# Patient Record
Sex: MALE | Race: WHITE | Employment: OTHER | URBAN - METROPOLITAN AREA
[De-identification: names, ages, dates, MRNs, and addresses within clinical notes are randomized per-mention and may not be internally consistent; named-entity substitution may affect disease eponyms.]

---

## 2017-12-27 PROBLEM — I48.91 A-FIB (HCC): Status: ACTIVE | Noted: 2017-12-27

## 2017-12-27 PROBLEM — I50.9 CHF (CONGESTIVE HEART FAILURE) (HCC): Status: ACTIVE | Noted: 2017-12-27

## 2018-10-04 ENCOUNTER — APPOINTMENT (OUTPATIENT)
Dept: GENERAL RADIOLOGY | Age: 73
DRG: 313 | End: 2018-10-04
Payer: MEDICARE

## 2018-10-04 ENCOUNTER — HOSPITAL ENCOUNTER (INPATIENT)
Age: 73
LOS: 1 days | Discharge: HOME | DRG: 313 | End: 2018-10-05
Attending: EMERGENCY MEDICINE | Admitting: INTERNAL MEDICINE
Payer: MEDICARE

## 2018-10-04 DIAGNOSIS — R07.9 CHEST PAIN, UNSPECIFIED TYPE: Primary | ICD-10-CM

## 2018-10-04 LAB
ANION GAP SERPL CALCULATED.3IONS-SCNC: 13 MMOL/L (ref 7–16)
BASOPHILS ABSOLUTE: 0 E9/L (ref 0–0.2)
BASOPHILS RELATIVE PERCENT: 0.5 % (ref 0–2)
BUN BLDV-MCNC: 18 MG/DL (ref 8–23)
CALCIUM SERPL-MCNC: 9.1 MG/DL (ref 8.6–10.2)
CHLORIDE BLD-SCNC: 99 MMOL/L (ref 98–107)
CO2: 25 MMOL/L (ref 22–29)
CREAT SERPL-MCNC: 1.5 MG/DL (ref 0.7–1.2)
EOSINOPHILS ABSOLUTE: 0.13 E9/L (ref 0.05–0.5)
EOSINOPHILS RELATIVE PERCENT: 1.7 % (ref 0–6)
GFR AFRICAN AMERICAN: 55
GFR NON-AFRICAN AMERICAN: 46 ML/MIN/1.73
GLUCOSE BLD-MCNC: 186 MG/DL (ref 74–109)
HCT VFR BLD CALC: 41.5 % (ref 37–54)
HEMOGLOBIN: 14.4 G/DL (ref 12.5–16.5)
LYMPHOCYTES ABSOLUTE: 2.69 E9/L (ref 1.5–4)
LYMPHOCYTES RELATIVE PERCENT: 33.9 % (ref 20–42)
MCH RBC QN AUTO: 32.8 PG (ref 26–35)
MCHC RBC AUTO-ENTMCNC: 34.7 % (ref 32–34.5)
MCV RBC AUTO: 94.5 FL (ref 80–99.9)
METAMYELOCYTES RELATIVE PERCENT: 1.7 % (ref 0–1)
MONOCYTES ABSOLUTE: 0.71 E9/L (ref 0.1–0.95)
MONOCYTES RELATIVE PERCENT: 8.7 % (ref 2–12)
NEUTROPHILS ABSOLUTE: 4.42 E9/L (ref 1.8–7.3)
NEUTROPHILS RELATIVE PERCENT: 53.9 % (ref 43–80)
OVALOCYTES: ABNORMAL
PDW BLD-RTO: 13.1 FL (ref 11.5–15)
PLATELET # BLD: 77 E9/L (ref 130–450)
PLATELET CONFIRMATION: NORMAL
PMV BLD AUTO: 9.6 FL (ref 7–12)
POIKILOCYTES: ABNORMAL
POLYCHROMASIA: ABNORMAL
POTASSIUM SERPL-SCNC: 4.5 MMOL/L (ref 3.5–5)
RBC # BLD: 4.39 E12/L (ref 3.8–5.8)
SODIUM BLD-SCNC: 137 MMOL/L (ref 132–146)
TEAR DROP CELLS: ABNORMAL
TROPONIN: <0.01 NG/ML (ref 0–0.03)
TROPONIN: <0.01 NG/ML (ref 0–0.03)
WBC # BLD: 7.9 E9/L (ref 4.5–11.5)

## 2018-10-04 PROCEDURE — 84484 ASSAY OF TROPONIN QUANT: CPT

## 2018-10-04 PROCEDURE — 1200000000 HC SEMI PRIVATE

## 2018-10-04 PROCEDURE — 80048 BASIC METABOLIC PNL TOTAL CA: CPT

## 2018-10-04 PROCEDURE — 71045 X-RAY EXAM CHEST 1 VIEW: CPT

## 2018-10-04 PROCEDURE — 85025 COMPLETE CBC W/AUTO DIFF WBC: CPT

## 2018-10-04 PROCEDURE — G0378 HOSPITAL OBSERVATION PER HR: HCPCS

## 2018-10-04 PROCEDURE — 99285 EMERGENCY DEPT VISIT HI MDM: CPT

## 2018-10-04 PROCEDURE — 6370000000 HC RX 637 (ALT 250 FOR IP): Performed by: EMERGENCY MEDICINE

## 2018-10-04 PROCEDURE — 36415 COLL VENOUS BLD VENIPUNCTURE: CPT

## 2018-10-04 RX ORDER — ASPIRIN 81 MG/1
324 TABLET, CHEWABLE ORAL ONCE
Status: COMPLETED | OUTPATIENT
Start: 2018-10-04 | End: 2018-10-04

## 2018-10-04 RX ORDER — ASPIRIN 81 MG/1
81 TABLET, CHEWABLE ORAL DAILY
Status: CANCELLED | OUTPATIENT
Start: 2018-10-05

## 2018-10-04 RX ADMIN — ASPIRIN 81 MG CHEWABLE TABLET 324 MG: 81 TABLET CHEWABLE at 17:28

## 2018-10-04 ASSESSMENT — ENCOUNTER SYMPTOMS
WHEEZING: 0
SINUS PRESSURE: 0
DIARRHEA: 0
SINUS PAIN: 0
NAUSEA: 0
COUGH: 0
SORE THROAT: 0
PHOTOPHOBIA: 0
ABDOMINAL PAIN: 0
VOMITING: 0
CONSTIPATION: 0
BACK PAIN: 0
SHORTNESS OF BREATH: 1
RHINORRHEA: 0

## 2018-10-04 ASSESSMENT — PAIN DESCRIPTION - LOCATION: LOCATION: CHEST

## 2018-10-04 ASSESSMENT — PAIN SCALES - GENERAL: PAINLEVEL_OUTOF10: 5

## 2018-10-04 ASSESSMENT — PAIN DESCRIPTION - PAIN TYPE: TYPE: ACUTE PAIN

## 2018-10-04 NOTE — ED NOTES
Ambulated pt around the nurses station. His pulse ox remained between 96-97%. His pulse was 97 while sitting, dropped to 54 while standing. Pt c/o of feeling dizzy and stood for a moment before walking. His pulse was 74 and he no longer felt dizzy and we walked around the nurses station. His pulse while ambulating was as low as 54 and as high as 116. Pulse ox remained the same. Pt did not complain of SOB or chest pain while ambulating.       Melissa Linton RN  10/04/18 [id number]

## 2018-10-05 ENCOUNTER — APPOINTMENT (OUTPATIENT)
Dept: NUCLEAR MEDICINE | Age: 73
DRG: 313 | End: 2018-10-05
Payer: MEDICARE

## 2018-10-05 VITALS
OXYGEN SATURATION: 99 % | SYSTOLIC BLOOD PRESSURE: 154 MMHG | HEART RATE: 75 BPM | BODY MASS INDEX: 35.35 KG/M2 | WEIGHT: 261 LBS | HEIGHT: 72 IN | TEMPERATURE: 98.3 F | RESPIRATION RATE: 18 BRPM | DIASTOLIC BLOOD PRESSURE: 91 MMHG

## 2018-10-05 LAB
ALBUMIN SERPL-MCNC: 3.9 G/DL (ref 3.5–5.2)
ALP BLD-CCNC: 41 U/L (ref 40–129)
ALT SERPL-CCNC: 24 U/L (ref 0–40)
ANION GAP SERPL CALCULATED.3IONS-SCNC: 13 MMOL/L (ref 7–16)
AST SERPL-CCNC: 35 U/L (ref 0–39)
BILIRUB SERPL-MCNC: 0.6 MG/DL (ref 0–1.2)
BUN BLDV-MCNC: 20 MG/DL (ref 8–23)
CALCIUM SERPL-MCNC: 8.8 MG/DL (ref 8.6–10.2)
CHLORIDE BLD-SCNC: 100 MMOL/L (ref 98–107)
CHOLESTEROL, TOTAL: 114 MG/DL (ref 0–199)
CO2: 25 MMOL/L (ref 22–29)
CREAT SERPL-MCNC: 1.4 MG/DL (ref 0.7–1.2)
GFR AFRICAN AMERICAN: 60
GFR NON-AFRICAN AMERICAN: 49 ML/MIN/1.73
GLUCOSE BLD-MCNC: 195 MG/DL (ref 74–109)
HBA1C MFR BLD: 7.1 % (ref 4–5.6)
HCT VFR BLD CALC: 40.2 % (ref 37–54)
HDLC SERPL-MCNC: 29 MG/DL
HEMOGLOBIN: 14.1 G/DL (ref 12.5–16.5)
LDL CHOLESTEROL CALCULATED: 51 MG/DL (ref 0–99)
LV EF: 58 %
LVEF MODALITY: NORMAL
MAGNESIUM: 1.7 MG/DL (ref 1.6–2.6)
MCH RBC QN AUTO: 33.2 PG (ref 26–35)
MCHC RBC AUTO-ENTMCNC: 35.1 % (ref 32–34.5)
MCV RBC AUTO: 94.6 FL (ref 80–99.9)
METER GLUCOSE: 178 MG/DL (ref 70–110)
METER GLUCOSE: 189 MG/DL (ref 70–110)
METER GLUCOSE: 266 MG/DL (ref 70–110)
PDW BLD-RTO: 13.2 FL (ref 11.5–15)
PHOSPHORUS: 3 MG/DL (ref 2.5–4.5)
PLATELET # BLD: 71 E9/L (ref 130–450)
PLATELET CONFIRMATION: NORMAL
PMV BLD AUTO: 9.9 FL (ref 7–12)
POTASSIUM SERPL-SCNC: 5.6 MMOL/L (ref 3.5–5)
RBC # BLD: 4.25 E12/L (ref 3.8–5.8)
SODIUM BLD-SCNC: 138 MMOL/L (ref 132–146)
TOTAL PROTEIN: 6.7 G/DL (ref 6.4–8.3)
TRIGL SERPL-MCNC: 169 MG/DL (ref 0–149)
TROPONIN: <0.01 NG/ML (ref 0–0.03)
TROPONIN: <0.01 NG/ML (ref 0–0.03)
TSH SERPL DL<=0.05 MIU/L-ACNC: 4.15 UIU/ML (ref 0.27–4.2)
VLDLC SERPL CALC-MCNC: 34 MG/DL
WBC # BLD: 7.2 E9/L (ref 4.5–11.5)

## 2018-10-05 PROCEDURE — 83735 ASSAY OF MAGNESIUM: CPT

## 2018-10-05 PROCEDURE — 3430000000 HC RX DIAGNOSTIC RADIOPHARMACEUTICAL: Performed by: RADIOLOGY

## 2018-10-05 PROCEDURE — 94660 CPAP INITIATION&MGMT: CPT

## 2018-10-05 PROCEDURE — 84443 ASSAY THYROID STIM HORMONE: CPT

## 2018-10-05 PROCEDURE — 90686 IIV4 VACC NO PRSV 0.5 ML IM: CPT | Performed by: INTERNAL MEDICINE

## 2018-10-05 PROCEDURE — 93017 CV STRESS TEST TRACING ONLY: CPT

## 2018-10-05 PROCEDURE — G0378 HOSPITAL OBSERVATION PER HR: HCPCS

## 2018-10-05 PROCEDURE — 84100 ASSAY OF PHOSPHORUS: CPT

## 2018-10-05 PROCEDURE — 36415 COLL VENOUS BLD VENIPUNCTURE: CPT

## 2018-10-05 PROCEDURE — 78452 HT MUSCLE IMAGE SPECT MULT: CPT

## 2018-10-05 PROCEDURE — 6370000000 HC RX 637 (ALT 250 FOR IP): Performed by: INTERNAL MEDICINE

## 2018-10-05 PROCEDURE — 6360000002 HC RX W HCPCS: Performed by: INTERNAL MEDICINE

## 2018-10-05 PROCEDURE — 83036 HEMOGLOBIN GLYCOSYLATED A1C: CPT

## 2018-10-05 PROCEDURE — 80053 COMPREHEN METABOLIC PANEL: CPT

## 2018-10-05 PROCEDURE — G0008 ADMIN INFLUENZA VIRUS VAC: HCPCS | Performed by: INTERNAL MEDICINE

## 2018-10-05 PROCEDURE — 84484 ASSAY OF TROPONIN QUANT: CPT

## 2018-10-05 PROCEDURE — 80061 LIPID PANEL: CPT

## 2018-10-05 PROCEDURE — 82962 GLUCOSE BLOOD TEST: CPT

## 2018-10-05 PROCEDURE — 85027 COMPLETE CBC AUTOMATED: CPT

## 2018-10-05 PROCEDURE — 1200000000 HC SEMI PRIVATE

## 2018-10-05 PROCEDURE — A9500 TC99M SESTAMIBI: HCPCS | Performed by: RADIOLOGY

## 2018-10-05 PROCEDURE — 93005 ELECTROCARDIOGRAM TRACING: CPT | Performed by: INTERNAL MEDICINE

## 2018-10-05 RX ORDER — NITROGLYCERIN 0.4 MG/1
0.4 TABLET SUBLINGUAL EVERY 5 MIN PRN
Status: DISCONTINUED | OUTPATIENT
Start: 2018-10-05 | End: 2018-10-05 | Stop reason: SDUPTHER

## 2018-10-05 RX ORDER — ACETAMINOPHEN 325 MG/1
650 TABLET ORAL EVERY 6 HOURS PRN
Status: DISCONTINUED | OUTPATIENT
Start: 2018-10-05 | End: 2018-10-05 | Stop reason: HOSPADM

## 2018-10-05 RX ORDER — PANTOPRAZOLE SODIUM 20 MG/1
20 TABLET, DELAYED RELEASE ORAL DAILY
COMMUNITY
End: 2018-10-05

## 2018-10-05 RX ORDER — DEXTROSE MONOHYDRATE 25 G/50ML
12.5 INJECTION, SOLUTION INTRAVENOUS PRN
Status: DISCONTINUED | OUTPATIENT
Start: 2018-10-05 | End: 2018-10-05 | Stop reason: HOSPADM

## 2018-10-05 RX ORDER — PRAVASTATIN SODIUM 20 MG
20 TABLET ORAL NIGHTLY
Status: DISCONTINUED | OUTPATIENT
Start: 2018-10-05 | End: 2018-10-05 | Stop reason: HOSPADM

## 2018-10-05 RX ORDER — LISINOPRIL 10 MG/1
10 TABLET ORAL DAILY
Status: DISCONTINUED | OUTPATIENT
Start: 2018-10-06 | End: 2018-10-05

## 2018-10-05 RX ORDER — POLYETHYLENE GLYCOL 3350 17 G/17G
17 POWDER, FOR SOLUTION ORAL DAILY
Status: DISCONTINUED | OUTPATIENT
Start: 2018-10-05 | End: 2018-10-05 | Stop reason: HOSPADM

## 2018-10-05 RX ORDER — LIDOCAINE 40 MG/G
1 CREAM TOPICAL 2 TIMES DAILY PRN
COMMUNITY
End: 2019-05-07 | Stop reason: ALTCHOICE

## 2018-10-05 RX ORDER — SODIUM CHLORIDE 0.9 % (FLUSH) 0.9 %
10 SYRINGE (ML) INJECTION PRN
Status: DISCONTINUED | OUTPATIENT
Start: 2018-10-05 | End: 2018-10-05 | Stop reason: HOSPADM

## 2018-10-05 RX ORDER — AMIODARONE HYDROCHLORIDE 200 MG/1
200 TABLET ORAL DAILY
COMMUNITY

## 2018-10-05 RX ORDER — ACETAMINOPHEN 325 MG/1
650 TABLET ORAL EVERY 6 HOURS PRN
COMMUNITY
End: 2018-11-30 | Stop reason: ALTCHOICE

## 2018-10-05 RX ORDER — INSULIN GLARGINE 100 [IU]/ML
36 INJECTION, SOLUTION SUBCUTANEOUS NIGHTLY
Status: DISCONTINUED | OUTPATIENT
Start: 2018-10-05 | End: 2018-10-05 | Stop reason: HOSPADM

## 2018-10-05 RX ORDER — PANTOPRAZOLE SODIUM 20 MG/1
40 TABLET, DELAYED RELEASE ORAL DAILY
Qty: 30 TABLET | Refills: 0 | Status: SHIPPED | OUTPATIENT
Start: 2018-10-05

## 2018-10-05 RX ORDER — MULTIVIT-MIN/IRON/FOLIC ACID/K 18-600-40
2000 CAPSULE ORAL DAILY
COMMUNITY

## 2018-10-05 RX ORDER — LISINOPRIL 20 MG/1
20 TABLET ORAL DAILY
COMMUNITY

## 2018-10-05 RX ORDER — PANTOPRAZOLE SODIUM 20 MG/1
20 TABLET, DELAYED RELEASE ORAL DAILY
Status: ON HOLD | COMMUNITY
End: 2018-10-05 | Stop reason: HOSPADM

## 2018-10-05 RX ORDER — DEXTROSE MONOHYDRATE 50 MG/ML
100 INJECTION, SOLUTION INTRAVENOUS PRN
Status: DISCONTINUED | OUTPATIENT
Start: 2018-10-05 | End: 2018-10-05 | Stop reason: HOSPADM

## 2018-10-05 RX ORDER — LIDOCAINE 40 MG/G
1 CREAM TOPICAL 2 TIMES DAILY PRN
Status: DISCONTINUED | OUTPATIENT
Start: 2018-10-05 | End: 2018-10-05 | Stop reason: HOSPADM

## 2018-10-05 RX ORDER — LISINOPRIL 20 MG/1
20 TABLET ORAL DAILY
Status: DISCONTINUED | OUTPATIENT
Start: 2018-10-05 | End: 2018-10-05 | Stop reason: HOSPADM

## 2018-10-05 RX ORDER — LISINOPRIL 10 MG/1
10 TABLET ORAL 2 TIMES DAILY
Status: DISCONTINUED | OUTPATIENT
Start: 2018-10-05 | End: 2018-10-05

## 2018-10-05 RX ORDER — PANTOPRAZOLE SODIUM 20 MG/1
20 TABLET, DELAYED RELEASE ORAL DAILY
Status: DISCONTINUED | OUTPATIENT
Start: 2018-10-05 | End: 2018-10-05 | Stop reason: HOSPADM

## 2018-10-05 RX ORDER — PANTOPRAZOLE SODIUM 40 MG/1
40 TABLET, DELAYED RELEASE ORAL
Status: DISCONTINUED | OUTPATIENT
Start: 2018-10-05 | End: 2018-10-05

## 2018-10-05 RX ORDER — METOPROLOL SUCCINATE 50 MG/1
25 TABLET, EXTENDED RELEASE ORAL NIGHTLY
COMMUNITY

## 2018-10-05 RX ORDER — M-VIT,TX,IRON,MINS/CALC/FOLIC 27MG-0.4MG
1 TABLET ORAL DAILY
Status: DISCONTINUED | OUTPATIENT
Start: 2018-10-05 | End: 2018-10-05 | Stop reason: HOSPADM

## 2018-10-05 RX ORDER — ASPIRIN 81 MG/1
81 TABLET ORAL DAILY
Status: DISCONTINUED | OUTPATIENT
Start: 2018-10-05 | End: 2018-10-05 | Stop reason: HOSPADM

## 2018-10-05 RX ORDER — FLUTICASONE PROPIONATE 50 MCG
2 SPRAY, SUSPENSION (ML) NASAL DAILY
Status: DISCONTINUED | OUTPATIENT
Start: 2018-10-05 | End: 2018-10-05 | Stop reason: HOSPADM

## 2018-10-05 RX ORDER — POLYETHYLENE GLYCOL 3350 17 G/17G
17 POWDER, FOR SOLUTION ORAL DAILY
COMMUNITY

## 2018-10-05 RX ORDER — CETIRIZINE HYDROCHLORIDE 10 MG/1
10 TABLET ORAL DAILY
COMMUNITY

## 2018-10-05 RX ORDER — AMIODARONE HYDROCHLORIDE 200 MG/1
200 TABLET ORAL DAILY
Status: DISCONTINUED | OUTPATIENT
Start: 2018-10-05 | End: 2018-10-05 | Stop reason: HOSPADM

## 2018-10-05 RX ORDER — GLIPIZIDE 5 MG/1
10 TABLET ORAL
Status: DISCONTINUED | OUTPATIENT
Start: 2018-10-05 | End: 2018-10-05 | Stop reason: HOSPADM

## 2018-10-05 RX ORDER — FUROSEMIDE 20 MG/1
20 TABLET ORAL DAILY
Status: DISCONTINUED | OUTPATIENT
Start: 2018-10-05 | End: 2018-10-05 | Stop reason: HOSPADM

## 2018-10-05 RX ORDER — NITROGLYCERIN 0.4 MG/1
0.4 TABLET SUBLINGUAL EVERY 5 MIN PRN
Status: DISCONTINUED | OUTPATIENT
Start: 2018-10-05 | End: 2018-10-05 | Stop reason: HOSPADM

## 2018-10-05 RX ORDER — ASPIRIN 81 MG/1
81 TABLET, CHEWABLE ORAL DAILY
Status: DISCONTINUED | OUTPATIENT
Start: 2018-10-05 | End: 2018-10-05

## 2018-10-05 RX ORDER — METOPROLOL SUCCINATE 25 MG/1
50 TABLET, EXTENDED RELEASE ORAL 2 TIMES DAILY
Status: DISCONTINUED | OUTPATIENT
Start: 2018-10-05 | End: 2018-10-05

## 2018-10-05 RX ORDER — METOPROLOL SUCCINATE 25 MG/1
25 TABLET, EXTENDED RELEASE ORAL NIGHTLY
Status: DISCONTINUED | OUTPATIENT
Start: 2018-10-05 | End: 2018-10-05 | Stop reason: HOSPADM

## 2018-10-05 RX ORDER — GLIPIZIDE 10 MG/1
10 TABLET ORAL
Status: ON HOLD | COMMUNITY
End: 2018-10-12

## 2018-10-05 RX ORDER — CETIRIZINE HYDROCHLORIDE 10 MG/1
10 TABLET ORAL DAILY
Status: DISCONTINUED | OUTPATIENT
Start: 2018-10-05 | End: 2018-10-05 | Stop reason: HOSPADM

## 2018-10-05 RX ORDER — METOPROLOL SUCCINATE 25 MG/1
50 TABLET, EXTENDED RELEASE ORAL DAILY
Status: DISCONTINUED | OUTPATIENT
Start: 2018-10-06 | End: 2018-10-05

## 2018-10-05 RX ORDER — ASPIRIN 81 MG/1
81 TABLET ORAL NIGHTLY
COMMUNITY

## 2018-10-05 RX ORDER — PRAVASTATIN SODIUM 20 MG
40 TABLET ORAL DAILY
Status: DISCONTINUED | OUTPATIENT
Start: 2018-10-05 | End: 2018-10-05

## 2018-10-05 RX ORDER — TAMSULOSIN HYDROCHLORIDE 0.4 MG/1
0.4 CAPSULE ORAL NIGHTLY
Status: DISCONTINUED | OUTPATIENT
Start: 2018-10-05 | End: 2018-10-05 | Stop reason: HOSPADM

## 2018-10-05 RX ORDER — INSULIN GLARGINE 100 [IU]/ML
30 INJECTION, SOLUTION SUBCUTANEOUS NIGHTLY
Status: DISCONTINUED | OUTPATIENT
Start: 2018-10-05 | End: 2018-10-05

## 2018-10-05 RX ORDER — NICOTINE POLACRILEX 4 MG
15 LOZENGE BUCCAL PRN
Status: DISCONTINUED | OUTPATIENT
Start: 2018-10-05 | End: 2018-10-05 | Stop reason: HOSPADM

## 2018-10-05 RX ORDER — FLUTICASONE PROPIONATE 50 MCG
1 SPRAY, SUSPENSION (ML) NASAL DAILY
Status: DISCONTINUED | OUTPATIENT
Start: 2018-10-05 | End: 2018-10-05

## 2018-10-05 RX ORDER — SODIUM CHLORIDE 0.9 % (FLUSH) 0.9 %
10 SYRINGE (ML) INJECTION EVERY 12 HOURS SCHEDULED
Status: DISCONTINUED | OUTPATIENT
Start: 2018-10-05 | End: 2018-10-05 | Stop reason: HOSPADM

## 2018-10-05 RX ORDER — TAMSULOSIN HYDROCHLORIDE 0.4 MG/1
0.4 CAPSULE ORAL NIGHTLY
COMMUNITY

## 2018-10-05 RX ORDER — BUDESONIDE AND FORMOTEROL FUMARATE DIHYDRATE 160; 4.5 UG/1; UG/1
2 AEROSOL RESPIRATORY (INHALATION) 2 TIMES DAILY
COMMUNITY

## 2018-10-05 RX ADMIN — AMIODARONE HYDROCHLORIDE 200 MG: 200 TABLET ORAL at 14:17

## 2018-10-05 RX ADMIN — Medication 10 MILLICURIE: at 08:40

## 2018-10-05 RX ADMIN — PANTOPRAZOLE SODIUM 20 MG: 20 TABLET, DELAYED RELEASE ORAL at 14:16

## 2018-10-05 RX ADMIN — Medication 30 MILLICURIE: at 11:31

## 2018-10-05 RX ADMIN — ASPIRIN 81 MG: 81 TABLET, COATED ORAL at 14:16

## 2018-10-05 RX ADMIN — LISINOPRIL 20 MG: 20 TABLET ORAL at 14:17

## 2018-10-05 RX ADMIN — INSULIN LISPRO 6 UNITS: 100 INJECTION, SOLUTION INTRAVENOUS; SUBCUTANEOUS at 14:21

## 2018-10-05 RX ADMIN — FUROSEMIDE 20 MG: 20 TABLET ORAL at 14:16

## 2018-10-05 RX ADMIN — CETIRIZINE HYDROCHLORIDE 10 MG: 10 TABLET, FILM COATED ORAL at 14:17

## 2018-10-05 RX ADMIN — REGADENOSON 0.4 MG: 0.08 INJECTION, SOLUTION INTRAVENOUS at 11:17

## 2018-10-05 RX ADMIN — INFLUENZA A VIRUS A/MICHIGAN/45/2015 X-275 (H1N1) ANTIGEN (FORMALDEHYDE INACTIVATED), INFLUENZA A VIRUS A/SINGAPORE/INFIMH-16-0019/2016 IVR-186 (H3N2) ANTIGEN (FORMALDEHYDE INACTIVATED), INFLUENZA B VIRUS B/PHUKET/3073/2013 ANTIGEN (FORMALDEHYDE INACTIVATED), AND INFLUENZA B VIRUS B/MARYLAND/15/2016 BX-69A ANTIGEN (FORMALDEHYDE INACTIVATED) 0.5 ML: 15; 15; 15; 15 INJECTION, SUSPENSION INTRAMUSCULAR at 14:56

## 2018-10-05 RX ADMIN — VITAMIN D, TAB 1000IU (100/BT) 2000 UNITS: 25 TAB at 14:16

## 2018-10-05 RX ADMIN — POLYETHYLENE GLYCOL (3350) 17 G: 17 POWDER, FOR SOLUTION ORAL at 14:18

## 2018-10-05 RX ADMIN — MULTIPLE VITAMINS W/ MINERALS TAB 1 TABLET: TAB at 14:17

## 2018-10-05 RX ADMIN — PANTOPRAZOLE SODIUM 40 MG: 40 TABLET, DELAYED RELEASE ORAL at 07:25

## 2018-10-05 ASSESSMENT — PAIN SCALES - GENERAL
PAINLEVEL_OUTOF10: 0
PAINLEVEL_OUTOF10: 5

## 2018-10-05 ASSESSMENT — PAIN DESCRIPTION - DESCRIPTORS: DESCRIPTORS: ACHING;CONSTANT;DISCOMFORT

## 2018-10-05 ASSESSMENT — PAIN DESCRIPTION - LOCATION: LOCATION: SHOULDER

## 2018-10-05 ASSESSMENT — PAIN DESCRIPTION - FREQUENCY: FREQUENCY: CONTINUOUS

## 2018-10-05 ASSESSMENT — PAIN DESCRIPTION - ORIENTATION: ORIENTATION: RIGHT;LEFT

## 2018-10-05 ASSESSMENT — PAIN DESCRIPTION - PAIN TYPE: TYPE: ACUTE PAIN

## 2018-10-05 NOTE — PROGRESS NOTES
Pt stated Pharmacist called the [facility] to go over medications. [phone] Asked Dr June Zavaleta to look over meds (over the phone) new medications found when went over with [facility] per technician.

## 2018-10-05 NOTE — ED NOTES
Pt.  To be ED hold. Remains in hospital bed. Resting comfortably.      Gerber Melendez RN  10/05/18 9738

## 2018-10-05 NOTE — ED NOTES
Pt. Aware he is an ED hold. CPAP on.  Pt. Tolerating well. Positioning self in bed. Blood drawn as ordered.      Angel Finley RN  10/05/18 0447

## 2018-10-05 NOTE — ED NOTES
Awake.  CPAP off per pt. Request.  oob and used BR. Tolerated well. Pt.  Aware he is NPO for a stress test this am.     Yolanda Mancia RN  10/05/18 9295

## 2018-10-05 NOTE — H&P
Name: Victoria Rosario  :   MRN: [id number]  Room: GILDARDO/GILDARDO  DOS: 10/5/2018  PCP: Joanna Bermudez MD  Admitting Physician: No admitting provider for patient encounter. Internal Medicine Resident History & Physical    Chief Complaint:  Chest pain    History of Present Illness:    Patient presented to the ED due to chest pain which is chronic and intermittent. Chest pain had increased in intensity/frequency and so he decided to come to ED for further evaluation. States pain occurs at rest or with exertion and last a few minutes each time. He has associated generalized weakness, wheezing, and shortness of breath. States he has history of atrial fibrillation and was cardioverted during this past summer. He is on xarelto for anti-coagulation. He follows with cardiologist at the [facility]. Last visit was about a month ago. 2017 echocardiogram    Left ventricle is normal in size .   Mild left ventricular concentric hypertrophy noted.   No regional wall motion abnormalities seen.   Normal left ventricular ejection fraction.   Physiologic and/or trace tricuspid regurgitation.   Ejection fraction is visually estimated at 55%. Labs:  Lab Results   Component Value Date    WBC 7.2 10/05/2018    HGB 14.1 10/05/2018    HCT 40.2 10/05/2018    PLT 71 (L) 10/05/2018     10/05/2018    K 5.6 (H) 10/05/2018     10/05/2018    CREATININE 1.4 (H) 10/05/2018    BUN 20 10/05/2018    CO2 25 10/05/2018    GLUCOSE 195 (H) 10/05/2018    ALT 24 10/05/2018    AST 35 10/05/2018    INR 1.7 2017     Lab Results   Component Value Date    CKTOTAL 33 12/10/2015    CKMB 1.2 12/10/2015    TROPONINI <0.01 10/05/2018     No results for input(s): BNP in the last 72 hours. Radiology:  Xr Chest 1 Vw    Result Date: 10/4/2018  Reading location: 100 Indication: Chest pain, shortness of breath. Comparison: Chest radiograph from 2017. Technique: Portable AP upright chest radiograph was obtained. Findings:  The fluticasone (FLONASE) 50 MCG/ACT nasal spray 2 sprays by Nasal route daily  12/30/17  Yes Romero Masters MD   furosemide (LASIX) 20 MG tablet Take 1 tablet by mouth daily 12/30/17  Yes RICK Ricardo CNP   insulin glargine (LANTUS) 100 UNIT/ML injection vial Inject 30 Units into the skin nightly  Patient taking differently: Inject 36 Units into the skin nightly  12/12/15  Yes Romero Masters MD   metformin (GLUCOPHAGE) 1000 MG tablet Take 1,000 mg by mouth 2 times daily (with meals). Yes Historical Provider, MD   nitroGLYCERIN (NITROSTAT) 0.4 MG SL tablet Place 0.4 mg under the tongue every 5 minutes as needed. Yes Historical Provider, MD   pravastatin (PRAVACHOL) 40 MG tablet Take 20 mg by mouth nightly    Yes Historical Provider, MD   therapeutic multivitamin-minerals (THERAGRAN-M) tablet Take 1 tablet by mouth daily Last dose 12/04/2015   Yes Historical Provider, MD       Allergies:  Dye [iodides]    Social Hx:  Social History     Social History    Marital status:      Spouse name: N/A    Number of children: N/A    Years of education: N/A     Occupational History    Not on file.      Social History Main Topics    Smoking status: Former Smoker     Packs/day: 0.50     Years: 25.00     Types: Cigarettes     Quit date: 1/1/1995    Smokeless tobacco: Never Used    Alcohol use No      Comment: heavy drinker in the past--none since 2000;  drinks 3-4 cups of coffee dialy    Drug use: No    Sexual activity: Not on file     Other Topics Concern    Not on file     Social History Narrative    No narrative on file       Vitals:  /89   Pulse 72   Temp 97.9 °F (36.6 °C) (Oral)   Resp 19   Ht 6' (1.829 m)   Wt 261 lb (118.4 kg)   SpO2 97%   BMI 35.40 kg/m²     Physical Exam:  General: Awake, alert, oriented to person, place, time, and purpose, appears stated age, cooperative, no acute distress   Head: Normocephalic, atraumatic  Eyes: Conjunctivae/corneas clear, Sclera non

## 2018-10-06 LAB
EKG ATRIAL RATE: 68 BPM
EKG P AXIS: 54 DEGREES
EKG P-R INTERVAL: 214 MS
EKG Q-T INTERVAL: 432 MS
EKG QRS DURATION: 88 MS
EKG QTC CALCULATION (BAZETT): 459 MS
EKG R AXIS: -18 DEGREES
EKG T AXIS: 51 DEGREES
EKG VENTRICULAR RATE: 68 BPM

## 2018-10-09 ENCOUNTER — HOSPITAL ENCOUNTER (INPATIENT)
Age: 73
LOS: 3 days | Discharge: OTHER INSTITUTION | DRG: 311 | End: 2018-10-12
Attending: EMERGENCY MEDICINE | Admitting: INTERNAL MEDICINE
Payer: MEDICARE

## 2018-10-09 ENCOUNTER — APPOINTMENT (OUTPATIENT)
Dept: GENERAL RADIOLOGY | Age: 73
DRG: 311 | End: 2018-10-09
Payer: MEDICARE

## 2018-10-09 DIAGNOSIS — I24.9 ACUTE CORONARY SYNDROME (HCC): Primary | ICD-10-CM

## 2018-10-09 LAB
ANION GAP SERPL CALCULATED.3IONS-SCNC: 16 MMOL/L (ref 7–16)
BASOPHILS ABSOLUTE: 0.09 E9/L (ref 0–0.2)
BASOPHILS RELATIVE PERCENT: 1 % (ref 0–2)
BUN BLDV-MCNC: 29 MG/DL (ref 8–23)
CALCIUM SERPL-MCNC: 9.1 MG/DL (ref 8.6–10.2)
CHLORIDE BLD-SCNC: 102 MMOL/L (ref 98–107)
CO2: 23 MMOL/L (ref 22–29)
CREAT SERPL-MCNC: 1.7 MG/DL (ref 0.7–1.2)
EKG ATRIAL RATE: 77 BPM
EKG P AXIS: 46 DEGREES
EKG P-R INTERVAL: 236 MS
EKG Q-T INTERVAL: 398 MS
EKG QRS DURATION: 90 MS
EKG QTC CALCULATION (BAZETT): 450 MS
EKG R AXIS: -22 DEGREES
EKG T AXIS: 38 DEGREES
EKG VENTRICULAR RATE: 77 BPM
EOSINOPHILS ABSOLUTE: 0 E9/L (ref 0.05–0.5)
EOSINOPHILS RELATIVE PERCENT: 0 % (ref 0–6)
GFR AFRICAN AMERICAN: 48
GFR NON-AFRICAN AMERICAN: 39 ML/MIN/1.73
GLUCOSE BLD-MCNC: 67 MG/DL (ref 74–109)
GLUCOSE BLD-MCNC: 75 MG/DL
HCT VFR BLD CALC: 42 % (ref 37–54)
HEMOGLOBIN: 14.7 G/DL (ref 12.5–16.5)
LYMPHOCYTES ABSOLUTE: 3.69 E9/L (ref 1.5–4)
LYMPHOCYTES RELATIVE PERCENT: 41 % (ref 20–42)
MCH RBC QN AUTO: 33.4 PG (ref 26–35)
MCHC RBC AUTO-ENTMCNC: 35 % (ref 32–34.5)
MCV RBC AUTO: 95.5 FL (ref 80–99.9)
METER GLUCOSE: 175 MG/DL (ref 70–110)
METER GLUCOSE: 248 MG/DL (ref 70–110)
METER GLUCOSE: 75 MG/DL (ref 70–110)
MONOCYTES ABSOLUTE: 0.9 E9/L (ref 0.1–0.95)
MONOCYTES RELATIVE PERCENT: 10 % (ref 2–12)
NEUTROPHILS ABSOLUTE: 4.32 E9/L (ref 1.8–7.3)
NEUTROPHILS RELATIVE PERCENT: 48 % (ref 43–80)
PDW BLD-RTO: 13.3 FL (ref 11.5–15)
PLATELET # BLD: 86 E9/L (ref 130–450)
PLATELET CONFIRMATION: NORMAL
PMV BLD AUTO: 9.9 FL (ref 7–12)
POTASSIUM SERPL-SCNC: 4.7 MMOL/L (ref 3.5–5)
PRO-BNP: 46 PG/ML (ref 0–450)
RBC # BLD: 4.4 E12/L (ref 3.8–5.8)
RBC # BLD: NORMAL 10*6/UL
SODIUM BLD-SCNC: 141 MMOL/L (ref 132–146)
TROPONIN: <0.01 NG/ML (ref 0–0.03)
TROPONIN: <0.01 NG/ML (ref 0–0.03)
WBC # BLD: 9 E9/L (ref 4.5–11.5)

## 2018-10-09 PROCEDURE — G0378 HOSPITAL OBSERVATION PER HR: HCPCS

## 2018-10-09 PROCEDURE — 36415 COLL VENOUS BLD VENIPUNCTURE: CPT

## 2018-10-09 PROCEDURE — 1200000000 HC SEMI PRIVATE

## 2018-10-09 PROCEDURE — 80048 BASIC METABOLIC PNL TOTAL CA: CPT

## 2018-10-09 PROCEDURE — 93005 ELECTROCARDIOGRAM TRACING: CPT | Performed by: EMERGENCY MEDICINE

## 2018-10-09 PROCEDURE — 96372 THER/PROPH/DIAG INJ SC/IM: CPT

## 2018-10-09 PROCEDURE — 2580000003 HC RX 258: Performed by: EMERGENCY MEDICINE

## 2018-10-09 PROCEDURE — 83036 HEMOGLOBIN GLYCOSYLATED A1C: CPT

## 2018-10-09 PROCEDURE — 6370000000 HC RX 637 (ALT 250 FOR IP): Performed by: INTERNAL MEDICINE

## 2018-10-09 PROCEDURE — 84484 ASSAY OF TROPONIN QUANT: CPT

## 2018-10-09 PROCEDURE — 71045 X-RAY EXAM CHEST 1 VIEW: CPT

## 2018-10-09 PROCEDURE — 6370000000 HC RX 637 (ALT 250 FOR IP): Performed by: EMERGENCY MEDICINE

## 2018-10-09 PROCEDURE — 99285 EMERGENCY DEPT VISIT HI MDM: CPT

## 2018-10-09 PROCEDURE — 6360000002 HC RX W HCPCS: Performed by: EMERGENCY MEDICINE

## 2018-10-09 PROCEDURE — 85025 COMPLETE CBC W/AUTO DIFF WBC: CPT

## 2018-10-09 PROCEDURE — 2580000003 HC RX 258: Performed by: INTERNAL MEDICINE

## 2018-10-09 PROCEDURE — 96374 THER/PROPH/DIAG INJ IV PUSH: CPT

## 2018-10-09 PROCEDURE — 82962 GLUCOSE BLOOD TEST: CPT

## 2018-10-09 PROCEDURE — 83880 ASSAY OF NATRIURETIC PEPTIDE: CPT

## 2018-10-09 RX ORDER — ONDANSETRON 2 MG/ML
4 INJECTION INTRAMUSCULAR; INTRAVENOUS EVERY 6 HOURS PRN
Status: DISCONTINUED | OUTPATIENT
Start: 2018-10-09 | End: 2018-10-12 | Stop reason: HOSPADM

## 2018-10-09 RX ORDER — DEXTROSE MONOHYDRATE 25 G/50ML
12.5 INJECTION, SOLUTION INTRAVENOUS PRN
Status: DISCONTINUED | OUTPATIENT
Start: 2018-10-09 | End: 2018-10-12 | Stop reason: HOSPADM

## 2018-10-09 RX ORDER — SODIUM CHLORIDE 0.9 % (FLUSH) 0.9 %
10 SYRINGE (ML) INJECTION PRN
Status: CANCELLED | OUTPATIENT
Start: 2018-10-09

## 2018-10-09 RX ORDER — SODIUM CHLORIDE 0.9 % (FLUSH) 0.9 %
10 SYRINGE (ML) INJECTION EVERY 12 HOURS SCHEDULED
Status: CANCELLED | OUTPATIENT
Start: 2018-10-10

## 2018-10-09 RX ORDER — DEXTROSE MONOHYDRATE 50 MG/ML
100 INJECTION, SOLUTION INTRAVENOUS PRN
Status: DISCONTINUED | OUTPATIENT
Start: 2018-10-09 | End: 2018-10-12 | Stop reason: HOSPADM

## 2018-10-09 RX ORDER — ASPIRIN 81 MG/1
324 TABLET, CHEWABLE ORAL ONCE
Status: COMPLETED | OUTPATIENT
Start: 2018-10-09 | End: 2018-10-09

## 2018-10-09 RX ORDER — M-VIT,TX,IRON,MINS/CALC/FOLIC 27MG-0.4MG
1 TABLET ORAL NIGHTLY
Status: DISCONTINUED | OUTPATIENT
Start: 2018-10-09 | End: 2018-10-12 | Stop reason: HOSPADM

## 2018-10-09 RX ORDER — ONDANSETRON 2 MG/ML
4 INJECTION INTRAMUSCULAR; INTRAVENOUS ONCE
Status: COMPLETED | OUTPATIENT
Start: 2018-10-09 | End: 2018-10-09

## 2018-10-09 RX ORDER — 0.9 % SODIUM CHLORIDE 0.9 %
500 INTRAVENOUS SOLUTION INTRAVENOUS ONCE
Status: COMPLETED | OUTPATIENT
Start: 2018-10-09 | End: 2018-10-09

## 2018-10-09 RX ORDER — CETIRIZINE HYDROCHLORIDE 10 MG/1
10 TABLET ORAL DAILY
Status: DISCONTINUED | OUTPATIENT
Start: 2018-10-10 | End: 2018-10-12 | Stop reason: HOSPADM

## 2018-10-09 RX ORDER — GLIPIZIDE 5 MG/1
10 TABLET ORAL
Status: DISCONTINUED | OUTPATIENT
Start: 2018-10-10 | End: 2018-10-12 | Stop reason: HOSPADM

## 2018-10-09 RX ORDER — LISINOPRIL 20 MG/1
20 TABLET ORAL DAILY
Status: DISCONTINUED | OUTPATIENT
Start: 2018-10-10 | End: 2018-10-12 | Stop reason: HOSPADM

## 2018-10-09 RX ORDER — DIPHENHYDRAMINE HCL 25 MG
50 TABLET ORAL ONCE
Status: DISCONTINUED | OUTPATIENT
Start: 2018-10-10 | End: 2018-10-10

## 2018-10-09 RX ORDER — INSULIN GLARGINE 100 [IU]/ML
36 INJECTION, SOLUTION SUBCUTANEOUS NIGHTLY
COMMUNITY

## 2018-10-09 RX ORDER — FLUTICASONE PROPIONATE 50 MCG
2 SPRAY, SUSPENSION (ML) NASAL DAILY
Status: DISCONTINUED | OUTPATIENT
Start: 2018-10-10 | End: 2018-10-12 | Stop reason: HOSPADM

## 2018-10-09 RX ORDER — PANTOPRAZOLE SODIUM 40 MG/1
40 TABLET, DELAYED RELEASE ORAL DAILY
Status: DISCONTINUED | OUTPATIENT
Start: 2018-10-09 | End: 2018-10-12 | Stop reason: HOSPADM

## 2018-10-09 RX ORDER — LIDOCAINE 40 MG/G
1 CREAM TOPICAL 2 TIMES DAILY PRN
Status: DISCONTINUED | OUTPATIENT
Start: 2018-10-09 | End: 2018-10-12 | Stop reason: HOSPADM

## 2018-10-09 RX ORDER — FUROSEMIDE 20 MG/1
20 TABLET ORAL DAILY
Status: DISCONTINUED | OUTPATIENT
Start: 2018-10-10 | End: 2018-10-12 | Stop reason: HOSPADM

## 2018-10-09 RX ORDER — SODIUM CHLORIDE 9 MG/ML
INJECTION, SOLUTION INTRAVENOUS CONTINUOUS
Status: DISCONTINUED | OUTPATIENT
Start: 2018-10-09 | End: 2018-10-12 | Stop reason: HOSPADM

## 2018-10-09 RX ORDER — POLYETHYLENE GLYCOL 3350 17 G/17G
17 POWDER, FOR SOLUTION ORAL DAILY
Status: DISCONTINUED | OUTPATIENT
Start: 2018-10-10 | End: 2018-10-12 | Stop reason: HOSPADM

## 2018-10-09 RX ORDER — NICOTINE POLACRILEX 4 MG
15 LOZENGE BUCCAL PRN
Status: DISCONTINUED | OUTPATIENT
Start: 2018-10-09 | End: 2018-10-12 | Stop reason: HOSPADM

## 2018-10-09 RX ORDER — PRAVASTATIN SODIUM 20 MG
20 TABLET ORAL NIGHTLY
Status: DISCONTINUED | OUTPATIENT
Start: 2018-10-09 | End: 2018-10-12 | Stop reason: HOSPADM

## 2018-10-09 RX ORDER — ASPIRIN 81 MG/1
81 TABLET ORAL DAILY
Status: DISCONTINUED | OUTPATIENT
Start: 2018-10-10 | End: 2018-10-12 | Stop reason: HOSPADM

## 2018-10-09 RX ORDER — ACETAMINOPHEN 325 MG/1
650 TABLET ORAL EVERY 6 HOURS PRN
Status: DISCONTINUED | OUTPATIENT
Start: 2018-10-09 | End: 2018-10-12 | Stop reason: HOSPADM

## 2018-10-09 RX ORDER — METOPROLOL SUCCINATE 25 MG/1
25 TABLET, EXTENDED RELEASE ORAL NIGHTLY
Status: DISCONTINUED | OUTPATIENT
Start: 2018-10-09 | End: 2018-10-12 | Stop reason: HOSPADM

## 2018-10-09 RX ORDER — POLYETHYLENE GLYCOL 3350 17 G/17G
17 POWDER, FOR SOLUTION ORAL DAILY
Status: DISCONTINUED | OUTPATIENT
Start: 2018-10-10 | End: 2018-10-09 | Stop reason: DRUGHIGH

## 2018-10-09 RX ORDER — INSULIN GLARGINE 100 [IU]/ML
36 INJECTION, SOLUTION SUBCUTANEOUS NIGHTLY
Status: DISCONTINUED | OUTPATIENT
Start: 2018-10-09 | End: 2018-10-12 | Stop reason: HOSPADM

## 2018-10-09 RX ORDER — AMIODARONE HYDROCHLORIDE 200 MG/1
200 TABLET ORAL DAILY
Status: DISCONTINUED | OUTPATIENT
Start: 2018-10-10 | End: 2018-10-12 | Stop reason: HOSPADM

## 2018-10-09 RX ORDER — NITROGLYCERIN 0.4 MG/1
0.4 TABLET SUBLINGUAL EVERY 5 MIN PRN
Status: DISCONTINUED | OUTPATIENT
Start: 2018-10-09 | End: 2018-10-12 | Stop reason: HOSPADM

## 2018-10-09 RX ORDER — TAMSULOSIN HYDROCHLORIDE 0.4 MG/1
0.4 CAPSULE ORAL NIGHTLY
Status: DISCONTINUED | OUTPATIENT
Start: 2018-10-09 | End: 2018-10-12 | Stop reason: HOSPADM

## 2018-10-09 RX ADMIN — METOPROLOL SUCCINATE 25 MG: 25 TABLET, EXTENDED RELEASE ORAL at 20:07

## 2018-10-09 RX ADMIN — SODIUM CHLORIDE 500 ML: 9 INJECTION, SOLUTION INTRAVENOUS at 14:30

## 2018-10-09 RX ADMIN — ONDANSETRON 4 MG: 2 INJECTION INTRAMUSCULAR; INTRAVENOUS at 12:14

## 2018-10-09 RX ADMIN — ACETAMINOPHEN 650 MG: 325 TABLET, FILM COATED ORAL at 23:31

## 2018-10-09 RX ADMIN — ASPIRIN 81 MG CHEWABLE TABLET 324 MG: 81 TABLET CHEWABLE at 12:14

## 2018-10-09 RX ADMIN — SODIUM CHLORIDE: 9 INJECTION, SOLUTION INTRAVENOUS at 18:01

## 2018-10-09 RX ADMIN — MULTIPLE VITAMINS W/ MINERALS TAB 1 TABLET: TAB at 20:07

## 2018-10-09 RX ADMIN — PANTOPRAZOLE SODIUM 40 MG: 40 TABLET, DELAYED RELEASE ORAL at 18:02

## 2018-10-09 RX ADMIN — PRAVASTATIN SODIUM 20 MG: 20 TABLET ORAL at 20:07

## 2018-10-09 RX ADMIN — NITROGLYCERIN 0.5 INCH: 20 OINTMENT TOPICAL at 18:03

## 2018-10-09 RX ADMIN — METFORMIN HYDROCHLORIDE 1000 MG: 1000 TABLET, FILM COATED ORAL at 18:03

## 2018-10-09 RX ADMIN — INSULIN GLARGINE 36 UNITS: 100 INJECTION, SOLUTION SUBCUTANEOUS at 20:07

## 2018-10-09 RX ADMIN — TAMSULOSIN HYDROCHLORIDE 0.4 MG: 0.4 CAPSULE ORAL at 20:07

## 2018-10-09 RX ADMIN — INSULIN LISPRO 2 UNITS: 100 INJECTION, SOLUTION INTRAVENOUS; SUBCUTANEOUS at 20:08

## 2018-10-09 RX ADMIN — ENOXAPARIN SODIUM 120 MG: 120 INJECTION SUBCUTANEOUS at 14:49

## 2018-10-09 ASSESSMENT — ENCOUNTER SYMPTOMS
DIARRHEA: 0
SORE THROAT: 0
WHEEZING: 0
NAUSEA: 0
BACK PAIN: 1
EYE PAIN: 0
VOMITING: 0
COUGH: 0
ABDOMINAL PAIN: 0
SHORTNESS OF BREATH: 0

## 2018-10-09 ASSESSMENT — PAIN SCALES - GENERAL
PAINLEVEL_OUTOF10: 0
PAINLEVEL_OUTOF10: 4
PAINLEVEL_OUTOF10: 0
PAINLEVEL_OUTOF10: 0

## 2018-10-09 NOTE — H&P
Technique: Portable AP upright chest radiograph was obtained. Findings: The cardiomediastinal silhouette is stable in size and contours. Bilateral lungs and costophrenic angles are clear. There is no evidence of pneumothorax. No acute cardiopulmonary disease. Review of Systems: All bolded are positive, all others are negative. General:  Fever, chills, diaphoresis, fatigue, malaise, night sweats, weight loss  Psychological:  Anxiety, disorientation, hallucinations. ENT:  Epistaxis, headaches, vertigo, visual changes. Cardiovascular:  Chest pain, irregular heartbeats, palpitations, paroxysmal nocturnal dyspnea. Respiratory:  Shortness of breath, coughing, sputum production, hemoptysis, wheezing, orthopnea.   Gastrointestinal:  Nausea, vomiting, diarrhea, heartburn, constipation, abdominal pain, hematemesis, hematochezia, melena, acholic stools  Genito-Urinary:  Dysuria, urgency, frequency, hematuria  Musculoskeletal:  Joint pain, joint stiffness, joint swelling, muscle pain  Neurology:  Headache, focal neurological deficits, weakness, numbness, paresthesia  Derm:  Rashes, ulcers, excoriations, bruising  Extremities:  Decreased ROM, peripheral edema, mottling    Past Medical Hx:  Past Medical History:   Diagnosis Date    Arthritis     [facility]     Diabetes mellitus type 2, noninsulin dependent ([facility]     Dyspnea     History of cardiovascular stress test 10/05/2018    Lexiscan stress test    History of echocardiogram 12/28/2017    EF  55%    Hyperlipidemia     Hypertension     Preoperative clearance     on chart for surgery with KRISHAN SAMSON Guille 12/2015       Past Surgical Hx:  Past Surgical History:   Procedure Laterality Date    CARDIAC CATHETERIZATION  04/26/2012    CARDIOVASCULAR STRESS TEST  08/11    CHOLECYSTECTOMY      DIAGNOSTIC CARDIAC CATH LAB PROCEDURE  11/8/2007    Dr. Alphonse Hernández No CAD EF 59%    DIAGNOSTIC CARDIAC CATH LAB PROCEDURE  4/26/2010    Dr. Roula Enriquez: EF 66% Moderate ectasia involving RCA and cx       Family Hx:  Family History   Problem Relation Age of Onset    Cancer Mother     Mental Illness Sister     Heart Disease Brother         Home Medications:  Prior to Admission medications    Medication Sig Start Date End Date Taking? Authorizing Provider   insulin glargine (LANTUS) 100 UNIT/ML injection vial Inject 36 Units into the skin nightly   Yes Historical Provider, MD   polyethylene glycol (GLYCOLAX) powder Take 17 g by mouth daily   Yes Historical Provider, MD   lisinopril (PRINIVIL;ZESTRIL) 20 MG tablet Take 20 mg by mouth daily   Yes Historical Provider, MD   tamsulosin (FLOMAX) 0.4 MG capsule Take 0.4 mg by mouth nightly   Yes Historical Provider, MD   acetaminophen (TYLENOL) 325 MG tablet Take 650 mg by mouth every 6 hours as needed for Pain   Yes Historical Provider, MD   amiodarone (CORDARONE) 200 MG tablet Take 200 mg by mouth daily   Yes Historical Provider, MD   aspirin 81 MG EC tablet Take 81 mg by mouth daily   Yes Historical Provider, MD   budesonide-formoterol (SYMBICORT) 160-4.5 MCG/ACT AERO Inhale 2 puffs into the lungs 2 times daily   Yes Historical Provider, MD   cetirizine (ZYRTEC) 10 MG tablet Take 10 mg by mouth daily   Yes Historical Provider, MD   Cholecalciferol (VITAMIN D) 2000 units CAPS capsule Take 2,000 Units by mouth daily   Yes Historical Provider, MD   glipiZIDE (GLUCOTROL) 10 MG tablet Take 10 mg by mouth 2 times daily (before meals)   Yes Historical Provider, MD   Dextrose, Diabetic Use, (GLUCOSE) 1 g CHEW Take 1 tablet by mouth as needed (Low Blood Sugar)    Yes Historical Provider, MD   insulin aspart (NOVOLOG FLEXPEN) 100 UNIT/ML injection pen Inject 6 Units into the skin 3 times daily (before meals)   Yes Historical Provider, MD   lidocaine (LMX) 4 % cream Apply 1 g topically 2 times daily as needed for Pain Apply topically as needed.    Yes Historical Provider, MD   metoprolol succinate (TOPROL XL) 50 MG extended release tablet

## 2018-10-09 NOTE — ED PROVIDER NOTES
The patient is a 75-year-old male with a past history of hypertension, hyperlipidemia, diabetes, CHF, history of atrial fibrillation, who presents to the ED for the chief complaint of chest pain. Patient reports he was at a follow-up appointment at the [facility] [place] today and was sent here for further evaluation for his chest pain. Patient reports his chest pain started last night. His chest pain is located in the left chest. He describes it as an intermittent, sharp pain. He reports it radiates to both shoulders and left arm. It is not associated with any activity. Nothing makes it better or worse. He reports he has had this pain off and on for a long time. The patient reports he sees a cardiologist in [facility] [employer]. He reports a history of atrial fibrillation and has had 3 cardioversions in the past. The patient reports he was recently admitted into the hospital for chest pain and underwent a stress test and thinks it was okay. Patient is on his Xarelto. Patient denies having any aspirin today. The history is provided by the patient and a relative. Review of Systems   Constitutional: Negative for chills and fever. HENT: Negative for ear pain and sore throat. Eyes: Negative for pain and visual disturbance. Respiratory: Negative for cough, shortness of breath and wheezing. Cardiovascular: Positive for chest pain. Negative for palpitations and leg swelling. Gastrointestinal: Negative for abdominal pain, diarrhea, nausea and vomiting. Genitourinary: Negative for dysuria and frequency. Musculoskeletal: Positive for arthralgias and back pain. Negative for neck pain. Skin: Negative for rash and wound. Neurological: Negative for dizziness, weakness, light-headedness, numbness and headaches. All other systems reviewed and are negative.       BP (!) 156/82   Pulse 79   Temp 97.6 °F (36.4 °C) (Oral)   Resp 20   Ht 6' (1.829 m)   Wt 260 lb 1 oz (118 kg)   SpO2 98%   BMI 35.27 kg/m² Basophils % 1.0 0.0 - 2.0 %    Neutrophils # 4.32 1.80 - 7.30 E9/L    Lymphocytes # 3.69 1.50 - 4.00 E9/L    Monocytes # 0.90 0.10 - 0.95 E9/L    Eosinophils # 0.00 (L) 0.05 - 0.50 E9/L    Basophils # 0.09 0.00 - 0.20 E9/L    RBC Morphology Normal    Basic Metabolic Panel   Result Value Ref Range    Sodium 141 132 - 146 mmol/L    Potassium 4.7 3.5 - 5.0 mmol/L    Chloride 102 98 - 107 mmol/L    CO2 23 22 - 29 mmol/L    Anion Gap 16 7 - 16 mmol/L    Glucose 67 (L) 74 - 109 mg/dL    BUN 29 (H) 8 - 23 mg/dL    CREATININE 1.7 (H) 0.7 - 1.2 mg/dL    GFR Non-African American 39 >=60 mL/min/1.73    GFR African American 48     Calcium 9.1 8.6 - 10.2 mg/dL   Troponin   Result Value Ref Range    Troponin <0.01 0.00 - 0.03 ng/mL   Brain Natriuretic Peptide   Result Value Ref Range    Pro-BNP 46 0 - 450 pg/mL   Platelet Confirmation   Result Value Ref Range    Platelet Confirmation CONFIRMED    POCT Glucose   Result Value Ref Range    Glucose 75 mg/dL   POCT Glucose   Result Value Ref Range    Meter Glucose 75 70 - 110 mg/dL   POCT Glucose   Result Value Ref Range    Meter Glucose 175 (H) 70 - 110 mg/dL   EKG 12 Lead   Result Value Ref Range    Ventricular Rate 77 BPM    Atrial Rate 77 BPM    P-R Interval 236 ms    QRS Duration 90 ms    Q-T Interval 398 ms    QTc Calculation (Bazett) 450 ms    P Axis 46 degrees    R Axis -22 degrees    T Axis 38 degrees       Radiology  Xr Chest Portable    Result Date: 10/9/2018  Reading location: [facility] DATE OF SERVICE: 10/9/2018 11:45 AM EXAM: Portable chest x-ray - 1 view CLINICAL HISTORY: Left arm pain radiating across the chest, diabetes, history of smoking. TECHNIQUE: Portable AP view of the chest dated 10/9/2018 at 12:03 PM. COMPARISON: AP view of the chest dated 10/4/2018. FINDINGS: Heart size is magnified by AP technique and is within normal limits to borderline enlarged, probably unchanged. Pulmonary vasculature is within normal limits.   The lungs are clear of well-defined infiltrate

## 2018-10-10 LAB
ALBUMIN SERPL-MCNC: 4.1 G/DL (ref 3.5–5.2)
ALP BLD-CCNC: 50 U/L (ref 40–129)
ALT SERPL-CCNC: 21 U/L (ref 0–40)
ANION GAP SERPL CALCULATED.3IONS-SCNC: 14 MMOL/L (ref 7–16)
AST SERPL-CCNC: 23 U/L (ref 0–39)
BILIRUB SERPL-MCNC: 0.7 MG/DL (ref 0–1.2)
BUN BLDV-MCNC: 23 MG/DL (ref 8–23)
CALCIUM SERPL-MCNC: 8.9 MG/DL (ref 8.6–10.2)
CHLORIDE BLD-SCNC: 100 MMOL/L (ref 98–107)
CO2: 22 MMOL/L (ref 22–29)
CREAT SERPL-MCNC: 1.5 MG/DL (ref 0.7–1.2)
GFR AFRICAN AMERICAN: 55
GFR NON-AFRICAN AMERICAN: 46 ML/MIN/1.73
GLUCOSE BLD-MCNC: 320 MG/DL (ref 74–109)
HBA1C MFR BLD: 7.1 % (ref 4–5.6)
MAGNESIUM: 1.6 MG/DL (ref 1.6–2.6)
METER GLUCOSE: 217 MG/DL (ref 70–110)
METER GLUCOSE: 272 MG/DL (ref 70–110)
METER GLUCOSE: 318 MG/DL (ref 70–110)
METER GLUCOSE: 348 MG/DL (ref 70–110)
PHOSPHORUS: 2.3 MG/DL (ref 2.5–4.5)
POTASSIUM SERPL-SCNC: 5.9 MMOL/L (ref 3.5–5)
SODIUM BLD-SCNC: 136 MMOL/L (ref 132–146)
TOTAL PROTEIN: 6.6 G/DL (ref 6.4–8.3)

## 2018-10-10 PROCEDURE — 80053 COMPREHEN METABOLIC PANEL: CPT

## 2018-10-10 PROCEDURE — G0378 HOSPITAL OBSERVATION PER HR: HCPCS

## 2018-10-10 PROCEDURE — 94640 AIRWAY INHALATION TREATMENT: CPT

## 2018-10-10 PROCEDURE — 82962 GLUCOSE BLOOD TEST: CPT

## 2018-10-10 PROCEDURE — 1200000000 HC SEMI PRIVATE

## 2018-10-10 PROCEDURE — 36415 COLL VENOUS BLD VENIPUNCTURE: CPT

## 2018-10-10 PROCEDURE — 6370000000 HC RX 637 (ALT 250 FOR IP): Performed by: INTERNAL MEDICINE

## 2018-10-10 PROCEDURE — 84100 ASSAY OF PHOSPHORUS: CPT

## 2018-10-10 PROCEDURE — 83735 ASSAY OF MAGNESIUM: CPT

## 2018-10-10 PROCEDURE — 2580000003 HC RX 258: Performed by: INTERNAL MEDICINE

## 2018-10-10 RX ADMIN — ASPIRIN 81 MG: 81 TABLET ORAL at 08:14

## 2018-10-10 RX ADMIN — LISINOPRIL 20 MG: 20 TABLET ORAL at 08:14

## 2018-10-10 RX ADMIN — INSULIN LISPRO 8 UNITS: 100 INJECTION, SOLUTION INTRAVENOUS; SUBCUTANEOUS at 13:00

## 2018-10-10 RX ADMIN — TAMSULOSIN HYDROCHLORIDE 0.4 MG: 0.4 CAPSULE ORAL at 21:15

## 2018-10-10 RX ADMIN — ACETAMINOPHEN 650 MG: 325 TABLET, FILM COATED ORAL at 08:39

## 2018-10-10 RX ADMIN — FLUTICASONE PROPIONATE 2 SPRAY: 50 SPRAY, METERED NASAL at 08:18

## 2018-10-10 RX ADMIN — NITROGLYCERIN 0.5 INCH: 20 OINTMENT TOPICAL at 23:51

## 2018-10-10 RX ADMIN — FUROSEMIDE 20 MG: 20 TABLET ORAL at 08:14

## 2018-10-10 RX ADMIN — INSULIN LISPRO 4 UNITS: 100 INJECTION, SOLUTION INTRAVENOUS; SUBCUTANEOUS at 08:18

## 2018-10-10 RX ADMIN — LIDOCAINE 1 G: 40 CREAM TOPICAL at 08:36

## 2018-10-10 RX ADMIN — AMIODARONE HYDROCHLORIDE 200 MG: 200 TABLET ORAL at 08:14

## 2018-10-10 RX ADMIN — NITROGLYCERIN 0.5 INCH: 20 OINTMENT TOPICAL at 17:20

## 2018-10-10 RX ADMIN — ACETAMINOPHEN 650 MG: 325 TABLET, FILM COATED ORAL at 23:51

## 2018-10-10 RX ADMIN — INSULIN GLARGINE 36 UNITS: 100 INJECTION, SOLUTION SUBCUTANEOUS at 21:18

## 2018-10-10 RX ADMIN — PREDNISONE 50 MG: 20 TABLET ORAL at 00:43

## 2018-10-10 RX ADMIN — GLIPIZIDE 10 MG: 5 TABLET ORAL at 06:29

## 2018-10-10 RX ADMIN — PRAVASTATIN SODIUM 20 MG: 20 TABLET ORAL at 21:15

## 2018-10-10 RX ADMIN — MOMETASONE FUROATE AND FORMOTEROL FUMARATE DIHYDRATE 2 PUFF: 100; 5 AEROSOL RESPIRATORY (INHALATION) at 19:20

## 2018-10-10 RX ADMIN — NITROGLYCERIN 0.5 INCH: 20 OINTMENT TOPICAL at 13:33

## 2018-10-10 RX ADMIN — CETIRIZINE HYDROCHLORIDE 10 MG: 10 TABLET, FILM COATED ORAL at 08:14

## 2018-10-10 RX ADMIN — SODIUM CHLORIDE: 9 INJECTION, SOLUTION INTRAVENOUS at 13:36

## 2018-10-10 RX ADMIN — INSULIN LISPRO 8 UNITS: 100 INJECTION, SOLUTION INTRAVENOUS; SUBCUTANEOUS at 17:20

## 2018-10-10 RX ADMIN — NITROGLYCERIN 0.5 INCH: 20 OINTMENT TOPICAL at 00:38

## 2018-10-10 RX ADMIN — PREDNISONE 50 MG: 20 TABLET ORAL at 05:11

## 2018-10-10 RX ADMIN — MOMETASONE FUROATE AND FORMOTEROL FUMARATE DIHYDRATE 2 PUFF: 100; 5 AEROSOL RESPIRATORY (INHALATION) at 06:12

## 2018-10-10 RX ADMIN — INSULIN LISPRO 3 UNITS: 100 INJECTION, SOLUTION INTRAVENOUS; SUBCUTANEOUS at 21:17

## 2018-10-10 RX ADMIN — NITROGLYCERIN 0.5 INCH: 20 OINTMENT TOPICAL at 06:29

## 2018-10-10 RX ADMIN — PANTOPRAZOLE SODIUM 40 MG: 40 TABLET, DELAYED RELEASE ORAL at 08:14

## 2018-10-10 RX ADMIN — VITAMIN D, TAB 1000IU (100/BT) 2000 UNITS: 25 TAB at 08:14

## 2018-10-10 RX ADMIN — MULTIPLE VITAMINS W/ MINERALS TAB 1 TABLET: TAB at 21:15

## 2018-10-10 RX ADMIN — METOPROLOL SUCCINATE 25 MG: 25 TABLET, EXTENDED RELEASE ORAL at 21:15

## 2018-10-10 RX ADMIN — LIDOCAINE 1 G: 40 CREAM TOPICAL at 00:47

## 2018-10-10 RX ADMIN — POLYETHYLENE GLYCOL (3350) 17 G: 17 POWDER, FOR SOLUTION ORAL at 08:15

## 2018-10-10 RX ADMIN — SODIUM CHLORIDE: 9 INJECTION, SOLUTION INTRAVENOUS at 03:49

## 2018-10-10 ASSESSMENT — PAIN SCALES - GENERAL
PAINLEVEL_OUTOF10: 5
PAINLEVEL_OUTOF10: 3
PAINLEVEL_OUTOF10: 0
PAINLEVEL_OUTOF10: 5

## 2018-10-10 ASSESSMENT — PAIN DESCRIPTION - DESCRIPTORS
DESCRIPTORS: ACHING;DISCOMFORT;DULL
DESCRIPTORS: ACHING;DISCOMFORT;NAGGING

## 2018-10-10 ASSESSMENT — PAIN DESCRIPTION - LOCATION
LOCATION: SHOULDER
LOCATION: SHOULDER

## 2018-10-10 ASSESSMENT — PAIN DESCRIPTION - ORIENTATION
ORIENTATION: LEFT;RIGHT
ORIENTATION: RIGHT;LEFT

## 2018-10-10 ASSESSMENT — PAIN DESCRIPTION - PAIN TYPE
TYPE: CHRONIC PAIN
TYPE: CHRONIC PAIN

## 2018-10-10 NOTE — PROGRESS NOTES
[facility] Quality Flow/Interdisciplinary Rounds Progress Note        Quality Flow Rounds held on October 10, 2018    Disciplines Attending:  Bedside Nurse, ,  and Nursing Unit Leadership    Julio Jade was admitted on 10/9/2018 11:17 AM    Anticipated Discharge Date:  Expected Discharge Date: 10/12/18    Disposition:    Tim Score:  Tim Scale Score: 21    Readmission Risk              Risk of Unplanned Readmission:        21             Discussed patient goal for the day, patient clinical progression, and barriers to discharge.   The following Goal(s) of the Day/Commitment(s) have been identified:  Heart Cath today      Sylvia Krueger  October 10, 2018

## 2018-10-10 NOTE — PROGRESS NOTES
Nightly    nitroglycerin  0.5 inch Topical 4 times per day    insulin lispro  0-12 Units Subcutaneous TID WC    insulin lispro  0-6 Units Subcutaneous Nightly    polyethylene glycol  17 g Oral Daily        Assessment; Active Problems:    Acute coronary syndrome (HCC)    Hypertension    Hyperlipidemia    Diabetes mellitus type 2, noninsulin dependent (HCC)    [facility]      Plan:   Transfer sales with hospital when okay with Dr. Enrrique Velasquez fluids  Labs in a.m. See orders.         John Baez DO  11:33 AM  10/10/2018

## 2018-10-11 LAB
ANION GAP SERPL CALCULATED.3IONS-SCNC: 12 MMOL/L (ref 7–16)
BUN BLDV-MCNC: 22 MG/DL (ref 8–23)
CALCIUM SERPL-MCNC: 8.8 MG/DL (ref 8.6–10.2)
CHLORIDE BLD-SCNC: 104 MMOL/L (ref 98–107)
CO2: 23 MMOL/L (ref 22–29)
CREAT SERPL-MCNC: 1.4 MG/DL (ref 0.7–1.2)
GFR AFRICAN AMERICAN: 60
GFR NON-AFRICAN AMERICAN: 49 ML/MIN/1.73
GLUCOSE BLD-MCNC: 225 MG/DL (ref 74–109)
LV EF: 55 %
LVEF MODALITY: NORMAL
METER GLUCOSE: 144 MG/DL (ref 70–110)
METER GLUCOSE: 210 MG/DL (ref 70–110)
METER GLUCOSE: 230 MG/DL (ref 70–110)
METER GLUCOSE: 231 MG/DL (ref 70–110)
POTASSIUM SERPL-SCNC: 4.7 MMOL/L (ref 3.5–5)
SODIUM BLD-SCNC: 139 MMOL/L (ref 132–146)

## 2018-10-11 PROCEDURE — G0378 HOSPITAL OBSERVATION PER HR: HCPCS

## 2018-10-11 PROCEDURE — 1200000000 HC SEMI PRIVATE

## 2018-10-11 PROCEDURE — 36415 COLL VENOUS BLD VENIPUNCTURE: CPT

## 2018-10-11 PROCEDURE — 6370000000 HC RX 637 (ALT 250 FOR IP): Performed by: INTERNAL MEDICINE

## 2018-10-11 PROCEDURE — 80048 BASIC METABOLIC PNL TOTAL CA: CPT

## 2018-10-11 PROCEDURE — 93306 TTE W/DOPPLER COMPLETE: CPT

## 2018-10-11 PROCEDURE — 82962 GLUCOSE BLOOD TEST: CPT

## 2018-10-11 PROCEDURE — 2580000003 HC RX 258: Performed by: INTERNAL MEDICINE

## 2018-10-11 PROCEDURE — 94640 AIRWAY INHALATION TREATMENT: CPT

## 2018-10-11 RX ADMIN — NITROGLYCERIN 0.5 INCH: 20 OINTMENT TOPICAL at 17:47

## 2018-10-11 RX ADMIN — METOPROLOL SUCCINATE 25 MG: 25 TABLET, EXTENDED RELEASE ORAL at 21:06

## 2018-10-11 RX ADMIN — VITAMIN D, TAB 1000IU (100/BT) 2000 UNITS: 25 TAB at 07:54

## 2018-10-11 RX ADMIN — TAMSULOSIN HYDROCHLORIDE 0.4 MG: 0.4 CAPSULE ORAL at 21:06

## 2018-10-11 RX ADMIN — MOMETASONE FUROATE AND FORMOTEROL FUMARATE DIHYDRATE 2 PUFF: 100; 5 AEROSOL RESPIRATORY (INHALATION) at 18:39

## 2018-10-11 RX ADMIN — LIDOCAINE 1 G: 40 CREAM TOPICAL at 00:24

## 2018-10-11 RX ADMIN — GLIPIZIDE 10 MG: 5 TABLET ORAL at 06:16

## 2018-10-11 RX ADMIN — LISINOPRIL 20 MG: 20 TABLET ORAL at 07:54

## 2018-10-11 RX ADMIN — PANTOPRAZOLE SODIUM 40 MG: 40 TABLET, DELAYED RELEASE ORAL at 07:54

## 2018-10-11 RX ADMIN — INSULIN LISPRO 4 UNITS: 100 INJECTION, SOLUTION INTRAVENOUS; SUBCUTANEOUS at 11:48

## 2018-10-11 RX ADMIN — AMIODARONE HYDROCHLORIDE 200 MG: 200 TABLET ORAL at 07:54

## 2018-10-11 RX ADMIN — MULTIPLE VITAMINS W/ MINERALS TAB 1 TABLET: TAB at 21:06

## 2018-10-11 RX ADMIN — INSULIN LISPRO 2 UNITS: 100 INJECTION, SOLUTION INTRAVENOUS; SUBCUTANEOUS at 21:13

## 2018-10-11 RX ADMIN — CETIRIZINE HYDROCHLORIDE 10 MG: 10 TABLET, FILM COATED ORAL at 07:54

## 2018-10-11 RX ADMIN — ASPIRIN 81 MG: 81 TABLET ORAL at 07:54

## 2018-10-11 RX ADMIN — MOMETASONE FUROATE AND FORMOTEROL FUMARATE DIHYDRATE 2 PUFF: 100; 5 AEROSOL RESPIRATORY (INHALATION) at 06:31

## 2018-10-11 RX ADMIN — PRAVASTATIN SODIUM 20 MG: 20 TABLET ORAL at 21:06

## 2018-10-11 RX ADMIN — SODIUM CHLORIDE: 9 INJECTION, SOLUTION INTRAVENOUS at 07:53

## 2018-10-11 RX ADMIN — FUROSEMIDE 20 MG: 20 TABLET ORAL at 07:54

## 2018-10-11 RX ADMIN — POLYETHYLENE GLYCOL (3350) 17 G: 17 POWDER, FOR SOLUTION ORAL at 07:54

## 2018-10-11 RX ADMIN — INSULIN LISPRO 2 UNITS: 100 INJECTION, SOLUTION INTRAVENOUS; SUBCUTANEOUS at 17:48

## 2018-10-11 RX ADMIN — FLUTICASONE PROPIONATE 2 SPRAY: 50 SPRAY, METERED NASAL at 07:53

## 2018-10-11 RX ADMIN — NITROGLYCERIN 0.5 INCH: 20 OINTMENT TOPICAL at 06:16

## 2018-10-11 RX ADMIN — NITROGLYCERIN 0.5 INCH: 20 OINTMENT TOPICAL at 11:47

## 2018-10-11 RX ADMIN — INSULIN LISPRO 4 UNITS: 100 INJECTION, SOLUTION INTRAVENOUS; SUBCUTANEOUS at 07:56

## 2018-10-11 RX ADMIN — INSULIN GLARGINE 36 UNITS: 100 INJECTION, SOLUTION SUBCUTANEOUS at 21:13

## 2018-10-11 RX ADMIN — ACETAMINOPHEN 650 MG: 325 TABLET, FILM COATED ORAL at 21:11

## 2018-10-11 RX ADMIN — INSULIN LISPRO 6 UNITS: 100 INJECTION, SOLUTION INTRAVENOUS; SUBCUTANEOUS at 17:49

## 2018-10-11 ASSESSMENT — PAIN SCALES - GENERAL
PAINLEVEL_OUTOF10: 0
PAINLEVEL_OUTOF10: 8
PAINLEVEL_OUTOF10: 0
PAINLEVEL_OUTOF10: 0

## 2018-10-11 NOTE — PROGRESS NOTES
10/10/2018     PT/INR:    Lab Results   Component Value Date    PROTIME 19.6 12/28/2017    PROTIME 13.3 04/23/2012    INR 1.7 12/28/2017     Troponin:    Lab Results   Component Value Date    TROPONINI <0.01 10/09/2018     U/A:    Lab Results   Component Value Date    COLORU Yellow 03/02/2017    PROTEINU Negative 03/02/2017    PHUR 6.0 03/02/2017    WBCUA 5-10 03/02/2017    WBCUA NONE 04/23/2012    RBCUA NONE 03/02/2017    RBCUA 0-1 10/01/2013    BACTERIA NONE 03/02/2017    CLARITYU Clear 03/02/2017    SPECGRAV 1.010 03/02/2017    LEUKOCYTESUR MODERATE 03/02/2017    UROBILINOGEN 1.0 03/02/2017    BILIRUBINUR Negative 03/02/2017    BILIRUBINUR NEGATIVE 04/23/2012    BLOODU TRACE 03/02/2017    GLUCOSEU 500 03/02/2017    GLUCOSEU >=1000 04/23/2012     HgBA1c:    Lab Results   Component Value Date    LABA1C 7.1 10/09/2018     FLP:    Lab Results   Component Value Date    TRIG 169 10/05/2018    HDL 29 10/05/2018    LDLCALC 51 10/05/2018    LABVLDL 34 10/05/2018     TSH:    Lab Results   Component Value Date    TSH 4.150 10/05/2018     LIPASE:    Lab Results   Component Value Date    LIPASE 24 03/22/2015       Recent Labs      10/09/18   1607  10/09/18   2006  10/10/18   0628  10/10/18   1108  10/10/18   1532  10/10/18   1950  10/11/18   0615  10/11/18   1102   GLUMET  175*  248*  217*  348*  318*  272*  231*  230*       XR CHEST PORTABLE   Final Result      No radiographic evidence for active cardiopulmonary disease.            insulin lispro  6 Units Subcutaneous TID WC    pravastatin  20 mg Oral Nightly    therapeutic multivitamin-minerals  1 tablet Oral Nightly    fluticasone  2 spray Nasal Daily    furosemide  20 mg Oral Daily    lisinopril  20 mg Oral Daily    tamsulosin  0.4 mg Oral Nightly    amiodarone  200 mg Oral Daily    aspirin  81 mg Oral Daily    mometasone-formoterol  2 puff Inhalation BID    cetirizine  10 mg Oral Daily    vitamin D  2,000 Units Oral Daily    glipiZIDE  10 mg Oral QAM AC   

## 2018-10-11 NOTE — PROGRESS NOTES
Patient is seen for chest pain     Subjective:     Mr. Derik Adrian  Denies any chest pain or shortness of breath   Sitting in chair, in no acute distress     ROS:  CONSTITUTIONAL:  negative for  fevers, chills  HEENT:  negative for earaches, nasal congestion and epistaxis  RESPIRATORY:  negative for  dry cough, cough with sputum,wheezing and hemoptysis  GASTROINTESTINAL:  negative for nausea, vomiting  MUSCULOSKELETAL:  negative for  myalgias, arthralgias  NEUROLOGICAL:  negative for visual disturbance, dysphagia    Medication side effects: none    Scheduled Meds:   insulin lispro  6 Units Subcutaneous TID WC    pravastatin  20 mg Oral Nightly    therapeutic multivitamin-minerals  1 tablet Oral Nightly    fluticasone  2 spray Nasal Daily    furosemide  20 mg Oral Daily    lisinopril  20 mg Oral Daily    tamsulosin  0.4 mg Oral Nightly    amiodarone  200 mg Oral Daily    aspirin  81 mg Oral Daily    mometasone-formoterol  2 puff Inhalation BID    cetirizine  10 mg Oral Daily    vitamin D  2,000 Units Oral Daily    glipiZIDE  10 mg Oral QAM AC    metoprolol succinate  25 mg Oral Nightly    pantoprazole  40 mg Oral Daily    insulin glargine  36 Units Subcutaneous Nightly    nitroglycerin  0.5 inch Topical 4 times per day    insulin lispro  0-12 Units Subcutaneous TID WC    insulin lispro  0-6 Units Subcutaneous Nightly    polyethylene glycol  17 g Oral Daily     Continuous Infusions:   sodium chloride 50 mL/hr at 10/11/18 0753    dextrose       PRN Meds:perflutren lipid microspheres, nitroGLYCERIN, acetaminophen, lidocaine, ondansetron, magnesium hydroxide, glucose, dextrose, glucagon (rDNA), dextrose      Objective:      Physical Exam:   /77   Pulse 68   Temp 97.7 °F (36.5 °C) (Oral)   Resp 18   Ht 6' (1.829 m)   Wt 260 lb 1 oz (118 kg)   SpO2 98%   BMI 35.27 kg/m²   CONSTITUTIONAL:  awake, alert, cooperative, no apparent distress, and appears stated age  LUNGS:  No increased work of reviewed the laboratory, cardiac diagnostic and radiographic testing as outlined above:    Assessment:        IMPRESSION:  1. Chest Pain: Atypical. Troponin negative x3. No significant EKG changes. Recent lexiscan stress test read as negative for ischemia. Currently asymptomatic. However, 4 days after patients stress test his chest pain reoccurred, therefor patient will benefit from further evaluation with cardiac catheterization. Will plan for catheterization on 10/12. 2. Hypertension: Essential. Controled. 3. Atrial Fibrillation: Is currently in Sinus Rhythm. Chronically anticoagulated with  Xarelto, which is being held due to upcoming cardiac cath  4. Cardiac murmur: will obtain echocardiogram   5. Hyperlipidemia   6. Type II Diabetes       Recommendations:   1. Will continue to hold metformin and Xarelto  2. Will continue current treatment   3. CBC and BMP in AM   4. Further cardiac recommendations forthcoming pending patients clinical progression and diagnostic test findings   5. Cardiac Catheterization tomorrow 10/12/2018: Indications, procedures, risks and benefits of cardiac catheterization were discussed with the patient with risks including, but not limited to, infection, vessel damage, bleeding, dye allergy, nephrotoxicity, dysrhythmia, MI, CVA and death as well as the potential need for emergent cardiac surgery. Patient verbalized understanding and is agreeable to proceed. Discussed with Patient   Discussed with Dr. Bernie Chin     Electronically signed by RICK Mendoza CNP on 10/11/2018 at 3:56 PM  I personally reviewed the history,and reviewed labs, radiology and monitor strips. I have reviewed the key elements of all parts of the encounter with  I agree with the assessment, plan and orders as documented by Bi Ramirez CNP. NOTE: This report was transcribed using voice recognition software.  Every effort was made to ensure accuracy; however, inadvertent computerized

## 2018-10-12 ENCOUNTER — HOSPITAL ENCOUNTER (OUTPATIENT)
Age: 73
Discharge: HOME | End: 2018-10-12
Payer: MEDICARE

## 2018-10-12 VITALS
TEMPERATURE: 97.8 F | HEART RATE: 78 BPM | OXYGEN SATURATION: 97 % | RESPIRATION RATE: 18 BRPM | WEIGHT: 261 LBS | SYSTOLIC BLOOD PRESSURE: 193 MMHG | DIASTOLIC BLOOD PRESSURE: 105 MMHG | BODY MASS INDEX: 35.35 KG/M2 | HEIGHT: 72 IN

## 2018-10-12 VITALS
HEART RATE: 72 BPM | WEIGHT: 260.06 LBS | DIASTOLIC BLOOD PRESSURE: 75 MMHG | OXYGEN SATURATION: 97 % | TEMPERATURE: 97.9 F | SYSTOLIC BLOOD PRESSURE: 134 MMHG | HEIGHT: 72 IN | RESPIRATION RATE: 17 BRPM | BODY MASS INDEX: 35.22 KG/M2

## 2018-10-12 DIAGNOSIS — I25.10 CAD IN NATIVE ARTERY: ICD-10-CM

## 2018-10-12 DIAGNOSIS — R07.89 OTHER CHEST PAIN: ICD-10-CM

## 2018-10-12 LAB
ABO/RH: NORMAL
ANTIBODY SCREEN: NORMAL
EKG ATRIAL RATE: 82 BPM
EKG P AXIS: 65 DEGREES
EKG P-R INTERVAL: 218 MS
EKG Q-T INTERVAL: 410 MS
EKG QRS DURATION: 100 MS
EKG QTC CALCULATION (BAZETT): 479 MS
EKG R AXIS: -16 DEGREES
EKG T AXIS: 60 DEGREES
EKG VENTRICULAR RATE: 82 BPM
METER GLUCOSE: 140 MG/DL (ref 70–110)

## 2018-10-12 PROCEDURE — 86850 RBC ANTIBODY SCREEN: CPT

## 2018-10-12 PROCEDURE — 2580000003 HC RX 258: Performed by: INTERNAL MEDICINE

## 2018-10-12 PROCEDURE — 6360000002 HC RX W HCPCS: Performed by: INTERNAL MEDICINE

## 2018-10-12 PROCEDURE — 93454 CORONARY ARTERY ANGIO S&I: CPT | Performed by: INTERNAL MEDICINE

## 2018-10-12 PROCEDURE — A0426 ALS 1: HCPCS

## 2018-10-12 PROCEDURE — 6360000002 HC RX W HCPCS

## 2018-10-12 PROCEDURE — 36415 COLL VENOUS BLD VENIPUNCTURE: CPT

## 2018-10-12 PROCEDURE — G0378 HOSPITAL OBSERVATION PER HR: HCPCS

## 2018-10-12 PROCEDURE — 2709999900 HC NON-CHARGEABLE SUPPLY

## 2018-10-12 PROCEDURE — C1887 CATHETER, GUIDING: HCPCS

## 2018-10-12 PROCEDURE — C1725 CATH, TRANSLUMIN NON-LASER: HCPCS

## 2018-10-12 PROCEDURE — 82962 GLUCOSE BLOOD TEST: CPT

## 2018-10-12 PROCEDURE — 2500000003 HC RX 250 WO HCPCS

## 2018-10-12 PROCEDURE — C1769 GUIDE WIRE: HCPCS

## 2018-10-12 PROCEDURE — 6370000000 HC RX 637 (ALT 250 FOR IP): Performed by: INTERNAL MEDICINE

## 2018-10-12 PROCEDURE — 86900 BLOOD TYPING SEROLOGIC ABO: CPT

## 2018-10-12 PROCEDURE — 94640 AIRWAY INHALATION TREATMENT: CPT

## 2018-10-12 PROCEDURE — 86901 BLOOD TYPING SEROLOGIC RH(D): CPT

## 2018-10-12 PROCEDURE — C1894 INTRO/SHEATH, NON-LASER: HCPCS

## 2018-10-12 PROCEDURE — A0425 GROUND MILEAGE: HCPCS

## 2018-10-12 RX ORDER — DIPHENHYDRAMINE HYDROCHLORIDE 50 MG/ML
50 INJECTION INTRAMUSCULAR; INTRAVENOUS ONCE
Status: COMPLETED | OUTPATIENT
Start: 2018-10-12 | End: 2018-10-12

## 2018-10-12 RX ORDER — GLIPIZIDE 10 MG/1
10 TABLET ORAL DAILY
Qty: 60 TABLET | Refills: 3
Start: 2018-10-12

## 2018-10-12 RX ORDER — SODIUM CHLORIDE 0.9 % (FLUSH) 0.9 %
10 SYRINGE (ML) INJECTION EVERY 12 HOURS SCHEDULED
Status: DISCONTINUED | OUTPATIENT
Start: 2018-10-12 | End: 2018-10-13 | Stop reason: HOSPADM

## 2018-10-12 RX ORDER — SODIUM CHLORIDE 0.9 % (FLUSH) 0.9 %
10 SYRINGE (ML) INJECTION PRN
Status: DISCONTINUED | OUTPATIENT
Start: 2018-10-12 | End: 2018-10-13 | Stop reason: HOSPADM

## 2018-10-12 RX ORDER — SODIUM CHLORIDE 0.9 % (FLUSH) 0.9 %
10 SYRINGE (ML) INJECTION EVERY 12 HOURS SCHEDULED
Status: CANCELLED | OUTPATIENT
Start: 2018-10-12

## 2018-10-12 RX ORDER — METHYLPREDNISOLONE SODIUM SUCCINATE 125 MG/2ML
125 INJECTION, POWDER, LYOPHILIZED, FOR SOLUTION INTRAMUSCULAR; INTRAVENOUS DAILY
Status: DISCONTINUED | OUTPATIENT
Start: 2018-10-12 | End: 2018-10-13 | Stop reason: HOSPADM

## 2018-10-12 RX ORDER — SODIUM CHLORIDE 0.9 % (FLUSH) 0.9 %
10 SYRINGE (ML) INJECTION PRN
Status: CANCELLED | OUTPATIENT
Start: 2018-10-12

## 2018-10-12 RX ORDER — ACETAMINOPHEN 325 MG/1
650 TABLET ORAL EVERY 4 HOURS PRN
Status: CANCELLED | OUTPATIENT
Start: 2018-10-12

## 2018-10-12 RX ORDER — DIPHENHYDRAMINE HYDROCHLORIDE 50 MG/ML
25 INJECTION INTRAMUSCULAR; INTRAVENOUS ONCE
Status: DISCONTINUED | OUTPATIENT
Start: 2018-10-12 | End: 2018-10-12

## 2018-10-12 RX ORDER — ONDANSETRON 2 MG/ML
4 INJECTION INTRAMUSCULAR; INTRAVENOUS EVERY 6 HOURS PRN
Status: CANCELLED | OUTPATIENT
Start: 2018-10-12

## 2018-10-12 RX ORDER — SODIUM CHLORIDE 9 MG/ML
INJECTION, SOLUTION INTRAVENOUS CONTINUOUS
Status: DISCONTINUED | OUTPATIENT
Start: 2018-10-12 | End: 2018-10-13 | Stop reason: HOSPADM

## 2018-10-12 RX ADMIN — NITROGLYCERIN 0.5 INCH: 20 OINTMENT TOPICAL at 06:07

## 2018-10-12 RX ADMIN — ASPIRIN 81 MG: 81 TABLET ORAL at 09:18

## 2018-10-12 RX ADMIN — GLIPIZIDE 10 MG: 5 TABLET ORAL at 06:08

## 2018-10-12 RX ADMIN — DIPHENHYDRAMINE HYDROCHLORIDE 50 MG: 50 INJECTION, SOLUTION INTRAMUSCULAR; INTRAVENOUS at 14:18

## 2018-10-12 RX ADMIN — NITROGLYCERIN 0.5 INCH: 20 OINTMENT TOPICAL at 00:01

## 2018-10-12 RX ADMIN — MOMETASONE FUROATE AND FORMOTEROL FUMARATE DIHYDRATE 2 PUFF: 100; 5 AEROSOL RESPIRATORY (INHALATION) at 06:30

## 2018-10-12 RX ADMIN — AMIODARONE HYDROCHLORIDE 200 MG: 200 TABLET ORAL at 09:18

## 2018-10-12 RX ADMIN — POLYETHYLENE GLYCOL (3350) 17 G: 17 POWDER, FOR SOLUTION ORAL at 09:17

## 2018-10-12 RX ADMIN — METHYLPREDNISOLONE SODIUM SUCCINATE 125 MG: 125 INJECTION, POWDER, LYOPHILIZED, FOR SOLUTION INTRAMUSCULAR; INTRAVENOUS at 14:18

## 2018-10-12 RX ADMIN — SODIUM CHLORIDE: 9 INJECTION, SOLUTION INTRAVENOUS at 04:30

## 2018-10-12 RX ADMIN — PANTOPRAZOLE SODIUM 40 MG: 40 TABLET, DELAYED RELEASE ORAL at 09:18

## 2018-10-12 RX ADMIN — SODIUM CHLORIDE: 9 INJECTION, SOLUTION INTRAVENOUS at 14:18

## 2018-10-12 RX ADMIN — FLUTICASONE PROPIONATE 2 SPRAY: 50 SPRAY, METERED NASAL at 09:17

## 2018-10-12 RX ADMIN — CETIRIZINE HYDROCHLORIDE 10 MG: 10 TABLET, FILM COATED ORAL at 09:18

## 2018-10-12 RX ADMIN — FUROSEMIDE 20 MG: 20 TABLET ORAL at 09:18

## 2018-10-12 RX ADMIN — LISINOPRIL 20 MG: 20 TABLET ORAL at 09:18

## 2018-10-12 RX ADMIN — VITAMIN D, TAB 1000IU (100/BT) 2000 UNITS: 25 TAB at 09:18

## 2018-10-12 ASSESSMENT — PAIN SCALES - GENERAL: PAINLEVEL_OUTOF10: 0

## 2018-10-12 NOTE — DISCHARGE SUMMARY
tablet  Commonly known as:  TYLENOL     amiodarone 200 MG tablet  Commonly known as:  CORDARONE     aspirin 81 MG EC tablet     cetirizine 10 MG tablet  Commonly known as:  ZYRTEC     fluticasone 50 MCG/ACT nasal spray  Commonly known as:  FLONASE     furosemide 20 MG tablet  Commonly known as:  LASIX  Take 1 tablet by mouth daily     Glucose 1 g Chew     lidocaine 4 % cream  Commonly known as:  LMX     lisinopril 20 MG tablet  Commonly known as:  PRINIVIL;ZESTRIL     metoprolol succinate 50 MG extended release tablet  Commonly known as:  TOPROL XL     nitroGLYCERIN 0.4 MG SL tablet  Commonly known as:  NITROSTAT     NOVOLOG FLEXPEN 100 UNIT/ML injection pen  Generic drug:  insulin aspart     pantoprazole 20 MG tablet  Commonly known as:  PROTONIX  Take 2 tablets by mouth daily     polyethylene glycol powder  Commonly known as:  GLYCOLAX     pravastatin 40 MG tablet  Commonly known as:  PRAVACHOL     SYMBICORT 160-4.5 MCG/ACT Aero  Generic drug:  budesonide-formoterol     tamsulosin 0.4 MG capsule  Commonly known as:  FLOMAX     therapeutic multivitamin-minerals tablet     vitamin D 2000 units Caps capsule        STOP taking these medications    rivaroxaban 20 MG Tabs tablet  Commonly known as:  Itzel Wilfred           Where to Get Your Medications      These medications were [facility] to [facility] [address] [facility] HARVEY [address] [place] [address] [facility] [phone] - F [phone]  [address] [place] [address]    Phone:  [phone]   · insulin lispro 100 UNIT/ML injection vial     Information about where to get these medications is not yet available    Ask your nurse or doctor about these medications  · glipiZIDE 10 MG tablet  · metFORMIN 1000 MG tablet         Follow-up / Instructions  · This patient is instructed to follow-up with his primary care physician within 7 days after discharged from [facility]. · Patient is instructed to follow-up with the consults listed above as directed by them.   · he is instructed to

## 2018-11-30 ENCOUNTER — APPOINTMENT (OUTPATIENT)
Dept: GENERAL RADIOLOGY | Age: 73
End: 2018-11-30
Payer: MEDICARE

## 2018-11-30 ENCOUNTER — HOSPITAL ENCOUNTER (EMERGENCY)
Age: 73
Discharge: HOME | End: 2018-11-30
Attending: EMERGENCY MEDICINE
Payer: MEDICARE

## 2018-11-30 VITALS
OXYGEN SATURATION: 97 % | BODY MASS INDEX: 35.76 KG/M2 | HEIGHT: 72 IN | SYSTOLIC BLOOD PRESSURE: 123 MMHG | HEART RATE: 66 BPM | TEMPERATURE: 98.4 F | WEIGHT: 264 LBS | RESPIRATION RATE: 17 BRPM | DIASTOLIC BLOOD PRESSURE: 82 MMHG

## 2018-11-30 DIAGNOSIS — R07.89 CHEST WALL PAIN: Primary | ICD-10-CM

## 2018-11-30 LAB
ANION GAP SERPL CALCULATED.3IONS-SCNC: 13 MMOL/L (ref 7–16)
BASOPHILS ABSOLUTE: 0.07 E9/L (ref 0–0.2)
BASOPHILS RELATIVE PERCENT: 0.9 % (ref 0–2)
BUN BLDV-MCNC: 18 MG/DL (ref 8–23)
CALCIUM SERPL-MCNC: 9.2 MG/DL (ref 8.6–10.2)
CHLORIDE BLD-SCNC: 98 MMOL/L (ref 98–107)
CO2: 27 MMOL/L (ref 22–29)
CREAT SERPL-MCNC: 1.3 MG/DL (ref 0.7–1.2)
EKG ATRIAL RATE: 73 BPM
EKG P AXIS: 54 DEGREES
EKG P-R INTERVAL: 238 MS
EKG Q-T INTERVAL: 428 MS
EKG QRS DURATION: 90 MS
EKG QTC CALCULATION (BAZETT): 471 MS
EKG R AXIS: -7 DEGREES
EKG T AXIS: 61 DEGREES
EKG VENTRICULAR RATE: 73 BPM
EOSINOPHILS ABSOLUTE: 0 E9/L (ref 0.05–0.5)
EOSINOPHILS RELATIVE PERCENT: 1.7 % (ref 0–6)
GFR AFRICAN AMERICAN: >60
GFR NON-AFRICAN AMERICAN: 54 ML/MIN/1.73
GLUCOSE BLD-MCNC: 190 MG/DL (ref 74–99)
HCT VFR BLD CALC: 44.2 % (ref 37–54)
HEMOGLOBIN: 15.1 G/DL (ref 12.5–16.5)
LYMPHOCYTES ABSOLUTE: 2.95 E9/L (ref 1.5–4)
LYMPHOCYTES RELATIVE PERCENT: 35.7 % (ref 20–42)
MCH RBC QN AUTO: 33.4 PG (ref 26–35)
MCHC RBC AUTO-ENTMCNC: 34.2 % (ref 32–34.5)
MCV RBC AUTO: 97.8 FL (ref 80–99.9)
MONOCYTES ABSOLUTE: 0.66 E9/L (ref 0.1–0.95)
MONOCYTES RELATIVE PERCENT: 7.8 % (ref 2–12)
MYELOCYTE PERCENT: 1.7 % (ref 0–0)
NEUTROPHILS ABSOLUTE: 4.59 E9/L (ref 1.8–7.3)
NEUTROPHILS RELATIVE PERCENT: 53.9 % (ref 43–80)
PDW BLD-RTO: 12.7 FL (ref 11.5–15)
PLATELET # BLD: 88 E9/L (ref 130–450)
PLATELET CONFIRMATION: NORMAL
PMV BLD AUTO: 9.8 FL (ref 7–12)
POLYCHROMASIA: ABNORMAL
POTASSIUM SERPL-SCNC: 4.3 MMOL/L (ref 3.5–5)
RBC # BLD: 4.52 E12/L (ref 3.8–5.8)
SODIUM BLD-SCNC: 138 MMOL/L (ref 132–146)
TROPONIN: <0.01 NG/ML (ref 0–0.03)
TROPONIN: <0.01 NG/ML (ref 0–0.03)
WBC # BLD: 8.2 E9/L (ref 4.5–11.5)

## 2018-11-30 PROCEDURE — 36415 COLL VENOUS BLD VENIPUNCTURE: CPT

## 2018-11-30 PROCEDURE — 84484 ASSAY OF TROPONIN QUANT: CPT

## 2018-11-30 PROCEDURE — 80048 BASIC METABOLIC PNL TOTAL CA: CPT

## 2018-11-30 PROCEDURE — 6360000002 HC RX W HCPCS: Performed by: STUDENT IN AN ORGANIZED HEALTH CARE EDUCATION/TRAINING PROGRAM

## 2018-11-30 PROCEDURE — 99285 EMERGENCY DEPT VISIT HI MDM: CPT

## 2018-11-30 PROCEDURE — 96374 THER/PROPH/DIAG INJ IV PUSH: CPT

## 2018-11-30 PROCEDURE — 85025 COMPLETE CBC W/AUTO DIFF WBC: CPT

## 2018-11-30 PROCEDURE — 71045 X-RAY EXAM CHEST 1 VIEW: CPT

## 2018-11-30 PROCEDURE — 6370000000 HC RX 637 (ALT 250 FOR IP): Performed by: STUDENT IN AN ORGANIZED HEALTH CARE EDUCATION/TRAINING PROGRAM

## 2018-11-30 RX ORDER — KETOROLAC TROMETHAMINE 15 MG/ML
15 INJECTION, SOLUTION INTRAMUSCULAR; INTRAVENOUS ONCE
Status: COMPLETED | OUTPATIENT
Start: 2018-11-30 | End: 2018-11-30

## 2018-11-30 RX ORDER — ASPIRIN 81 MG/1
243 TABLET, CHEWABLE ORAL ONCE
Status: COMPLETED | OUTPATIENT
Start: 2018-11-30 | End: 2018-11-30

## 2018-11-30 RX ORDER — NITROGLYCERIN 0.4 MG/1
0.4 TABLET SUBLINGUAL EVERY 5 MIN PRN
Status: DISCONTINUED | OUTPATIENT
Start: 2018-11-30 | End: 2018-11-30 | Stop reason: HOSPADM

## 2018-11-30 RX ADMIN — KETOROLAC TROMETHAMINE 15 MG: 15 INJECTION, SOLUTION INTRAMUSCULAR; INTRAVENOUS at 13:14

## 2018-11-30 RX ADMIN — ASPIRIN 81 MG CHEWABLE TABLET 243 MG: 81 TABLET CHEWABLE at 12:08

## 2018-11-30 RX ADMIN — NITROGLYCERIN 0.4 MG: 0.4 TABLET SUBLINGUAL at 13:01

## 2018-11-30 ASSESSMENT — ENCOUNTER SYMPTOMS
BLOOD IN STOOL: 0
RHINORRHEA: 0
VOMITING: 0
CONSTIPATION: 0
NAUSEA: 0
COUGH: 0
SHORTNESS OF BREATH: 0
ABDOMINAL PAIN: 0
BACK PAIN: 0
SORE THROAT: 0
CHEST TIGHTNESS: 1
WHEEZING: 0
DIARRHEA: 0

## 2018-11-30 ASSESSMENT — PAIN DESCRIPTION - PAIN TYPE: TYPE: ACUTE PAIN

## 2018-11-30 ASSESSMENT — PAIN DESCRIPTION - DESCRIPTORS: DESCRIPTORS: PRESSURE

## 2018-11-30 ASSESSMENT — PAIN DESCRIPTION - ORIENTATION: ORIENTATION: LEFT

## 2018-11-30 ASSESSMENT — PAIN SCALES - GENERAL
PAINLEVEL_OUTOF10: 3
PAINLEVEL_OUTOF10: 5

## 2018-11-30 ASSESSMENT — PAIN DESCRIPTION - LOCATION: LOCATION: CHEST

## 2018-11-30 ASSESSMENT — PAIN DESCRIPTION - FREQUENCY: FREQUENCY: INTERMITTENT

## 2018-11-30 NOTE — ED PROVIDER NOTES
No scleral icterus. Neck: Normal range of motion. Neck supple. No JVD present. No thyromegaly present. Cardiovascular: Normal rate, regular rhythm and intact distal pulses. Exam reveals no gallop. No murmur heard. Pulmonary/Chest: Effort normal. No stridor. No respiratory distress. He has no wheezes. He has no rales. He exhibits no tenderness. Abdominal: Soft. He exhibits no distension. There is no tenderness. There is no rebound and no guarding. Musculoskeletal: Normal range of motion. He exhibits no edema, tenderness or deformity. Lymphadenopathy:     He has no cervical adenopathy. Neurological: He is alert and oriented to person, place, and time. No cranial nerve deficit. Skin: Skin is warm and dry. Capillary refill takes less than 2 seconds. No rash noted. He is not diaphoretic. No erythema. Psychiatric: He has a normal mood and affect. His behavior is normal.   Nursing note and vitals reviewed. Procedures    MDM       EKG: This EKG is signed and interpreted by me. Rate: 73  Rhythm: Sinus  Interpretation: Sinus rhythm with first-degree AV block. No ST elevations or depressions. Left axis deviation. Comparison: stable as compared to patient's most recent EKG         --------------------------------------------- PAST HISTORY ---------------------------------------------  Past Medical History:  has a past medical history of Arthritis; Atrial fibrillation ([facility] [address] Diabetes mellitus type 2, noninsulin dependent ([facility] [address] Dyspnea; History of cardiovascular stress test; History of echocardiogram; Hyperlipidemia; Hypertension; and Preoperative clearance. Past Surgical History:  has a past surgical history that includes Cholecystectomy; cardiovascular stress test (08/11); Diagnostic Cardiac Cath Lab Procedure (11/8/2007); Diagnostic Cardiac Cath Lab Procedure (4/26/2010); Cardiac catheterization (04/26/2012); and Cardiac catheterization (10/12/2018).     Social History:  reports that he quit

## 2018-11-30 NOTE — CONSULTS
portable chest.              I have personally reviewed the laboratory, cardiac diagnostic and radiographic testing as outlined above:    IMPRESSION:  1. Chest Pain: Atypical. Troponin negative x1, will repeat. No significant EKG changes, Recent negative cardiac catheterization. 2. Hypertension: Essential. Controled. 3. Atrial Fibrillation: Is currently in Sinus Rhythm. Chronically anticoagulated with Xarelto, patient reports complaince. 4. Hyperlipidemia   5. Type II Diabetes   6. Medical Noncompliance: patient did not follow up in office since cardiac catheterization 10/12        RECOMMENDATIONS:   1. Will repeat troponin  2. If troponin repeat is negative patient may be discharged from cardiology standpoint     Please call with any questions, concerns, or change is patient status. I have reviewed my findings and recommendations with patient  Discussed with Dr. Elisa Clark     Thank you for the consult! Electronically signed by RICK Pantoja CNP on 11/30/2018 at 1:59 PM  I have discussed the care of patient including pertinent history and exam and reviewed chart, vitals, labs and radiologic studies. I also participated in medical decision making with Lisa Tovar CNP on the date of service and I agree with all of the pertinent clinical information, assessment and treatment plan and status of the problem list as documented and have edited it. NOTE: This report was transcribed using voice recognition software.  Every effort was made to ensure accuracy; however, inadvertent computerized transcription errors may be present

## 2019-01-19 ENCOUNTER — HOSPITAL ENCOUNTER (EMERGENCY)
Age: 74
Discharge: HOME | End: 2019-01-19
Attending: EMERGENCY MEDICINE
Payer: MEDICARE

## 2019-01-19 ENCOUNTER — APPOINTMENT (OUTPATIENT)
Dept: GENERAL RADIOLOGY | Age: 74
End: 2019-01-19
Payer: MEDICARE

## 2019-01-19 VITALS
OXYGEN SATURATION: 95 % | DIASTOLIC BLOOD PRESSURE: 77 MMHG | WEIGHT: 260 LBS | HEART RATE: 86 BPM | TEMPERATURE: 98.4 F | HEIGHT: 72 IN | BODY MASS INDEX: 35.21 KG/M2 | SYSTOLIC BLOOD PRESSURE: 142 MMHG | RESPIRATION RATE: 16 BRPM

## 2019-01-19 DIAGNOSIS — J40 BRONCHITIS: Primary | ICD-10-CM

## 2019-01-19 LAB
ALBUMIN SERPL-MCNC: 4.1 G/DL (ref 3.5–5.2)
ALP BLD-CCNC: 56 U/L (ref 40–129)
ALT SERPL-CCNC: 28 U/L (ref 0–40)
ANION GAP SERPL CALCULATED.3IONS-SCNC: 13 MMOL/L (ref 7–16)
AST SERPL-CCNC: 20 U/L (ref 0–39)
BILIRUB SERPL-MCNC: 0.5 MG/DL (ref 0–1.2)
BUN BLDV-MCNC: 18 MG/DL (ref 8–23)
CALCIUM SERPL-MCNC: 9.6 MG/DL (ref 8.6–10.2)
CHLORIDE BLD-SCNC: 100 MMOL/L (ref 98–107)
CO2: 26 MMOL/L (ref 22–29)
CREAT SERPL-MCNC: 1.5 MG/DL (ref 0.7–1.2)
GFR AFRICAN AMERICAN: 55
GFR NON-AFRICAN AMERICAN: 46 ML/MIN/1.73
GLUCOSE BLD-MCNC: 193 MG/DL (ref 74–99)
INFLUENZA A BY PCR: NOT DETECTED
INFLUENZA B BY PCR: NOT DETECTED
LACTIC ACID: 2.5 MMOL/L (ref 0.5–2.2)
POTASSIUM SERPL-SCNC: 4.6 MMOL/L (ref 3.5–5)
SODIUM BLD-SCNC: 139 MMOL/L (ref 132–146)
TOTAL PROTEIN: 6.8 G/DL (ref 6.4–8.3)

## 2019-01-19 PROCEDURE — 36415 COLL VENOUS BLD VENIPUNCTURE: CPT

## 2019-01-19 PROCEDURE — 86140 C-REACTIVE PROTEIN: CPT

## 2019-01-19 PROCEDURE — 6370000000 HC RX 637 (ALT 250 FOR IP)

## 2019-01-19 PROCEDURE — 2580000003 HC RX 258: Performed by: EMERGENCY MEDICINE

## 2019-01-19 PROCEDURE — 80053 COMPREHEN METABOLIC PANEL: CPT

## 2019-01-19 PROCEDURE — 6360000002 HC RX W HCPCS: Performed by: EMERGENCY MEDICINE

## 2019-01-19 PROCEDURE — 83605 ASSAY OF LACTIC ACID: CPT

## 2019-01-19 PROCEDURE — 87502 INFLUENZA DNA AMP PROBE: CPT

## 2019-01-19 PROCEDURE — 87040 BLOOD CULTURE FOR BACTERIA: CPT

## 2019-01-19 PROCEDURE — 6370000000 HC RX 637 (ALT 250 FOR IP): Performed by: EMERGENCY MEDICINE

## 2019-01-19 PROCEDURE — 85025 COMPLETE CBC W/AUTO DIFF WBC: CPT

## 2019-01-19 PROCEDURE — 71046 X-RAY EXAM CHEST 2 VIEWS: CPT

## 2019-01-19 PROCEDURE — 99283 EMERGENCY DEPT VISIT LOW MDM: CPT

## 2019-01-19 PROCEDURE — 96374 THER/PROPH/DIAG INJ IV PUSH: CPT

## 2019-01-19 RX ORDER — METHYLPREDNISOLONE SODIUM SUCCINATE 125 MG/2ML
125 INJECTION, POWDER, LYOPHILIZED, FOR SOLUTION INTRAMUSCULAR; INTRAVENOUS ONCE
Status: COMPLETED | OUTPATIENT
Start: 2019-01-19 | End: 2019-01-19

## 2019-01-19 RX ORDER — 0.9 % SODIUM CHLORIDE 0.9 %
1000 INTRAVENOUS SOLUTION INTRAVENOUS ONCE
Status: COMPLETED | OUTPATIENT
Start: 2019-01-19 | End: 2019-01-19

## 2019-01-19 RX ORDER — AZITHROMYCIN 250 MG/1
250 TABLET, FILM COATED ORAL DAILY
Qty: 1 PACKET | Refills: 0 | Status: SHIPPED | OUTPATIENT
Start: 2019-01-19 | End: 2019-01-29

## 2019-01-19 RX ORDER — IPRATROPIUM BROMIDE AND ALBUTEROL SULFATE 2.5; .5 MG/3ML; MG/3ML
1 SOLUTION RESPIRATORY (INHALATION)
Status: DISCONTINUED | OUTPATIENT
Start: 2019-01-19 | End: 2019-01-20 | Stop reason: HOSPADM

## 2019-01-19 RX ORDER — AZITHROMYCIN 250 MG/1
TABLET, FILM COATED ORAL
Status: COMPLETED
Start: 2019-01-19 | End: 2019-01-19

## 2019-01-19 RX ORDER — METHYLPREDNISOLONE 4 MG/1
TABLET ORAL
Qty: 1 KIT | Refills: 0 | Status: SHIPPED | OUTPATIENT
Start: 2019-01-19 | End: 2019-01-25

## 2019-01-19 RX ADMIN — IPRATROPIUM BROMIDE AND ALBUTEROL SULFATE 1 AMPULE: .5; 3 SOLUTION RESPIRATORY (INHALATION) at 21:57

## 2019-01-19 RX ADMIN — SODIUM CHLORIDE 1000 ML: 9 INJECTION, SOLUTION INTRAVENOUS at 20:48

## 2019-01-19 RX ADMIN — AZITHROMYCIN 500 MG: 250 TABLET, FILM COATED ORAL at 22:37

## 2019-01-19 RX ADMIN — IPRATROPIUM BROMIDE AND ALBUTEROL SULFATE 1 AMPULE: .5; 3 SOLUTION RESPIRATORY (INHALATION) at 20:43

## 2019-01-19 RX ADMIN — METHYLPREDNISOLONE SODIUM SUCCINATE 125 MG: 125 INJECTION, POWDER, FOR SOLUTION INTRAMUSCULAR; INTRAVENOUS at 22:11

## 2019-01-19 ASSESSMENT — PAIN DESCRIPTION - LOCATION: LOCATION: CHEST

## 2019-01-19 ASSESSMENT — PAIN DESCRIPTION - FREQUENCY: FREQUENCY: CONTINUOUS

## 2019-01-19 ASSESSMENT — PAIN DESCRIPTION - PAIN TYPE: TYPE: ACUTE PAIN

## 2019-01-19 ASSESSMENT — PAIN DESCRIPTION - DESCRIPTORS: DESCRIPTORS: PRESSURE

## 2019-01-19 ASSESSMENT — PAIN SCALES - GENERAL: PAINLEVEL_OUTOF10: 8

## 2019-01-20 LAB
BASOPHILS ABSOLUTE: 0.02 E9/L (ref 0–0.2)
BASOPHILS RELATIVE PERCENT: 0.3 % (ref 0–2)
C-REACTIVE PROTEIN: 0.4 MG/DL (ref 0–0.4)
EOSINOPHILS ABSOLUTE: 0.14 E9/L (ref 0.05–0.5)
EOSINOPHILS RELATIVE PERCENT: 2.1 % (ref 0–6)
HCT VFR BLD CALC: 42.9 % (ref 37–54)
HEMOGLOBIN: 14.3 G/DL (ref 12.5–16.5)
IMMATURE GRANULOCYTES #: 0.03 E9/L
IMMATURE GRANULOCYTES %: 0.4 % (ref 0–5)
LYMPHOCYTES ABSOLUTE: 2.73 E9/L (ref 1.5–4)
LYMPHOCYTES RELATIVE PERCENT: 40.2 % (ref 20–42)
MCH RBC QN AUTO: 32.9 PG (ref 26–35)
MCHC RBC AUTO-ENTMCNC: 33.3 % (ref 32–34.5)
MCV RBC AUTO: 98.6 FL (ref 80–99.9)
MONOCYTES ABSOLUTE: 0.6 E9/L (ref 0.1–0.95)
MONOCYTES RELATIVE PERCENT: 8.8 % (ref 2–12)
NEUTROPHILS ABSOLUTE: 3.27 E9/L (ref 1.8–7.3)
NEUTROPHILS RELATIVE PERCENT: 48.2 % (ref 43–80)
PDW BLD-RTO: 12.5 FL (ref 11.5–15)
PLATELET # BLD: 72 E9/L (ref 130–450)
PLATELET CONFIRMATION: NORMAL
PMV BLD AUTO: 9.7 FL (ref 7–12)
RBC # BLD: 4.35 E12/L (ref 3.8–5.8)
RBC # BLD: NORMAL 10*6/UL
WBC # BLD: 6.8 E9/L (ref 4.5–11.5)

## 2019-01-25 LAB
BLOOD CULTURE, ROUTINE: NORMAL
CULTURE, BLOOD 2: NORMAL

## 2019-04-22 ENCOUNTER — HOSPITAL ENCOUNTER (EMERGENCY)
Age: 74
Discharge: HOME | End: 2019-04-22
Payer: MEDICARE

## 2019-04-22 ENCOUNTER — APPOINTMENT (OUTPATIENT)
Dept: GENERAL RADIOLOGY | Age: 74
End: 2019-04-22
Payer: MEDICARE

## 2019-04-22 VITALS
RESPIRATION RATE: 16 BRPM | TEMPERATURE: 98.7 F | DIASTOLIC BLOOD PRESSURE: 80 MMHG | SYSTOLIC BLOOD PRESSURE: 160 MMHG | WEIGHT: 267 LBS | OXYGEN SATURATION: 96 % | HEIGHT: 72 IN | BODY MASS INDEX: 36.16 KG/M2 | HEART RATE: 82 BPM

## 2019-04-22 DIAGNOSIS — J02.9 ACUTE PHARYNGITIS, UNSPECIFIED ETIOLOGY: Primary | ICD-10-CM

## 2019-04-22 DIAGNOSIS — J06.9 URI WITH COUGH AND CONGESTION: ICD-10-CM

## 2019-04-22 DIAGNOSIS — S46.911A STRAIN OF RIGHT SHOULDER, INITIAL ENCOUNTER: ICD-10-CM

## 2019-04-22 LAB — STREP GRP A PCR: NEGATIVE

## 2019-04-22 PROCEDURE — 87880 STREP A ASSAY W/OPTIC: CPT

## 2019-04-22 PROCEDURE — 73030 X-RAY EXAM OF SHOULDER: CPT

## 2019-04-22 PROCEDURE — 99283 EMERGENCY DEPT VISIT LOW MDM: CPT

## 2019-04-22 PROCEDURE — 6370000000 HC RX 637 (ALT 250 FOR IP)

## 2019-04-22 RX ORDER — ALBUTEROL SULFATE 90 UG/1
2 AEROSOL, METERED RESPIRATORY (INHALATION) EVERY 6 HOURS PRN
Qty: 1 INHALER | Refills: 0 | Status: SHIPPED | OUTPATIENT
Start: 2019-04-22 | End: 2019-05-07 | Stop reason: ALTCHOICE

## 2019-04-22 RX ORDER — IBUPROFEN 800 MG/1
TABLET ORAL
Status: COMPLETED
Start: 2019-04-22 | End: 2019-04-22

## 2019-04-22 RX ORDER — IBUPROFEN 800 MG/1
800 TABLET ORAL ONCE
Status: DISCONTINUED | OUTPATIENT
Start: 2019-04-22 | End: 2019-04-22 | Stop reason: HOSPADM

## 2019-04-22 RX ORDER — MELOXICAM 15 MG/1
15 TABLET ORAL DAILY
Qty: 30 TABLET | Refills: 0 | Status: SHIPPED | OUTPATIENT
Start: 2019-04-22

## 2019-04-22 RX ORDER — DEXTROMETHORPHAN HYDROBROMIDE AND PROMETHAZINE HYDROCHLORIDE 15; 6.25 MG/5ML; MG/5ML
5 SYRUP ORAL 4 TIMES DAILY PRN
Qty: 240 ML | Refills: 1 | Status: SHIPPED | OUTPATIENT
Start: 2019-04-22 | End: 2019-05-07 | Stop reason: ALTCHOICE

## 2019-04-22 RX ORDER — DOXYCYCLINE HYCLATE 100 MG
100 TABLET ORAL 2 TIMES DAILY
Qty: 20 TABLET | Refills: 0 | Status: SHIPPED | OUTPATIENT
Start: 2019-04-22 | End: 2019-05-02

## 2019-04-22 RX ADMIN — IBUPROFEN: 800 TABLET, FILM COATED ORAL at 20:09

## 2019-04-22 ASSESSMENT — PAIN DESCRIPTION - ORIENTATION: ORIENTATION: RIGHT

## 2019-04-22 ASSESSMENT — PAIN DESCRIPTION - PROGRESSION: CLINICAL_PROGRESSION: NOT CHANGED

## 2019-04-22 ASSESSMENT — PAIN DESCRIPTION - PAIN TYPE: TYPE: ACUTE PAIN

## 2019-04-22 ASSESSMENT — PAIN SCALES - GENERAL
PAINLEVEL_OUTOF10: 8
PAINLEVEL_OUTOF10: 5

## 2019-04-22 ASSESSMENT — PAIN DESCRIPTION - LOCATION: LOCATION: SHOULDER

## 2019-04-22 ASSESSMENT — PAIN DESCRIPTION - ONSET: ONSET: ON-GOING

## 2019-04-22 ASSESSMENT — PAIN DESCRIPTION - FREQUENCY: FREQUENCY: CONTINUOUS

## 2019-04-22 ASSESSMENT — PAIN DESCRIPTION - DESCRIPTORS: DESCRIPTORS: ACHING

## 2019-04-22 NOTE — ED PROVIDER NOTES
[facility]     Department of Emergency Medicine   ED  Provider Note  Admit Date/RoomTime: 4/22/2019  6:49 PM  ED Room: GILDARDO/GILDARDO    Chief Complaint:   Pharyngitis and Shoulder Pain (right shoulder pain no known injury)    History of Present Illness      Narciso Felix is a 74 y.o. old male who presents to the ED for sore throat and right shoulder pain. Patient states his throat began to hurt this afternoon. He denies difficulty with breathing or swallowing but does have pain. Patient has no chest pain or SOB. He denies abdominal pain, nausea, vomiting, diarrhea, limb swelling, neck pain, numbness/tingling, fever/chills, dizziness, headache, or recent trauma/injury/fall. Patient does have mild cough an congestion as well. He states his right shoulder began to hurt over the past day. He states the pain is worse with movement of his shoulder. Patient is alert and oriented x3 and in no apparent distress at this exam. Patient is nontoxic appearing. ROS   Pertinent positives and negatives are stated within HPI, all other systems reviewed and are negative. Past Medical History:  has a past medical history of Arthritis, Atrial fibrillation ([facility] [address] Diabetes mellitus type 2, noninsulin dependent ([facility] [address] Dyspnea, History of cardiovascular stress test, History of echocardiogram, Hyperlipidemia, Hypertension, and Preoperative clearance. Past Surgical History:  has a past surgical history that includes Cholecystectomy; cardiovascular stress test (08/11); Diagnostic Cardiac Cath Lab Procedure (11/8/2007); Diagnostic Cardiac Cath Lab Procedure (4/26/2010); Cardiac catheterization (04/26/2012); and Cardiac catheterization (10/12/2018). Social History:  reports that he quit smoking about 24 years ago. His smoking use included cigarettes. He has a 12.50 pack-year smoking history. He has never used smokeless tobacco. He reports that he does not drink alcohol or use drugs.     Family History: family history includes Cancer in his mother; Heart Disease in his brother; Mental Illness in his sister. The patients home medications have been reviewed. Allergies: Dye [iodides]  Allergies have been reviewed with patient. Physical Exam   VS:  BP (!) 160/80   Pulse 82   Temp 98.7 °F (37.1 °C) (Oral)   Resp 16   Ht 6' (1.829 m)   Wt 267 lb (121.1 kg)   SpO2 96%   BMI 36.21 kg/m²      Oxygen Saturation Interpretation: Normal.    Constitutional:  Alert, development consistent with age. NAD  Eyes: EOMI, non-injected conjunctiva   Ears:  External Ears: Bilateral normal.              TM's & External Canals: mild redness bilaterally, right TM slightly red with mild effusion  Throat: moderate erythema, uvula midline, Airway patent     Neck/Lymphatic: Supple. There is no cervical node tenderness. No meningeal signs   Respiratory:  Clear to auscultation and breath sounds equal, good air flow. No respiratory distress  CV: Regular rate and rhythm  Extremities: Pain with palpation to right proximal humerus, pain with movement of shoulder, intact sensations distally, full ROM of elbow and wrist, no bruising or deformity   Integument:  No rashes or erythema present. Neurological:  Motor functions intact. Lab / Imaging Results   (All laboratory and radiology results have been personally reviewed by myself)  Labs:  Results for orders placed or performed during the hospital encounter of 04/22/19   Strep Screen Group A Throat   Result Value Ref Range    Strep Grp A PCR Negative Negative     Imaging: All Radiology results interpreted by Radiologist unless otherwise noted. XR SHOULDER RIGHT (MIN 2 VIEWS)   Final Result      No evidence of fracture or dislocation of the shoulder.                                 ED Course / Medical Decision Making   ED Medications:   Medications   ibuprofen (ADVIL;MOTRIN) 800 MG tablet (  Given 4/22/19 2009)     Consults:  None    Procedures:  none    Medical Decision Making:   Patient is well appearing, non was transcribed using voice recognition software. Every effort was made to ensure accuracy; however, inadvertent computerized transcription errors may be present.     END OF ED PROVIDER NOTE        Fanny Peguero PA-C  04/25/19 1990

## 2019-05-07 ENCOUNTER — APPOINTMENT (OUTPATIENT)
Dept: CT IMAGING | Age: 74
End: 2019-05-07
Payer: MEDICARE

## 2019-05-07 ENCOUNTER — APPOINTMENT (OUTPATIENT)
Dept: GENERAL RADIOLOGY | Age: 74
End: 2019-05-07
Payer: MEDICARE

## 2019-05-07 ENCOUNTER — HOSPITAL ENCOUNTER (OUTPATIENT)
Age: 74
Setting detail: OBSERVATION
Discharge: HOME | End: 2019-05-09
Attending: EMERGENCY MEDICINE | Admitting: INTERNAL MEDICINE
Payer: MEDICARE

## 2019-05-07 DIAGNOSIS — Z79.4 TYPE 2 DIABETES MELLITUS WITH HYPERGLYCEMIA, WITH LONG-TERM CURRENT USE OF INSULIN (HCC): ICD-10-CM

## 2019-05-07 DIAGNOSIS — E11.65 TYPE 2 DIABETES MELLITUS WITH HYPERGLYCEMIA, WITH LONG-TERM CURRENT USE OF INSULIN (HCC): ICD-10-CM

## 2019-05-07 DIAGNOSIS — R06.09 DYSPNEA ON EXERTION: ICD-10-CM

## 2019-05-07 DIAGNOSIS — R07.9 EXERTIONAL CHEST PAIN: Primary | ICD-10-CM

## 2019-05-07 DIAGNOSIS — N18.9 CHRONIC RENAL INSUFFICIENCY, UNSPECIFIED STAGE: ICD-10-CM

## 2019-05-07 PROBLEM — R06.00 DYSPNEA: Status: ACTIVE | Noted: 2019-05-07

## 2019-05-07 LAB
ALBUMIN SERPL-MCNC: 4.1 G/DL (ref 3.5–5.2)
ALP BLD-CCNC: 49 U/L (ref 40–129)
ALT SERPL-CCNC: 30 U/L (ref 0–40)
ANION GAP SERPL CALCULATED.3IONS-SCNC: 12 MMOL/L (ref 7–16)
AST SERPL-CCNC: 26 U/L (ref 0–39)
BASOPHILS ABSOLUTE: 0.02 E9/L (ref 0–0.2)
BASOPHILS RELATIVE PERCENT: 0.3 % (ref 0–2)
BILIRUB SERPL-MCNC: 0.4 MG/DL (ref 0–1.2)
BUN BLDV-MCNC: 19 MG/DL (ref 8–23)
CALCIUM SERPL-MCNC: 9.3 MG/DL (ref 8.6–10.2)
CHLORIDE BLD-SCNC: 99 MMOL/L (ref 98–107)
CO2: 27 MMOL/L (ref 22–29)
CREAT SERPL-MCNC: 1.3 MG/DL (ref 0.7–1.2)
D DIMER: <200 NG/ML DDU
EKG ATRIAL RATE: 67 BPM
EKG ATRIAL RATE: 79 BPM
EKG P AXIS: 41 DEGREES
EKG P AXIS: 52 DEGREES
EKG P-R INTERVAL: 224 MS
EKG P-R INTERVAL: 236 MS
EKG Q-T INTERVAL: 406 MS
EKG Q-T INTERVAL: 438 MS
EKG QRS DURATION: 82 MS
EKG QRS DURATION: 86 MS
EKG QTC CALCULATION (BAZETT): 462 MS
EKG QTC CALCULATION (BAZETT): 465 MS
EKG R AXIS: -16 DEGREES
EKG R AXIS: -17 DEGREES
EKG T AXIS: 74 DEGREES
EKG T AXIS: 85 DEGREES
EKG VENTRICULAR RATE: 67 BPM
EKG VENTRICULAR RATE: 79 BPM
EOSINOPHILS ABSOLUTE: 0.14 E9/L (ref 0.05–0.5)
EOSINOPHILS RELATIVE PERCENT: 1.9 % (ref 0–6)
GFR AFRICAN AMERICAN: >60
GFR NON-AFRICAN AMERICAN: 54 ML/MIN/1.73
GLUCOSE BLD-MCNC: 235 MG/DL (ref 74–99)
HCT VFR BLD CALC: 41.2 % (ref 37–54)
HEMOGLOBIN: 14.1 G/DL (ref 12.5–16.5)
IMMATURE GRANULOCYTES #: 0.04 E9/L
IMMATURE GRANULOCYTES %: 0.6 % (ref 0–5)
LYMPHOCYTES ABSOLUTE: 2.87 E9/L (ref 1.5–4)
LYMPHOCYTES RELATIVE PERCENT: 39.5 % (ref 20–42)
MCH RBC QN AUTO: 32.9 PG (ref 26–35)
MCHC RBC AUTO-ENTMCNC: 34.2 % (ref 32–34.5)
MCV RBC AUTO: 96.3 FL (ref 80–99.9)
METER GLUCOSE: 240 MG/DL (ref 74–99)
METER GLUCOSE: 66 MG/DL (ref 74–99)
MONOCYTES ABSOLUTE: 0.4 E9/L (ref 0.1–0.95)
MONOCYTES RELATIVE PERCENT: 5.5 % (ref 2–12)
NEUTROPHILS ABSOLUTE: 3.8 E9/L (ref 1.8–7.3)
NEUTROPHILS RELATIVE PERCENT: 52.2 % (ref 43–80)
PDW BLD-RTO: 12.8 FL (ref 11.5–15)
PLATELET # BLD: 80 E9/L (ref 130–450)
PLATELET CONFIRMATION: NORMAL
PMV BLD AUTO: 9.6 FL (ref 7–12)
POTASSIUM REFLEX MAGNESIUM: 4.5 MMOL/L (ref 3.5–5)
PRO-BNP: 103 PG/ML (ref 0–450)
RBC # BLD: 4.28 E12/L (ref 3.8–5.8)
SODIUM BLD-SCNC: 138 MMOL/L (ref 132–146)
TOTAL PROTEIN: 6.5 G/DL (ref 6.4–8.3)
TROPONIN: <0.01 NG/ML (ref 0–0.03)
WBC # BLD: 7.3 E9/L (ref 4.5–11.5)

## 2019-05-07 PROCEDURE — 36415 COLL VENOUS BLD VENIPUNCTURE: CPT

## 2019-05-07 PROCEDURE — 6370000000 HC RX 637 (ALT 250 FOR IP): Performed by: NURSE PRACTITIONER

## 2019-05-07 PROCEDURE — 96375 TX/PRO/DX INJ NEW DRUG ADDON: CPT

## 2019-05-07 PROCEDURE — 82962 GLUCOSE BLOOD TEST: CPT

## 2019-05-07 PROCEDURE — 84484 ASSAY OF TROPONIN QUANT: CPT

## 2019-05-07 PROCEDURE — 93005 ELECTROCARDIOGRAM TRACING: CPT | Performed by: NURSE PRACTITIONER

## 2019-05-07 PROCEDURE — 70450 CT HEAD/BRAIN W/O DYE: CPT

## 2019-05-07 PROCEDURE — G0378 HOSPITAL OBSERVATION PER HR: HCPCS

## 2019-05-07 PROCEDURE — 70490 CT SOFT TISSUE NECK W/O DYE: CPT

## 2019-05-07 PROCEDURE — 71046 X-RAY EXAM CHEST 2 VIEWS: CPT

## 2019-05-07 PROCEDURE — 6360000002 HC RX W HCPCS: Performed by: STUDENT IN AN ORGANIZED HEALTH CARE EDUCATION/TRAINING PROGRAM

## 2019-05-07 PROCEDURE — 96365 THER/PROPH/DIAG IV INF INIT: CPT

## 2019-05-07 PROCEDURE — 99285 EMERGENCY DEPT VISIT HI MDM: CPT

## 2019-05-07 PROCEDURE — 85025 COMPLETE CBC W/AUTO DIFF WBC: CPT

## 2019-05-07 PROCEDURE — 93010 ELECTROCARDIOGRAM REPORT: CPT | Performed by: INTERNAL MEDICINE

## 2019-05-07 PROCEDURE — 2500000003 HC RX 250 WO HCPCS: Performed by: STUDENT IN AN ORGANIZED HEALTH CARE EDUCATION/TRAINING PROGRAM

## 2019-05-07 PROCEDURE — 80053 COMPREHEN METABOLIC PANEL: CPT

## 2019-05-07 PROCEDURE — 83880 ASSAY OF NATRIURETIC PEPTIDE: CPT

## 2019-05-07 PROCEDURE — 6370000000 HC RX 637 (ALT 250 FOR IP): Performed by: INTERNAL MEDICINE

## 2019-05-07 PROCEDURE — 85378 FIBRIN DEGRADE SEMIQUANT: CPT

## 2019-05-07 PROCEDURE — 2580000003 HC RX 258: Performed by: INTERNAL MEDICINE

## 2019-05-07 RX ORDER — ALBUTEROL SULFATE 90 UG/1
2 AEROSOL, METERED RESPIRATORY (INHALATION) EVERY 6 HOURS PRN
Status: DISCONTINUED | OUTPATIENT
Start: 2019-05-07 | End: 2019-05-09 | Stop reason: HOSPADM

## 2019-05-07 RX ORDER — DEXAMETHASONE SODIUM PHOSPHATE 4 MG/ML
4 INJECTION, SOLUTION INTRA-ARTICULAR; INTRALESIONAL; INTRAMUSCULAR; INTRAVENOUS; SOFT TISSUE EVERY 8 HOURS
Status: DISCONTINUED | OUTPATIENT
Start: 2019-05-07 | End: 2019-05-07

## 2019-05-07 RX ORDER — PANTOPRAZOLE SODIUM 40 MG/1
40 TABLET, DELAYED RELEASE ORAL DAILY
Status: DISCONTINUED | OUTPATIENT
Start: 2019-05-08 | End: 2019-05-09 | Stop reason: HOSPADM

## 2019-05-07 RX ORDER — INSULIN GLARGINE 100 [IU]/ML
36 INJECTION, SOLUTION SUBCUTANEOUS NIGHTLY
Status: DISCONTINUED | OUTPATIENT
Start: 2019-05-07 | End: 2019-05-09 | Stop reason: HOSPADM

## 2019-05-07 RX ORDER — DEXTROSE MONOHYDRATE 50 MG/ML
100 INJECTION, SOLUTION INTRAVENOUS PRN
Status: DISCONTINUED | OUTPATIENT
Start: 2019-05-07 | End: 2019-05-09 | Stop reason: HOSPADM

## 2019-05-07 RX ORDER — ACETAMINOPHEN 500 MG
500 TABLET ORAL EVERY 6 HOURS PRN
Status: DISCONTINUED | OUTPATIENT
Start: 2019-05-07 | End: 2019-05-07 | Stop reason: SDUPTHER

## 2019-05-07 RX ORDER — TAMSULOSIN HYDROCHLORIDE 0.4 MG/1
0.4 CAPSULE ORAL NIGHTLY
Status: DISCONTINUED | OUTPATIENT
Start: 2019-05-07 | End: 2019-05-09 | Stop reason: HOSPADM

## 2019-05-07 RX ORDER — CLINDAMYCIN PHOSPHATE 600 MG/50ML
600 INJECTION INTRAVENOUS EVERY 8 HOURS
Status: COMPLETED | OUTPATIENT
Start: 2019-05-07 | End: 2019-05-08

## 2019-05-07 RX ORDER — ACETAMINOPHEN 500 MG
1000 TABLET ORAL ONCE
Status: COMPLETED | OUTPATIENT
Start: 2019-05-07 | End: 2019-05-07

## 2019-05-07 RX ORDER — DEXTROSE MONOHYDRATE 25 G/50ML
12.5 INJECTION, SOLUTION INTRAVENOUS PRN
Status: DISCONTINUED | OUTPATIENT
Start: 2019-05-07 | End: 2019-05-09 | Stop reason: HOSPADM

## 2019-05-07 RX ORDER — SODIUM CHLORIDE 0.9 % (FLUSH) 0.9 %
10 SYRINGE (ML) INJECTION EVERY 12 HOURS SCHEDULED
Status: DISCONTINUED | OUTPATIENT
Start: 2019-05-07 | End: 2019-05-09 | Stop reason: HOSPADM

## 2019-05-07 RX ORDER — FLUTICASONE PROPIONATE 50 MCG
1 SPRAY, SUSPENSION (ML) NASAL 2 TIMES DAILY
Status: DISCONTINUED | OUTPATIENT
Start: 2019-05-07 | End: 2019-05-09 | Stop reason: HOSPADM

## 2019-05-07 RX ORDER — FUROSEMIDE 20 MG/1
20 TABLET ORAL DAILY
Status: DISCONTINUED | OUTPATIENT
Start: 2019-05-08 | End: 2019-05-09 | Stop reason: HOSPADM

## 2019-05-07 RX ORDER — METOPROLOL SUCCINATE 25 MG/1
25 TABLET, EXTENDED RELEASE ORAL NIGHTLY
Status: DISCONTINUED | OUTPATIENT
Start: 2019-05-07 | End: 2019-05-08

## 2019-05-07 RX ORDER — POLYETHYLENE GLYCOL 3350 17 G/17G
17 POWDER, FOR SOLUTION ORAL DAILY
Status: DISCONTINUED | OUTPATIENT
Start: 2019-05-08 | End: 2019-05-09 | Stop reason: HOSPADM

## 2019-05-07 RX ORDER — M-VIT,TX,IRON,MINS/CALC/FOLIC 27MG-0.4MG
1 TABLET ORAL NIGHTLY
Status: DISCONTINUED | OUTPATIENT
Start: 2019-05-07 | End: 2019-05-09 | Stop reason: HOSPADM

## 2019-05-07 RX ORDER — ALBUTEROL SULFATE 2.5 MG/3ML
2.5 SOLUTION RESPIRATORY (INHALATION) EVERY 6 HOURS PRN
Status: DISCONTINUED | OUTPATIENT
Start: 2019-05-07 | End: 2019-05-09 | Stop reason: HOSPADM

## 2019-05-07 RX ORDER — ASPIRIN 81 MG/1
243 TABLET, CHEWABLE ORAL ONCE
Status: COMPLETED | OUTPATIENT
Start: 2019-05-07 | End: 2019-05-07

## 2019-05-07 RX ORDER — LISINOPRIL 20 MG/1
20 TABLET ORAL DAILY
Status: DISCONTINUED | OUTPATIENT
Start: 2019-05-07 | End: 2019-05-09 | Stop reason: HOSPADM

## 2019-05-07 RX ORDER — ALBUTEROL SULFATE 90 UG/1
2 AEROSOL, METERED RESPIRATORY (INHALATION) EVERY 6 HOURS PRN
COMMUNITY

## 2019-05-07 RX ORDER — PRAVASTATIN SODIUM 20 MG
20 TABLET ORAL NIGHTLY
Status: DISCONTINUED | OUTPATIENT
Start: 2019-05-07 | End: 2019-05-09 | Stop reason: HOSPADM

## 2019-05-07 RX ORDER — ASPIRIN 81 MG/1
81 TABLET ORAL NIGHTLY
Status: DISCONTINUED | OUTPATIENT
Start: 2019-05-07 | End: 2019-05-09 | Stop reason: HOSPADM

## 2019-05-07 RX ORDER — ACETAMINOPHEN 325 MG/1
650 TABLET ORAL EVERY 4 HOURS PRN
Status: DISCONTINUED | OUTPATIENT
Start: 2019-05-07 | End: 2019-05-09 | Stop reason: HOSPADM

## 2019-05-07 RX ORDER — NICOTINE POLACRILEX 4 MG
15 LOZENGE BUCCAL PRN
Status: DISCONTINUED | OUTPATIENT
Start: 2019-05-07 | End: 2019-05-09 | Stop reason: HOSPADM

## 2019-05-07 RX ORDER — ALBUTEROL SULFATE 2.5 MG/3ML
2.5 SOLUTION RESPIRATORY (INHALATION) EVERY 6 HOURS PRN
COMMUNITY

## 2019-05-07 RX ORDER — AMIODARONE HYDROCHLORIDE 200 MG/1
200 TABLET ORAL DAILY
Status: DISCONTINUED | OUTPATIENT
Start: 2019-05-08 | End: 2019-05-09 | Stop reason: HOSPADM

## 2019-05-07 RX ORDER — CETIRIZINE HYDROCHLORIDE 10 MG/1
10 TABLET ORAL DAILY
Status: DISCONTINUED | OUTPATIENT
Start: 2019-05-08 | End: 2019-05-09 | Stop reason: HOSPADM

## 2019-05-07 RX ORDER — MELOXICAM 7.5 MG/1
15 TABLET ORAL DAILY
Status: DISCONTINUED | OUTPATIENT
Start: 2019-05-08 | End: 2019-05-09 | Stop reason: HOSPADM

## 2019-05-07 RX ORDER — DEXAMETHASONE SODIUM PHOSPHATE 10 MG/ML
6 INJECTION INTRAMUSCULAR; INTRAVENOUS EVERY 8 HOURS
Status: COMPLETED | OUTPATIENT
Start: 2019-05-07 | End: 2019-05-08

## 2019-05-07 RX ORDER — SODIUM CHLORIDE 9 MG/ML
INJECTION, SOLUTION INTRAVENOUS ONCE
Status: COMPLETED | OUTPATIENT
Start: 2019-05-07 | End: 2019-05-07

## 2019-05-07 RX ORDER — GLIPIZIDE 5 MG/1
10 TABLET ORAL DAILY
Status: DISCONTINUED | OUTPATIENT
Start: 2019-05-08 | End: 2019-05-09 | Stop reason: HOSPADM

## 2019-05-07 RX ORDER — SODIUM CHLORIDE 0.9 % (FLUSH) 0.9 %
10 SYRINGE (ML) INJECTION PRN
Status: DISCONTINUED | OUTPATIENT
Start: 2019-05-07 | End: 2019-05-09 | Stop reason: HOSPADM

## 2019-05-07 RX ADMIN — LISINOPRIL 20 MG: 20 TABLET ORAL at 17:24

## 2019-05-07 RX ADMIN — METOPROLOL SUCCINATE 25 MG: 25 TABLET, EXTENDED RELEASE ORAL at 20:51

## 2019-05-07 RX ADMIN — SODIUM CHLORIDE: 9 INJECTION, SOLUTION INTRAVENOUS at 19:00

## 2019-05-07 RX ADMIN — TAMSULOSIN HYDROCHLORIDE 0.4 MG: 0.4 CAPSULE ORAL at 20:52

## 2019-05-07 RX ADMIN — FLUTICASONE PROPIONATE 1 SPRAY: 50 SPRAY, METERED NASAL at 20:51

## 2019-05-07 RX ADMIN — ASPIRIN 81 MG 243 MG: 81 TABLET ORAL at 14:12

## 2019-05-07 RX ADMIN — METFORMIN HYDROCHLORIDE 1000 MG: 1000 TABLET ORAL at 17:24

## 2019-05-07 RX ADMIN — INSULIN LISPRO 2 UNITS: 100 INJECTION, SOLUTION INTRAVENOUS; SUBCUTANEOUS at 20:52

## 2019-05-07 RX ADMIN — INSULIN GLARGINE 36 UNITS: 100 INJECTION, SOLUTION SUBCUTANEOUS at 20:52

## 2019-05-07 RX ADMIN — ASPIRIN 81 MG: 81 TABLET, COATED ORAL at 20:52

## 2019-05-07 RX ADMIN — ACETAMINOPHEN 1000 MG: 500 TABLET, FILM COATED ORAL at 14:11

## 2019-05-07 RX ADMIN — CLINDAMYCIN PHOSPHATE 600 MG: 600 INJECTION, SOLUTION INTRAVENOUS at 22:22

## 2019-05-07 RX ADMIN — DEXAMETHASONE SODIUM PHOSPHATE 6 MG: 10 INJECTION INTRAMUSCULAR; INTRAVENOUS at 22:22

## 2019-05-07 RX ADMIN — MULTIPLE VITAMINS W/ MINERALS TAB 1 TABLET: TAB at 20:51

## 2019-05-07 RX ADMIN — PRAVASTATIN SODIUM 20 MG: 20 TABLET ORAL at 20:51

## 2019-05-07 RX ADMIN — RIVAROXABAN 20 MG: 20 TABLET, FILM COATED ORAL at 17:24

## 2019-05-07 ASSESSMENT — ENCOUNTER SYMPTOMS
SHORTNESS OF BREATH: 1
STRIDOR: 0
NAUSEA: 0
EYE REDNESS: 0
SORE THROAT: 1
TROUBLE SWALLOWING: 1
COUGH: 1
ALLERGIC/IMMUNOLOGIC NEGATIVE: 1
EYE DISCHARGE: 0
COLOR CHANGE: 0
VOICE CHANGE: 1
VOMITING: 0

## 2019-05-07 ASSESSMENT — PAIN DESCRIPTION - LOCATION: LOCATION: THROAT;CHEST

## 2019-05-07 ASSESSMENT — PAIN SCALES - GENERAL
PAINLEVEL_OUTOF10: 4
PAINLEVEL_OUTOF10: 0

## 2019-05-07 NOTE — ED NOTES
Patient ambulated down hallway with nurse at side; SpO2 97-98% with heart rate 82; patient initially experiencing dizziness upon standing from bed; became short of breath upon returning to bed and states that his left arm is \"starting to hurt\"; patient placed on cardiac monitor and pulse oximetry; ED provider notified; daughter at patient bedside.           Wero Josue RN  05/07/19 [id number]

## 2019-05-07 NOTE — CONSULTS
OTOLARYNGOLOGY  CONSULT NOTE  5/7/2019    Physician Consulted: Dr. Ericka Joyner  Reason for Consult: sore throat/dysphagia  Referring Physician: Dr. Ludwig Cruz is a 74 y.o. male who ENT was consulted for evaluation of his throat. Pt and his daughter state that for the past 3-4 weeks he has had a gradually increasing sore throat accompanied by dysphagia/odnyophagia to solids and liquids in addition to dysphonia. He states his voice is more hoarse than it has ever been slowly getting worse. He denies weight loss, fevers, night sweats, or recent infection. He is a diabetic, has GERD (takes PPI daily), heart issues (a fib) and has an anaphylactic response to contrast per daughter. He originally presented to [facility] ED 4/22 with sore throat for which he was given 10 days of doxycycline, a cough suppressant, and viscous lidocaine for his sore throat. Strep test there was negative. He states those did not improve his symptoms. He [facility] today [facility] our ED [facility] and was [facility] of breath and was sent here for evaluation. Family history of cancer in his mother. No ENT surgeries in the past. No personal history of cancer. Former smoker (10+ years ago) at about 1/3 pack a day for 30 years. Review of Systems   Constitutional: Negative for chills, fatigue, fever and unexpected weight change. HENT: Positive for hearing loss, sore throat, tinnitus, trouble swallowing and voice change. Eyes: Negative for discharge and redness. Respiratory: Positive for cough and shortness of breath. Negative for stridor. Gastrointestinal: Negative for nausea and vomiting. Endocrine: Negative. Genitourinary: Negative. Musculoskeletal: Negative. Skin: Negative for color change and rash. Allergic/Immunologic: Negative. Neurological: Negative for dizziness, speech difficulty and headaches. Hematological: Negative.     Psychiatric/Behavioral: Negative for agitation and mouth daily   Yes Historical Provider, MD   lisinopril (PRINIVIL;ZESTRIL) 20 MG tablet Take 20 mg by mouth daily   Yes Historical Provider, MD   tamsulosin (FLOMAX) 0.4 MG capsule Take 0.4 mg by mouth nightly   Yes Historical Provider, MD   amiodarone (CORDARONE) 200 MG tablet Take 200 mg by mouth daily   Yes Historical Provider, MD   aspirin 81 MG EC tablet Take 81 mg by mouth nightly    Yes Historical Provider, MD   budesonide-formoterol (SYMBICORT) 160-4.5 MCG/ACT AERO Inhale 2 puffs into the lungs 2 times daily   Yes Historical Provider, MD   cetirizine (ZYRTEC) 10 MG tablet Take 10 mg by mouth daily   Yes Historical Provider, MD   Cholecalciferol (VITAMIN D) 2000 units CAPS capsule Take 2,000 Units by mouth daily   Yes Historical Provider, MD   Dextrose, Diabetic Use, (GLUCOSE) 1 g CHEW Take 1 tablet by mouth as needed (Low Blood Sugar)    Yes Historical Provider, MD   insulin aspart (NOVOLOG FLEXPEN) 100 UNIT/ML injection pen Inject 6 Units into the skin 3 times daily (before meals)   Yes Historical Provider, MD   metoprolol succinate (TOPROL XL) 50 MG extended release tablet Take 25 mg by mouth nightly   Yes Historical Provider, MD   pantoprazole (PROTONIX) 20 MG tablet Take 2 tablets by mouth daily  Patient taking differently: Take 20 mg by mouth 2 times daily  10/5/18  Yes Jonathan Carr DO   fluticasone (FLONASE) 50 MCG/ACT nasal spray 1 spray by Nasal route 2 times daily  12/30/17  Yes Nicky Holt MD   furosemide (LASIX) 20 MG tablet Take 1 tablet by mouth daily 12/30/17  Yes RICK Alcantara - CNP   pravastatin (PRAVACHOL) 40 MG tablet Take 20 mg by mouth nightly    Yes Historical Provider, MD   therapeutic multivitamin-minerals (THERAGRAN-M) tablet Take 1 tablet by mouth nightly Last dose 12/04/2015   Yes Historical Provider, MD       Allergies   Allergen Reactions    Dye [Iodides] Other (See Comments)     PATIENT STATES HE BLACKED OUT WHEN GIVEN IVP DYE       Family History   Problem Relation Age of Onset    Cancer Mother     Mental Illness Sister     Heart Disease Brother        Social History     Tobacco Use    Smoking status: Former Smoker     Packs/day: 0.50     Years: 25.00     Pack years: 12.50     Types: Cigarettes     Last attempt to quit: 1995     Years since quittin.3    Smokeless tobacco: Never Used   Substance Use Topics    Alcohol use: No     Comment: heavy drinker in the past--none since ;  drinks 3-4 cups of coffee dialy    Drug use: No           PHYSICAL EXAM:    Vitals:    19 1618   BP: (!) 159/91   Pulse: 70   Resp: 20   Temp: 98.1 °F (36.7 °C)   SpO2: 97%       General Appearance:  Laying in bed, awake, alert, no apparent distress  Head/face:  NC/AT  Eyes: PERRL, EOMI  ENT: Bilateral external ears WNL, Nares patent, Septum midline, R peritonsillar erythema and mild edema with small area of ulceration, no uvula deviation, Hoarse, dimished hearing bilaterally, ears clear without evidence of infection, cerumen impaction, or effusion. Neck:Supple, no adenopathy, R cervical tenderness to palpation   Lungs:  Non-labored, good respiratory effort, no stridor  Heart:  RR  Neuro: Facial nerve symmetric and intact. House Brackmann 1/6, bilaterally. LABS:  CBC  Recent Labs     19  1105   WBC 7.3   HGB 14.1   HCT 41.2   PLT 80*       RADIOLOGY  Xr Chest Standard (2 Vw)    Result Date: 2019  Location:200 Exam: XR CHEST (2 VW) Comparison: 2018 History: Dyspnea, chest pain Findings: AP and lateral views were obtained. Heart size is normal. Pulmonary vessels are nondistended. No acute pulmonary parenchymal consolidation. No acute cardiopulmonary disease. Ct Head Wo Contrast    Result Date: 2019  Location:200 Exam: CT HEAD WO CONTRAST Comparison: None. History: Vertigo Findings: Noncontrast images were obtained through the brain parenchyma. Automated dose control. The ventricles are midline and nondilated.   No acute abnormality of the brain parenchyma, intracranial hemorrhage, mass effect or large extra-axial fluid collection. No CT evidence of acute intracranial abnormality. Endoscopy Procedure Note    Pre-operative Diagnosis: Hoarseness, dysphagia, odynophagia   Post-operative Diagnosis: same  Indications: Hoarseness, dysphagia or aspiration - not able to be clearly evaluated by indirect laryngoscopy  Anesthesia: Lidocaine 4% and Dejan-Synephrine 1/2%  Endoscopy Type:  Flexible nasopharyngolaryngoscopy  Procedure Details   The flexible nasopharyngolaryngoscope was passed through the right side(s) of the nose, and the nose, nasopharynx, oropharynx, hypopharynx and larynx were examined. Examination was performed during quiet respiration and with phonation. The following findings were noted. Findings:  Normal nasopharynx, normal epiglottis, normal tongue base, normal pyriform, normal TVC mucosa, no subglottic masses or lesions.  2 mm glottic gap upon adduction, presbylarynx   Condition:  Stable  Complications:  None        ASSESSMENT:  74 y.o. male with pharyngitis, dysphagia/odynophagia, dysphonia- glottic gap and presbylarynx     PLAN:  · CT neck without contrast (anaphylactic reaction to contrast per daughter) as symptoms have been present 3-4 weeks without improvement with doxycycline, former smoker  · Will follow CT results- CT results WNL  · Recommend ABX/steroids- Clinda and decadron (pt diabetic, will give lower dose)  · Possible outpatient speech therapy if vocal symptoms do not improve  · Medical management per primary team   · Follow up with ENT as outpatient in 2 weeks after discharge   · Discussed with Dr. Anatoly Knapp    Electronically signed by Enrrique Betts DO on 5/7/19 at 7:54 PM

## 2019-05-07 NOTE — ED PROVIDER NOTES
ED Attending  CC: [place]       Department of Emergency Medicine   ED  Provider Note  Admit Date/RoomTime: 5/7/2019 10:10 AM  ED Room: 2784/6884-91  MRN: [id number]  Chief Complaint:   Pharyngitis; Tinnitus (both ears); and Chest Pain (right side )       History of Present Illness   Source of history provided by:  patient and relative(s). History/Exam Limitations: none. Amelia Ha is a 74 y.o. male who has a past medical history of:   Past Medical History:   Diagnosis Date    Arthritis     At[facility] fibrillation ([facility]     Diabetes mellitus type 2, noninsulin dependent ([facility] [address]     Dyspnea     History of cardiovascular stress test 10/05/2018    Lexiscan stress test    History of echocardiogram 12/28/2017    EF  55%    Hyperlipidemia     Hypertension     Preoperative clearance     on chart for surgery with KRISHAN SAMSON Guille 12/2015    presents to the ED for evaluation of multiple complaints. Patient went to his primary doctor's office at the [place] for his monthly B12 injection. During casual conversation he mentioned that he had some symptoms of concern, the provider was notified and, upon reviewing his story, they sent him to the emergency department for evaluation. He is been having his issues off and on for a few months. He has been having frontal headaches which are mild-to-moderate in severity. He has no associated visual disturbance, hearing changes or loss, any issues with speech, swallowing, balance or ambulation associated. No trauma or injury. Nothing has been tried for relief. Has been having bilateral tinnitus which are not associated associated with the headaches. No trauma to the ears be a penetrating injury, blunt injury to the ear structures or with loud noises or explosion. No prior ear procedures required rupture of the tympanic membrane. No excessive use of aspirin or nonsteroidal anti-inflammatory drugs. No recent or prolonged antibiotic therapy.  No neck pain or stiffness, no throbbing or pulsating, or drugs. Family History: family history includes Cancer in his mother; Heart Disease in his brother; Mental Illness in his sister. Allergies: Dye [iodides]    Physical Exam Section     ED Triage Vitals [05/07/19 1014]   BP Temp Temp Source Pulse Resp SpO2 Height Weight   (!) 206/100 97.6 °F (36.4 °C) Oral 77 18 94 % 6' (1.829 m) 265 lb (120.2 kg)       Physical Exam  Oxygen Saturation Interpretation: Normal    Constitutional:  Alert, Well hydrated and well nourish, mildly ill-appearing, non-toxic and without distress. Eyes:  PERRL, EOMI, no discharge or conjunctival injection. Ears:  TMs without perforation, injection, or bulging. External canals with increased cerumen without impaction and without exudate. Nose:  There is mild mucosal erythema and clear nasal discharge, and no swelling or lesions present. Mouth:  Mucous membranes moist without lesions, tongue and gums normal.  Throat:  Pharynx without injection, exudate, or tonsillar hypertrophy. Uvula midline without edema or erythema. Airway patient. . Vocal quality is hoarse without muffling. Neck:  Supple. No lymphadenopathy. No meningismus. No JVD is appreciated. Respiratory:  Dry hacking cough is appreciated on examination. Air entry throughout is diminished. CV:  Regular rate and rhythm, S1 and S2. Hypertensive. GI:  Abdomen Soft,round and obese, nontender, +BS. No rebound, guarding or rigidity, Suprapubic and CVA regions are nontender. Integument:  Normal turgor. Warm, dry, without visible rash, unless noted elsewhere. Lymphatic: no lymphadenopathy noted  Neurological:  Orientation age-appropriate unless noted elseware. Motor functions intact.         Lab / Imaging Results   (All laboratory and radiology results have been personally reviewed by myself)  Labs:  Results for orders placed or performed during the hospital encounter of 05/07/19   CBC auto differential   Result Value Ref Range    WBC 7.3 4.5 - 11.5 E9/L    RBC 4.28 3.80 - 5.80 E12/L    Hemoglobin 14.1 12.5 - 16.5 g/dL    Hematocrit 41.2 37.0 - 54.0 %    MCV 96.3 80.0 - 99.9 fL    MCH 32.9 26.0 - 35.0 pg    MCHC 34.2 32.0 - 34.5 %    RDW 12.8 11.5 - 15.0 fL    Platelets 80 (L) 094 - 450 E9/L    MPV 9.6 7.0 - 12.0 fL    Neutrophils % 52.2 43.0 - 80.0 %    Immature Granulocytes % 0.6 0.0 - 5.0 %    Lymphocytes % 39.5 20.0 - 42.0 %    Monocytes % 5.5 2.0 - 12.0 %    Eosinophils % 1.9 0.0 - 6.0 %    Basophils % 0.3 0.0 - 2.0 %    Neutrophils # 3.80 1.80 - 7.30 E9/L    Immature Granulocytes # 0.04 E9/L    Lymphocytes # 2.87 1.50 - 4.00 E9/L    Monocytes # 0.40 0.10 - 0.95 E9/L    Eosinophils # 0.14 0.05 - 0.50 E9/L    Basophils # 0.02 0.00 - 0.20 E9/L   Comprehensive Metabolic Panel w/ Reflex to MG   Result Value Ref Range    Sodium 138 132 - 146 mmol/L    Potassium reflex Magnesium 4.5 3.5 - 5.0 mmol/L    Chloride 99 98 - 107 mmol/L    CO2 27 22 - 29 mmol/L    Anion Gap 12 7 - 16 mmol/L    Glucose 235 (H) 74 - 99 mg/dL    BUN 19 8 - 23 mg/dL    CREATININE 1.3 (H) 0.7 - 1.2 mg/dL    GFR Non-African American 54 >=60 mL/min/1.73    GFR African American >60     Calcium 9.3 8.6 - 10.2 mg/dL    Total Protein 6.5 6.4 - 8.3 g/dL    Alb 4.1 3.5 - 5.2 g/dL    Total Bilirubin 0.4 0.0 - 1.2 mg/dL    Alkaline Phosphatase 49 40 - 129 U/L    ALT 30 0 - 40 U/L    AST 26 0 - 39 U/L   Troponin   Result Value Ref Range    Troponin <0.01 0.00 - 0.03 ng/mL   Brain Natriuretic Peptide   Result Value Ref Range    Pro- 0 - 450 pg/mL   Platelet Confirmation   Result Value Ref Range    Platelet Confirmation CONFIRMED    D-Dimer, Quantitative   Result Value Ref Range    D-Dimer, Quant <200 ng/mL DDU   EKG 12 Lead   Result Value Ref Range    Ventricular Rate 67 BPM    Atrial Rate 67 BPM    P-R Interval 236 ms    QRS Duration 86 ms    Q-T Interval 438 ms    QTc Calculation (Bazett) 462 ms    P Axis 52 degrees    R Axis -16 degrees    T Axis 74 degrees   EKG 12 Lead   Result Value Ref Range    Ventricular Rate 79 BPM    Atrial Rate 79 BPM    P-R Interval 224 ms    QRS Duration 82 ms    Q-T Interval 406 ms    QTc Calculation (Bazett) 465 ms    P Axis 41 degrees    R Axis -17 degrees    T Axis 85 degrees     Imaging: All Radiology results interpreted by Radiologist unless otherwise noted. XR CHEST STANDARD (2 VW)   Final Result   No acute cardiopulmonary disease. CT Head WO Contrast   Final Result   No CT evidence of acute intracranial abnormality. EKG #1:  Interpreted by emergency department physician unless otherwise noted. Time:  10:18:14    Rate: 79  Rhythm: Sinus. Interpretation: Sinus rhythm with first-degree AV block, rate 79, CHARIS and QTC are prolonged, QRS duration is normal, left axis deviation is noted. Generally low amplitude complexes with flattened T waves throughout without any acute findings to suggest infarction or ischemia, abnormal R wave progression. EKG #2:  Interpreted by emergency department physician unless otherwise noted. Time:  12:56:58    Rate: 67  Rhythm: Sinus. Interpretation: No significant changes in comparison to prior EKG. Sinus rhythm with first-degree AV block, rate 67, CHARIS and QTC are prolonged, QRS duration is normal, left axis deviation is noted. Generally low amplitude complexes with flattened T waves throughout without any acute findings to suggest infarction or ischemia, abnormal R wave progression.   .    ED Course / Medical Decision Making     Medications   sodium chloride flush 0.9 % injection 10 mL (has no administration in time range)   sodium chloride flush 0.9 % injection 10 mL (has no administration in time range)   acetaminophen (TYLENOL) tablet 650 mg (has no administration in time range)   pravastatin (PRAVACHOL) tablet 20 mg (has no administration in time range)   therapeutic multivitamin-minerals 1 tablet (has no administration in time range)   fluticasone (FLONASE) 50 MCG/ACT nasal spray 1 spray (has no administration in time range)   furosemide to the degree that would cause symptoms is relatively unlikely however symptoms are concerning. He recommends further evaluation to exclude pulmonary embolism or additional acute pulmonary cause. He agrees that the patient should be admitted for further evaluation. [ZD]   1318 Dimer ordered due to contrast allergy. [ZD]   7248 Case discussed with Dr. Leanne Webb, detailed overview given, he will admit the patient. [NC]      ED Course User Index  [NC] Delilah Wisdom DO  [ZD] Cami Ribera, APRN - CNP     Re-Evaluations:  5/7/19      Time: 14:56 pm    Patients symptoms show no change. The patient was ambulated and did not experience hypoxia or tachycardia, but did become visibly dyspneic and tachypneic. He was expressing some left arm and shoulder pain as a result of the minimal exertional activity as well. EKG was repeated and showed no ischemic changes. Dr. Keturah Olivares was consulted and recommended further evaluation for pulmonary embolism. D-dimer was ordered and was below the threshold for detection. Dr. Leanne Webb was consult and who will admit. Consultations:             IP CONSULT TO CARDIOLOGY  IP CONSULT TO HOSPITALIST  IP CONSULT TO RESPIRATORY CARE  IP CONSULT TO CARDIOLOGY    Procedures:   none    MDM:  74-year-old male presenting to the ER for evaluation of multiple issues. He had some headache, tinnitus and sore throat with a nonfocal neurological examination for any motor or sensory deficits. Given his age and comorbid conditions with prior history of cigarette smoking patient was recommended to have further evaluation to exclude throat malignancy related to the vocal changes and sore throat as well as further neurology evaluation of the tinnitus and headaches. CT scan was performed that showed no acute abnormalities. Is also complaining of some chest discomfort and shortness of breath, both which worsens on exertion and are associated with some exertional lightheadedness.  EKG and initial evaluation in the emergency department showed no significant abnormalities. The patient was ambulated and did not experience hypoxia or tachycardia, but did become visibly dyspneic and tachypneic. He was expressing some left arm and shoulder pain as a result of the minimal exertional activity as well. EKG was repeated and showed no ischemic changes. Dr. Marjan Ayala was consulted and recommended further evaluation for pulmonary embolism. D-dimer was ordered and was below the threshold for detection. Dr. Rosana Sierra was consult and who will admit. Counseling:   I have spoken with the patient and discussed todays results, in addition to providing specific details for the plan of care and counseling regarding the diagnosis and prognosis and are agreeable with the plan. Assessment      1. Exertional chest pain    2. Dyspnea on exertion    3. Type 2 diabetes mellitus with hyperglycemia, with long-term current use of insulin ([facility] [address]    4. Chronic renal insufficiency, unspecified stage      This patient's ED course included: a personal history and physicial examination, re-evaluation prior to disposition, multiple bedside re-evaluations, cardiac monitoring, continuous pulse oximetry and complex medical decision making and emergency management  This patient has remained hemodynamically stable and been closely monitored during their ED course. Plan   Admit to telemetry. Patient condition is stable. New Medications     Current Discharge Medication List        Electronically signed by RICK Castelan CNP   DD: 5/7/19  **This report was transcribed using voice recognition software. Every effort was made to ensure accuracy; however, inadvertent computerized transcription errors may be present.   END OF PROVIDER NOTE        RICK Castelan CNP  05/07/19 5411

## 2019-05-07 NOTE — H&P
Department of Internal Medicine        CHIEF COMPLAINT:  Dyspnea, chest pain    Reason for Admission:  New onset dyspnea with activity    HISTORY OF PRESENT ILLNESS:      The patient is a 74 y.o. male who presents with multiple complaints but mostly new onset of dyspnea with activity lungs and some vague chest pain. The patient's dyspnea started about 3 weeks ago when he developed a sore throat and some mild congestion. Family states the patient's voice is beginning more hoarse over the last 2 weeks. He went to the emergency room and was given a week of antibiotic and some decongestants and something for cough. The patient's cough is always nonproductive. Patient's also had complaints of frontal headaches-frontal, bilateral tinnitus. Chest discomfort is without radiation. The chest discomfort seems to worsen with exertion. Patient also has been getting lightheaded off and on. There is no syncope. He complains of a dry cough off and on. Patient also complains of some urinary hesitancy and dribbling at times. The patient went to the [facility] doctor with these complaints and he was then sent to our emergency room to be evaluated and possibly admitted. The doctor called his cardiologist who recommended admission for further evaluation.     Past Medical History:    Past Medical History:   Diagnosis Date    Arthritis     Atrial fibrillation ([address]     Diabetes mellitus type 2, noninsulin dependent ([address]     Dyspnea     History of cardiovascular stress test 10/05/2018    Lexiscan stress test    History of echocardiogram 12/28/2017    EF  55%    Hyperlipidemia     Hypertension     Preoperative clearance     on chart for surgery with KRISHAN SAMSON Memo 12/2015     Past Surgical History:    Past Surgical History:   Procedure Laterality Date    CARDIAC CATHETERIZATION  04/26/2012    CARDIAC CATHETERIZATION  10/12/2018    Dr. Elif Craig TEST  08/11    [facility] CATH LAB PROCEDURE 11/8/2007    Dr. Dubois Points No CAD EF 59%    DIAGNOSTIC CARDIAC CATH LAB PROCEDURE  4/26/2010    Dr. Cordell Larson: EF 66% Moderate ectasia involving RCA and cx       Medications Prior to Admission:    @  Prior to Admission medications    Medication Sig Start Date End Date Taking?  Authorizing Provider   albuterol sulfate  (90 Base) MCG/ACT inhaler Inhale 2 puffs into the lungs every 6 hours as needed for Wheezing   Yes Historical Provider, MD   albuterol (PROVENTIL) (2.5 MG/3ML) 0.083% nebulizer solution Take 2.5 mg by nebulization every 6 hours as needed for Wheezing or Shortness of Breath   Yes Historical Provider, MD   metFORMIN (GLUCOPHAGE) 1000 MG tablet Take 1,000 mg by mouth 2 times daily (with meals)   Yes Historical Provider, MD   meloxicam (MOBIC) 15 MG tablet Take 1 tablet by mouth daily 4/22/19  Yes Marciano Parks PA-C   glipiZIDE (GLUCOTROL) 10 MG tablet Take 1 tablet by mouth daily 10/12/18  Yes Karolyn Yen DO   rivaroxaban (XARELTO) 20 MG TABS tablet Take 1 tablet by mouth Daily with supper 10/14/18  Yes Chasity Weiss MD   insulin glargine (LANTUS) 100 UNIT/ML injection vial Inject 36 Units into the skin nightly   Yes Historical Provider, MD   polyethylene glycol (GLYCOLAX) powder Take 17 g by mouth daily   Yes Historical Provider, MD   lisinopril (PRINIVIL;ZESTRIL) 20 MG tablet Take 20 mg by mouth daily   Yes Historical Provider, MD   tamsulosin (FLOMAX) 0.4 MG capsule Take 0.4 mg by mouth nightly   Yes Historical Provider, MD   amiodarone (CORDARONE) 200 MG tablet Take 200 mg by mouth daily   Yes Historical Provider, MD   aspirin 81 MG EC tablet Take 81 mg by mouth nightly    Yes Historical Provider, MD   budesonide-formoterol (SYMBICORT) 160-4.5 MCG/ACT AERO Inhale 2 puffs into the lungs 2 times daily   Yes Historical Provider, MD   cetirizine (ZYRTEC) 10 MG tablet Take 10 mg by mouth daily   Yes Historical Provider, MD   Cholecalciferol (VITAMIN D) 2000 units CAPS capsule Take 2,000 Units by mouth daily   Yes Historical Provider, MD   Dextrose, Diabetic Use, (GLUCOSE) 1 g CHEW Take 1 tablet by mouth as needed (Low Blood Sugar)    Yes Historical Provider, MD   insulin aspart (NOVOLOG FLEXPEN) 100 UNIT/ML injection pen Inject 6 Units into the skin 3 times daily (before meals)   Yes Historical Provider, MD   metoprolol succinate (TOPROL XL) 50 MG extended release tablet Take 25 mg by mouth nightly   Yes Historical Provider, MD   pantoprazole (PROTONIX) 20 MG tablet Take 2 tablets by mouth daily  Patient taking differently: Take 20 mg by mouth 2 times daily  10/5/18  Yes Naz Crenshaw DO   fluticasone (FLONASE) 50 MCG/ACT nasal spray 1 spray by Nasal route 2 times daily  17  Yes Randy Riggs MD   furosemide (LASIX) 20 MG tablet Take 1 tablet by mouth daily 17  Yes RICK Gregory CNP   pravastatin (PRAVACHOL) 40 MG tablet Take 20 mg by mouth nightly    Yes Historical Provider, MD   therapeutic multivitamin-minerals (THERAGRAN-M) tablet Take 1 tablet by mouth nightly Last dose 2015   Yes Historical Provider, MD       Allergies:  Dye [iodides]    Social History:   Social History     Socioeconomic History    Marital status:       Spouse name: Not on file    Number of children: Not on file    Years of education: Not on file    Highest education level: Not on file   Occupational History    Not on file   Social Needs    Financial resource strain: Not on file    Food insecurity:     Worry: Not on file     Inability: Not on file    Transportation needs:     Medical: Not on file     Non-medical: Not on file   Tobacco Use    Smoking status: Former Smoker     Packs/day: 0.50     Years: 25.00     Pack years: 12.50     Types: Cigarettes     Last attempt to quit: 1995     Years since quittin.3    Smokeless tobacco: Never Used   Substance and Sexual Activity    Alcohol use: No     Comment: heavy drinker in the past--none since ;  drinks 3-4 cups of coffee decreased breath sounds with mild scattered crackle to auscultation bilaterally with no wheezes or rhonchi  Abdomen:  Soft, nontender, + obese, nondistended, bowel sounds present  Extremities:  No edema, peripheral pulses intact bilaterally  Neuro:  Cranial nerves II-XII grossly intact; motor and sensory function intact with no focal deficits  Skin:  No rashes, lesions or wounds    DATA:  CBC with Differential:    Lab Results   Component Value Date    WBC 7.3 05/07/2019    RBC 4.28 05/07/2019    HGB 14.1 05/07/2019    HCT 41.2 05/07/2019    PLT 80 05/07/2019    MCV 96.3 05/07/2019    MCH 32.9 05/07/2019    MCHC 34.2 05/07/2019    RDW 12.8 05/07/2019    SEGSPCT 45 03/18/2014    METASPCT 1.7 10/04/2018    LYMPHOPCT 39.5 05/07/2019    MONOPCT 5.5 05/07/2019    MYELOPCT 1.7 11/30/2018    BASOPCT 0.3 05/07/2019    MONOSABS 0.40 05/07/2019    LYMPHSABS 2.87 05/07/2019    EOSABS 0.14 05/07/2019    BASOSABS 0.02 05/07/2019     CMP:    Lab Results   Component Value Date     05/07/2019    K 4.5 05/07/2019    CL 99 05/07/2019    CO2 27 05/07/2019    BUN 19 05/07/2019    CREATININE 1.3 05/07/2019    GFRAA >60 05/07/2019    LABGLOM 54 05/07/2019    GLUCOSE 235 05/07/2019    GLUCOSE 278 04/23/2012    PROT 6.5 05/07/2019    LABALBU 4.1 05/07/2019    LABALBU 5.0 04/23/2012    CALCIUM 9.3 05/07/2019    BILITOT 0.4 05/07/2019    ALKPHOS 49 05/07/2019    AST 26 05/07/2019    ALT 30 05/07/2019     Magnesium:    Lab Results   Component Value Date    MG 1.6 10/10/2018     Phosphorus:    Lab Results   Component Value Date    PHOS 2.3 10/10/2018     PT/INR:    Lab Results   Component Value Date    PROTIME 19.6 12/28/2017    PROTIME 13.3 04/23/2012    INR 1.7 12/28/2017     Troponin:    Lab Results   Component Value Date    TROPONINI <0.01 05/07/2019     U/A:    Lab Results   Component Value Date    COLORU Yellow 03/02/2017    PROTEINU Negative 03/02/2017    PHUR 6.0 03/02/2017    WBCUA 5-10 03/02/2017    45 [place] Self NONE 04/23/2012 RBCUA NONE 03/02/2017    RBCUA 0-1 10/01/2013    BACTERIA NONE 03/02/2017    CLARITYU Clear 03/02/2017    SPECGRAV 1.010 03/02/2017    LEUKOCYTESUR MODERATE 03/02/2017    UROBILINOGEN 1.0 03/02/2017    BILIRUBINUR Negative 03/02/2017    BILIRUBINUR NEGATIVE 04/23/2012    BLOODU TRACE 03/02/2017    GLUCOSEU 500 03/02/2017    GLUCOSEU >=1000 04/23/2012     ABG:  No results found for: PH, PCO2, PO2, HCO3, BE, THGB, TCO2, O2SAT  HgBA1c:    Lab Results   Component Value Date    LABA1C 7.1 10/09/2018     FLP:    Lab Results   Component Value Date    TRIG 169 10/05/2018    HDL 29 10/05/2018    LDLCALC 51 10/05/2018    LABVLDL 34 10/05/2018     TSH:    Lab Results   Component Value Date    TSH 4.150 10/05/2018     IRON:  No results found for: IRON  LIPASE:    Lab Results   Component Value Date    LIPASE 24 03/22/2015       ASSESSMENT AND PLAN:    1. New-onset dyspnea with activity  2. Chest pain  3. Hypertension  4. Non-insulin-dependent diabetes mellitus type II  5.  Atrial fibrillation    Patient Active Problem List    Diagnosis Date Noted    S/P TURP (status post transurethral resection of prostate) 12/09/2015     Priority: High    Hyperglycemia 12/09/2015     Priority: Medium    Diabetes mellitus type 2, noninsulin dependent ([address]      Priority: Medium    Hyperlipidemia      Priority: Medium    HTN (hypertension) 03/27/2013     Priority: Medium    GERD (gastroesophageal reflux disease) 03/27/2013     Priority: Medium    Dyspnea 05/07/2019    CAD in native artery 10/12/2018    Hypertension     Hyperlipidemia     Diabetes mellitus type 2, noninsulin dependent ([address]     Atrial fibrillation ([address]     Acute coronary syndrome ([address] 10/09/2018    ACS (acute coronary syndrome) ([address] 10/09/2018    Chest pain 10/04/2018    A-fib ([address] 12/27/2017    CHF (congestive heart failure) ([address] 12/27/2017     -Increased activity in the hallway monitor heart rhythm and also O2 saturation with activity  -Home meds reviewed and continued  -Consult cardiology  -Glucoscans 4 times a day with sliding scale insulin with Humalog  -PSA  -Consult ENT with 3 weeks of sore throat and increased hoarseness  -CT the chest with contrast    Raghav Carmichael D.O.  5/7/2019  5:25 PM

## 2019-05-08 LAB
ALBUMIN SERPL-MCNC: 4 G/DL (ref 3.5–5.2)
ALP BLD-CCNC: 47 U/L (ref 40–129)
ALT SERPL-CCNC: 30 U/L (ref 0–40)
ANION GAP SERPL CALCULATED.3IONS-SCNC: 11 MMOL/L (ref 7–16)
AST SERPL-CCNC: 25 U/L (ref 0–39)
BASOPHILS ABSOLUTE: 0.02 E9/L (ref 0–0.2)
BASOPHILS RELATIVE PERCENT: 0.3 % (ref 0–2)
BILIRUB SERPL-MCNC: 0.5 MG/DL (ref 0–1.2)
BUN BLDV-MCNC: 20 MG/DL (ref 8–23)
CALCIUM SERPL-MCNC: 9.3 MG/DL (ref 8.6–10.2)
CHLORIDE BLD-SCNC: 101 MMOL/L (ref 98–107)
CHOLESTEROL, TOTAL: 109 MG/DL (ref 0–199)
CO2: 24 MMOL/L (ref 22–29)
CREAT SERPL-MCNC: 1.3 MG/DL (ref 0.7–1.2)
EOSINOPHILS ABSOLUTE: 0.02 E9/L (ref 0.05–0.5)
EOSINOPHILS RELATIVE PERCENT: 0.3 % (ref 0–6)
GFR AFRICAN AMERICAN: >60
GFR NON-AFRICAN AMERICAN: 54 ML/MIN/1.73
GLUCOSE BLD-MCNC: 266 MG/DL (ref 74–99)
HBA1C MFR BLD: 6.4 % (ref 4–5.6)
HCT VFR BLD CALC: 41.5 % (ref 37–54)
HDLC SERPL-MCNC: 35 MG/DL
HEMOGLOBIN: 14.5 G/DL (ref 12.5–16.5)
IMMATURE GRANULOCYTES #: 0.06 E9/L
IMMATURE GRANULOCYTES %: 0.8 % (ref 0–5)
LDL CHOLESTEROL CALCULATED: 62 MG/DL (ref 0–99)
LV EF: 55 %
LVEF MODALITY: NORMAL
LYMPHOCYTES ABSOLUTE: 2.48 E9/L (ref 1.5–4)
LYMPHOCYTES RELATIVE PERCENT: 32.5 % (ref 20–42)
MAGNESIUM: 1.6 MG/DL (ref 1.6–2.6)
MCH RBC QN AUTO: 33.2 PG (ref 26–35)
MCHC RBC AUTO-ENTMCNC: 34.9 % (ref 32–34.5)
MCV RBC AUTO: 95 FL (ref 80–99.9)
METER GLUCOSE: 218 MG/DL (ref 74–99)
METER GLUCOSE: 227 MG/DL (ref 74–99)
METER GLUCOSE: 382 MG/DL (ref 74–99)
METER GLUCOSE: 416 MG/DL (ref 74–99)
MONOCYTES ABSOLUTE: 0.07 E9/L (ref 0.1–0.95)
MONOCYTES RELATIVE PERCENT: 0.9 % (ref 2–12)
NEUTROPHILS ABSOLUTE: 4.98 E9/L (ref 1.8–7.3)
NEUTROPHILS RELATIVE PERCENT: 65.2 % (ref 43–80)
PDW BLD-RTO: 12.4 FL (ref 11.5–15)
PHOSPHORUS: 2.4 MG/DL (ref 2.5–4.5)
PLATELET # BLD: 91 E9/L (ref 130–450)
PLATELET CONFIRMATION: NORMAL
PMV BLD AUTO: 9.8 FL (ref 7–12)
POTASSIUM SERPL-SCNC: 4.9 MMOL/L (ref 3.5–5)
PROSTATE SPECIFIC ANTIGEN: 0.46 NG/ML (ref 0–4)
RBC # BLD: 4.37 E12/L (ref 3.8–5.8)
SODIUM BLD-SCNC: 136 MMOL/L (ref 132–146)
TOTAL PROTEIN: 6.3 G/DL (ref 6.4–8.3)
TRIGL SERPL-MCNC: 61 MG/DL (ref 0–149)
TSH SERPL DL<=0.05 MIU/L-ACNC: 1.74 UIU/ML (ref 0.27–4.2)
VLDLC SERPL CALC-MCNC: 12 MG/DL
WBC # BLD: 7.6 E9/L (ref 4.5–11.5)

## 2019-05-08 PROCEDURE — 93306 TTE W/DOPPLER COMPLETE: CPT

## 2019-05-08 PROCEDURE — 85025 COMPLETE CBC W/AUTO DIFF WBC: CPT

## 2019-05-08 PROCEDURE — 84443 ASSAY THYROID STIM HORMONE: CPT

## 2019-05-08 PROCEDURE — 84100 ASSAY OF PHOSPHORUS: CPT

## 2019-05-08 PROCEDURE — 6370000000 HC RX 637 (ALT 250 FOR IP): Performed by: INTERNAL MEDICINE

## 2019-05-08 PROCEDURE — 6360000002 HC RX W HCPCS: Performed by: STUDENT IN AN ORGANIZED HEALTH CARE EDUCATION/TRAINING PROGRAM

## 2019-05-08 PROCEDURE — 6360000002 HC RX W HCPCS: Performed by: NURSE PRACTITIONER

## 2019-05-08 PROCEDURE — 82962 GLUCOSE BLOOD TEST: CPT

## 2019-05-08 PROCEDURE — G0378 HOSPITAL OBSERVATION PER HR: HCPCS

## 2019-05-08 PROCEDURE — 2500000003 HC RX 250 WO HCPCS: Performed by: STUDENT IN AN ORGANIZED HEALTH CARE EDUCATION/TRAINING PROGRAM

## 2019-05-08 PROCEDURE — 84153 ASSAY OF PSA TOTAL: CPT

## 2019-05-08 PROCEDURE — 94640 AIRWAY INHALATION TREATMENT: CPT

## 2019-05-08 PROCEDURE — 99222 1ST HOSP IP/OBS MODERATE 55: CPT | Performed by: OTOLARYNGOLOGY

## 2019-05-08 PROCEDURE — 80053 COMPREHEN METABOLIC PANEL: CPT

## 2019-05-08 PROCEDURE — 80061 LIPID PANEL: CPT

## 2019-05-08 PROCEDURE — 96376 TX/PRO/DX INJ SAME DRUG ADON: CPT

## 2019-05-08 PROCEDURE — 96366 THER/PROPH/DIAG IV INF ADDON: CPT

## 2019-05-08 PROCEDURE — 6370000000 HC RX 637 (ALT 250 FOR IP): Performed by: NURSE PRACTITIONER

## 2019-05-08 PROCEDURE — 2580000003 HC RX 258: Performed by: INTERNAL MEDICINE

## 2019-05-08 PROCEDURE — 36415 COLL VENOUS BLD VENIPUNCTURE: CPT

## 2019-05-08 PROCEDURE — 96375 TX/PRO/DX INJ NEW DRUG ADDON: CPT

## 2019-05-08 PROCEDURE — 83036 HEMOGLOBIN GLYCOSYLATED A1C: CPT

## 2019-05-08 PROCEDURE — 83735 ASSAY OF MAGNESIUM: CPT

## 2019-05-08 RX ORDER — FUROSEMIDE 10 MG/ML
40 INJECTION INTRAMUSCULAR; INTRAVENOUS ONCE
Status: COMPLETED | OUTPATIENT
Start: 2019-05-08 | End: 2019-05-08

## 2019-05-08 RX ORDER — FUROSEMIDE 10 MG/ML
20 INJECTION INTRAMUSCULAR; INTRAVENOUS ONCE
Status: DISCONTINUED | OUTPATIENT
Start: 2019-05-08 | End: 2019-05-08

## 2019-05-08 RX ORDER — METOPROLOL SUCCINATE 50 MG/1
50 TABLET, EXTENDED RELEASE ORAL NIGHTLY
Status: DISCONTINUED | OUTPATIENT
Start: 2019-05-08 | End: 2019-05-09 | Stop reason: HOSPADM

## 2019-05-08 RX ADMIN — CLINDAMYCIN PHOSPHATE 600 MG: 600 INJECTION, SOLUTION INTRAVENOUS at 13:29

## 2019-05-08 RX ADMIN — PRAVASTATIN SODIUM 20 MG: 20 TABLET ORAL at 21:45

## 2019-05-08 RX ADMIN — AMIODARONE HYDROCHLORIDE 200 MG: 200 TABLET ORAL at 08:46

## 2019-05-08 RX ADMIN — POLYETHYLENE GLYCOL 3350 17 G: 17 POWDER, FOR SOLUTION ORAL at 09:56

## 2019-05-08 RX ADMIN — TAMSULOSIN HYDROCHLORIDE 0.4 MG: 0.4 CAPSULE ORAL at 21:45

## 2019-05-08 RX ADMIN — LISINOPRIL 20 MG: 20 TABLET ORAL at 08:46

## 2019-05-08 RX ADMIN — PANTOPRAZOLE SODIUM 40 MG: 40 TABLET, DELAYED RELEASE ORAL at 08:46

## 2019-05-08 RX ADMIN — DEXAMETHASONE SODIUM PHOSPHATE 6 MG: 10 INJECTION INTRAMUSCULAR; INTRAVENOUS at 13:30

## 2019-05-08 RX ADMIN — MELOXICAM 15 MG: 7.5 TABLET ORAL at 08:46

## 2019-05-08 RX ADMIN — CLINDAMYCIN PHOSPHATE 600 MG: 600 INJECTION, SOLUTION INTRAVENOUS at 05:12

## 2019-05-08 RX ADMIN — Medication 10 ML: at 08:45

## 2019-05-08 RX ADMIN — FLUTICASONE PROPIONATE 1 SPRAY: 50 SPRAY, METERED NASAL at 21:44

## 2019-05-08 RX ADMIN — MOMETASONE FUROATE AND FORMOTEROL FUMARATE DIHYDRATE 2 PUFF: 200; 5 AEROSOL RESPIRATORY (INHALATION) at 18:42

## 2019-05-08 RX ADMIN — MULTIPLE VITAMINS W/ MINERALS TAB 1 TABLET: TAB at 21:45

## 2019-05-08 RX ADMIN — Medication 10 ML: at 21:47

## 2019-05-08 RX ADMIN — CETIRIZINE HYDROCHLORIDE 10 MG: 10 TABLET, FILM COATED ORAL at 08:45

## 2019-05-08 RX ADMIN — FUROSEMIDE 20 MG: 20 TABLET ORAL at 08:45

## 2019-05-08 RX ADMIN — ACETAMINOPHEN 650 MG: 325 TABLET, FILM COATED ORAL at 09:14

## 2019-05-08 RX ADMIN — INSULIN LISPRO 6 UNITS: 100 INJECTION, SOLUTION INTRAVENOUS; SUBCUTANEOUS at 21:46

## 2019-05-08 RX ADMIN — RIVAROXABAN 20 MG: 20 TABLET, FILM COATED ORAL at 18:39

## 2019-05-08 RX ADMIN — VITAMIN D 2000 UNITS: 25 TAB ORAL at 08:45

## 2019-05-08 RX ADMIN — INSULIN LISPRO 10 UNITS: 100 INJECTION, SOLUTION INTRAVENOUS; SUBCUTANEOUS at 18:41

## 2019-05-08 RX ADMIN — FLUTICASONE PROPIONATE 1 SPRAY: 50 SPRAY, METERED NASAL at 08:45

## 2019-05-08 RX ADMIN — INSULIN LISPRO 4 UNITS: 100 INJECTION, SOLUTION INTRAVENOUS; SUBCUTANEOUS at 08:46

## 2019-05-08 RX ADMIN — INSULIN GLARGINE 36 UNITS: 100 INJECTION, SOLUTION SUBCUTANEOUS at 21:46

## 2019-05-08 RX ADMIN — DEXAMETHASONE SODIUM PHOSPHATE 6 MG: 10 INJECTION INTRAMUSCULAR; INTRAVENOUS at 05:12

## 2019-05-08 RX ADMIN — INSULIN LISPRO 4 UNITS: 100 INJECTION, SOLUTION INTRAVENOUS; SUBCUTANEOUS at 13:30

## 2019-05-08 RX ADMIN — FUROSEMIDE 40 MG: 10 INJECTION, SOLUTION INTRAMUSCULAR; INTRAVENOUS at 18:39

## 2019-05-08 RX ADMIN — MOMETASONE FUROATE AND FORMOTEROL FUMARATE DIHYDRATE 2 PUFF: 200; 5 AEROSOL RESPIRATORY (INHALATION) at 06:25

## 2019-05-08 RX ADMIN — GLIPIZIDE 10 MG: 5 TABLET ORAL at 08:46

## 2019-05-08 RX ADMIN — METOPROLOL SUCCINATE 50 MG: 50 TABLET, EXTENDED RELEASE ORAL at 21:45

## 2019-05-08 RX ADMIN — ASPIRIN 81 MG: 81 TABLET, COATED ORAL at 21:45

## 2019-05-08 ASSESSMENT — PAIN SCALES - GENERAL
PAINLEVEL_OUTOF10: 0
PAINLEVEL_OUTOF10: 4
PAINLEVEL_OUTOF10: 0

## 2019-05-08 ASSESSMENT — PAIN DESCRIPTION - DESCRIPTORS: DESCRIPTORS: HEADACHE

## 2019-05-08 ASSESSMENT — PAIN DESCRIPTION - PAIN TYPE: TYPE: ACUTE PAIN

## 2019-05-08 ASSESSMENT — PAIN DESCRIPTION - LOCATION: LOCATION: HEAD

## 2019-05-08 NOTE — CONSULTS
Cardiology consult  Dr. Joon Coronado      Reason for Consult: Dyspnea   Requesting Physician: Chata Combs DO  CHIEF COMPLAINT: Shortness of breath   History Obtained From: patient, electronic medical record  HISTORY OF PRESENT ILLNESS:   Patient is a 74year old male with a medical history of Hypertension, Atrial Fibrillation, Hyperlipidemia, and Type II Diabetes. Patient was admitted to the hospital with dyspnea, cardiology was consulted. Patient reports that over the past month he has been have shortness of breath, nasal congestion, and a cough. He did attempt to go to the ED in [facility], when his symptoms persisted he was prompted the re present to the hospital. He reports that he did attempt to be complaint with his medications, but this did not help his symptoms. Patient denies any chest pain, no lightheadedness, no dizziness, no palpitations, no syncope, no presyncopal episodes.         Past Medical History:   Diagnosis Date    Arthritis     Atrial fibrillation ([address]     Diabetes mellitus type 2, noninsulin dependent ([facility] [address]     Dyspnea     History of cardiovascular stress test 10/05/2018    Lexiscan stress test    History of echocardiogram 12/28/2017    EF  55%    Hyperlipidemia     Hypertension     Preoperative clearance     on chart for surgery with KRISHAN SAMSON Guille 12/2015       Past Surgical History:   Procedure Laterality Date    CARDIAC CATHETERIZATION  04/26/2012    CARDIAC CATHETERIZATION  10/12/2018    Dr. Ambrocio Putt TEST  08/11    CHOLECYSTECTOMY     [address] CATH LAB PROCEDURE  11/8/2007    Dr. Anjana Miller No CAD EF 59%    DIAGNOSTIC CARDIAC CATH LAB PROCEDURE  4/26/2010    Dr. Loretha Riedel: EF 66% Moderate ectasia involving RCA and cx         Current Facility-Administered Medications:     sodium chloride flush 0.9 % injection 10 mL, 10 mL, Intravenous, 2 times per day, Chata Combs DO, 10 mL at 05/08/19 0845    sodium chloride flush 0.9 % injection 10 mL, 10 mL, Intravenous, PRN, Shan Malik, DO    acetaminophen (TYLENOL) tablet 650 mg, 650 mg, Oral, Q4H PRN, Shan Malik, DO, 650 mg at 05/08/19 0914    pravastatin (PRAVACHOL) tablet 20 mg, 20 mg, Oral, Nightly, Jed A Connor, DO, 20 mg at 05/07/19 2051    therapeutic multivitamin-minerals 1 tablet, 1 tablet, Oral, Nightly, Shan Malik, DO, 1 tablet at 05/07/19 2051    fluticasone (FLONASE) 50 MCG/ACT nasal spray 1 spray, 1 spray, Nasal, BID, Shan Malik, DO, 1 spray at 05/08/19 0845    furosemide (LASIX) tablet 20 mg, 20 mg, Oral, Daily, Jed A Connor, DO, 20 mg at 05/08/19 0845    polyethylene glycol (GLYCOLAX) packet 17 g, 17 g, Oral, Daily, Jed A Connor, DO, 17 g at 05/08/19 0956    lisinopril (PRINIVIL;ZESTRIL) tablet 20 mg, 20 mg, Oral, Daily, Shan Malik, DO, 20 mg at 05/08/19 0846    tamsulosin (FLOMAX) capsule 0.4 mg, 0.4 mg, Oral, Nightly, Shan Malik, DO, 0.4 mg at 05/07/19 2052    amiodarone (CORDARONE) tablet 200 mg, 200 mg, Oral, Daily, Shan Malik, DO, 200 mg at 05/08/19 0846    aspirin EC tablet 81 mg, 81 mg, Oral, Nightly, Shan Malik, DO, 81 mg at 05/07/19 2052    mometasone-formoterol (DULERA) 200-5 MCG/ACT inhaler 2 puff, 2 puff, Inhalation, BID, Shan Malik, DO, 2 puff at 05/08/19 0347    cetirizine (ZYRTEC) tablet 10 mg, 10 mg, Oral, Daily, Shan Malik, DO, 10 mg at 05/08/19 0845    vitamin D (CHOLECALCIFEROL) tablet 2,000 Units, 2,000 Units, Oral, Daily, Geannie Bon, DO, 2,000 Units at 05/08/19 0845    metoprolol succinate (TOPROL XL) extended release tablet 25 mg, 25 mg, Oral, Nightly, Jed Connor DO, 25 mg at 05/07/19 2051    pantoprazole (PROTONIX) tablet 40 mg, 40 mg, Oral, Daily, Jed Connor DO, 40 mg at 05/08/19 0846    insulin glargine (LANTUS) injection vial 36 Units, 36 Units, Subcutaneous, Nightly, Shan Malik DO, 36 Units at 05/07/19 2052    glipiZIDE (GLUCOTROL) tablet 10 mg, 10 mg, Oral, Daily, Deedee Zuñiga DO, 10 mg at 05/08/19 0846    rivaroxaban (XARELTO) tablet 20 mg, 20 mg, Oral, Dinner, Deedee Zuñiga DO, 20 mg at 05/07/19 1724    meloxicam (MOBIC) tablet 15 mg, 15 mg, Oral, Daily, Jed Perealap, DO, 15 mg at 05/08/19 0846    albuterol sulfate  (90 Base) MCG/ACT inhaler 2 puff, 2 puff, Inhalation, Q6H PRN, Deedee FloridaDO    albuterol (PROVENTIL) nebulizer solution 2.5 mg, 2.5 mg, Nebulization, Q6H PRN, Deedee Zuñiga DO    glucose (GLUTOSE) 40 % oral gel 15 g, 15 g, Oral, PRN, [facility]    dextrose 50 % solution 12.5 g, 12.5 g, Intravenous, PRN, Deedee FloridaDO    glucagon (rDNA) injection 1 mg, 1 mg, Intramuscular, PRN, Deedee FloridaDO    dextrose 5 % solution, 100 mL/hr, Intravenous, PRN, [facility]    insulin lispro (HUMALOG) injection vial 0-12 Units, 0-12 Units, Subcutaneous, TID WC, Deedee Zuñiga DO, 4 Units at 05/08/19 0846    insulin lispro (HUMALOG) injection vial 0-6 Units, 0-6 Units, Subcutaneous, Nightly, Deedee Zuñiga DO, 2 Units at 05/07/19 2052    clindamycin (CLEOCIN) 600 mg in dextrose 5 % 50 mL IVPB, 600 mg, Intravenous, Q8H, Dion Isabel DO, Stopped at 05/08/19 0603    dexamethasone (DECADRON) injection 6 mg, 6 mg, Intravenous, Q8H, Kristopher Patricio DO, 6 mg at 05/08/19 6277    Allergies as of 05/07/2019 - Review Complete 05/07/2019   Allergen Reaction Noted    Dye [iodides] Other (See Comments) 04/26/2012       Social History     Socioeconomic History    Marital status:       Spouse name: Not on file    Number of children: Not on file    Years of education: Not on file    Highest education level: Not on file   Occupational History    Not on file   Social Needs    Financial resource strain: Not on file    Food insecurity:     Worry: Not on file     Inability: Not on file    Transportation needs:     Medical: Not on file     Non-medical: Not on file   Tobacco Use    Smoking status: Former Smoker     Packs/day: 0.50     Years: 25.00     Pack years: 12.50     Types: Cigarettes     Last attempt to quit: 1995     Years since quittin.3    Smokeless tobacco: Never Used   Substance and Sexual Activity    Alcohol use: No     Comment: heavy drinker in the past--none since ;  drinks 3-4 cups of coffee dialy    Drug use: No    Sexual activity: Not on file   Lifestyle    Physical activity:     Days per week: Not on file     Minutes per session: Not on file    Stress: Not on file   Relationships    Social connections:     Talks on phone: Not on file     Gets together: Not on file     Attends Sabianism service: Not on file     Active member of club or organization: Not on file     Attends meetings of clubs or organizations: Not on file     Relationship status: Not on file    Intimate partner violence:     Fear of current or ex partner: Not on file     Emotionally abused: Not on file     Physically abused: Not on file     Forced sexual activity: Not on file   Other Topics Concern    Not on file   Social History Narrative    Not on file       Family History   Problem Relation Age of Onset    Cancer Mother     Mental Illness Sister     Heart Disease Brother        REVIEW OF SYSTEMS:     CONSTITUTIONAL:  negative for  fevers, chills, sweats and + fatigue  EYES:  negative for  double vision, blurred vision and blind spots  HEENT:  negative for  tinnitus, earaches, nasal congestion and epistaxis  RESPIRATORY:  negative for  dry cough, + cough with sputum, + dyspnea, wheezing and hemoptysis  CARDIOVASCULAR: as per HPI  GASTROINTESTINAL:  negative for nausea, vomiting, diarrhea, constipation, pruritus and jaundice  GENITOURINARY:  negative for frequency, dysuria, nocturia, urinary incontinence and hesitancy  HEMATOLOGIC/LYMPHATIC:  negative for easy bruising, bleeding, lymphadenopathy and petechiae  ALLERGIC/IMMUNOLOGIC:  negative for urticaria, hay fever and and PVCs     ECHO:  10/11/18    Summary   Compared to prior echo, changes noted. Technically adequate study.   Moderate concentric left ventricular hypertrophy.   Moderate concentric left ventricular hypertrophy.   Ejection fraction is visually estimated at 55%.   No regional wall motion abnormalities seen.  Uma Live is doppler evidence of stage II diastolic dysfunction.   Physiologic and/or trace mitral regurgitation is present.   Physiologic and/or trace tricuspid regurgitation.  RVSP is 39 mmHg.   Pulmonary hypertension is mild . Stress Test: 10/5/18    Angiography:  10/12/18  IMPRESSION:  1. Mildly ectatic proximal segment of the LAD.   2.  Luminal irregularities of the LAD and the right coronary artery.         Cardiology Labs:   BMP:    Lab Results   Component Value Date     05/08/2019    K 4.9 05/08/2019    K 4.5 05/07/2019     05/08/2019    CO2 24 05/08/2019    BUN 20 05/08/2019     CMP:    Lab Results   Component Value Date     05/08/2019    K 4.9 05/08/2019    K 4.5 05/07/2019     05/08/2019    CO2 24 05/08/2019    BUN 20 05/08/2019    PROT 6.3 05/08/2019     CBC:    Lab Results   Component Value Date    WBC 7.6 05/08/2019    RBC 4.37 05/08/2019    HGB 14.5 05/08/2019    HCT 41.5 05/08/2019    MCV 95.0 05/08/2019    RDW 12.4 05/08/2019    PLT 91 05/08/2019     PT/INR:  No results found for: PTINR  PT/INR Warfarin:  No components found for: PTPATWAR, PTINRWAR  PTT:    Lab Results   Component Value Date    APTT 38.1 12/28/2017     PTT Heparin:  No components found for: APTTHEP  Magnesium:    Lab Results   Component Value Date    MG 1.6 05/08/2019     TSH:    Lab Results   Component Value Date    TSH 1.740 05/08/2019     TROPONIN:  No components found for: TROP  BNP:    Lab Results   Component Value Date    BNP 42 01/31/2012     FASTING LIPID PANEL:    Lab Results   Component Value Date    CHOL 109 05/08/2019    HDL 35 05/08/2019    TRIG 61 05/08/2019     CT SOFT TISSUE NECK WO CONTRAST   Final Result   Normal CT soft tissue neck. XR CHEST STANDARD (2 VW)   Final Result   No acute cardiopulmonary disease. CT Head WO Contrast   Final Result   No CT evidence of acute intracranial abnormality. I have personally reviewed the laboratory, cardiac diagnostic and radiographic testing as outlined above:    IMPRESSION:  1. Dyspnea: Etiology? Pulmonary vs. Cardiac? Will review echocardiogram. BNP is 103. He does not appear fluid volume overloaded. Patient become hypoxic and tachycardic with ambulation? Will increase betablocker, will monitor HR and O2 saturation with ambulation and rest   2. Hypertension: Controled   3. Atrial Fibrillation: paroxsymal. Currently SR. Chronic anticoagulation with Xarelto   4. Hyperlipidemia: on statin therapy    5. Type II Diabetes   6. Recent cardiac catheterization with out evidence of CAD, Mildly ectatic proximal segment of the LAD. Luminal irregularities of the LAD and the right coronary artery. 7. Obesity   8. Horse Voice and Sinus Congestion: will follow ENT   9. Medical noncompliance       RECOMMENDATIONS:   1. Increase Toprol XL 50 mg daily   2. Will give 1x dose of IV lasix and monitor clinical and diuretic response  3. Will monitor HR and O2 with rest and ambulation   4. Will continue rest of current treatment   5. CBC and BMP   6. Increase ambulation as tolerated  7. Intake and Output   8. Further cardiac recommendations forthcoming pending patients clinical progression and diagnostic test findings          I have reviewed my findings and recommendations with patient  discussed with Dr. Johan Brian     Thank you for the consult! Electronically signed by RICK Rene CNP on 5/8/2019 at 1:29 PM  I have discussed the care of patient including pertinent history and exam and reviewed chart, vitals, labs and radiologic studies.  I also participated in medical decision making with Pedrito Eaton CNP on the date of service and I agree with all of the pertinent clinical information, assessment and treatment plan and status of the problem list as documented and have edited it. NOTE: This report was transcribed using voice recognition software.  Every effort was made to ensure accuracy; however, inadvertent computerized transcription errors may be present

## 2019-05-08 NOTE — PLAN OF CARE
Problem: Falls - Risk of:  Goal: Will remain free from falls  Description  Will remain free from falls  5/8/2019 1234 by Romana Hu RN  Outcome: Met This Shift     Problem: Breathing Pattern - Ineffective:  Goal: Ability to achieve and maintain a regular respiratory rate will improve  Description  Ability to achieve and maintain a regular respiratory rate will improve  5/8/2019 1234 by Romana Hu RN  Outcome: Met This Shift     Problem: Cardiac:  Goal: Ability to maintain vital signs within normal range will improve  Description  Ability to maintain vital signs within normal range will improve  5/8/2019 1234 by Romana Hu RN  Outcome: Met This Shift     Problem: Physical Regulation:  Goal: Complications related to the disease process, condition or treatment will be avoided or minimized  Description  Complications related to the disease process, condition or treatment will be avoided or minimized  Outcome: Met This Shift     Problem: Serum Glucose Level - Abnormal:  Goal: Ability to maintain appropriate glucose levels has stabilized  Description  Ability to maintain appropriate glucose levels has stabilized  Outcome: Met This Shift

## 2019-05-08 NOTE — PROGRESS NOTES
Results   Component Value Date    PROTIME 19.6 12/28/2017    PROTIME 13.3 04/23/2012    INR 1.7 12/28/2017     Troponin:    Lab Results   Component Value Date    TROPONINI <0.01 05/07/2019     U/A:    Lab Results   Component Value Date    COLORU Yellow 03/02/2017    PROTEINU Negative 03/02/2017    PHUR 6.0 03/02/2017    WBCUA 5-10 03/02/2017    WBCUA NONE 04/23/2012    RBCUA NONE 03/02/2017    RBCUA 0-1 10/01/2013    BACTERIA NONE 03/02/2017    CLARITYU Clear 03/02/2017    SPECGRAV 1.010 03/02/2017    LEUKOCYTESUR MODERATE 03/02/2017    UROBILINOGEN 1.0 03/02/2017    BILIRUBINUR Negative 03/02/2017    BILIRUBINUR NEGATIVE 04/23/2012    BLOODU TRACE 03/02/2017    GLUCOSEU 500 03/02/2017    GLUCOSEU >=1000 04/23/2012     HgBA1c:    Lab Results   Component Value Date    LABA1C 7.1 10/09/2018     FLP:    Lab Results   Component Value Date    TRIG 61 05/08/2019    HDL 35 05/08/2019    LDLCALC 62 05/08/2019    LABVLDL 12 05/08/2019     TSH:    Lab Results   Component Value Date    TSH 1.740 05/08/2019     LIPASE:    Lab Results   Component Value Date    LIPASE 24 03/22/2015       Recent Labs     05/07/19  1657 05/07/19  2036 05/08/19  0541   GLUMET 66* 240* 218*       CT SOFT TISSUE NECK WO CONTRAST   Final Result   Normal CT soft tissue neck. XR CHEST STANDARD (2 VW)   Final Result   No acute cardiopulmonary disease. CT Head WO Contrast   Final Result   No CT evidence of acute intracranial abnormality.               sodium chloride flush  10 mL Intravenous 2 times per day    pravastatin  20 mg Oral Nightly    therapeutic multivitamin-minerals  1 tablet Oral Nightly    fluticasone  1 spray Nasal BID    furosemide  20 mg Oral Daily    polyethylene glycol  17 g Oral Daily    lisinopril  20 mg Oral Daily    tamsulosin  0.4 mg Oral Nightly    amiodarone  200 mg Oral Daily    aspirin  81 mg Oral Nightly    mometasone-formoterol  2 puff Inhalation BID    cetirizine  10 mg Oral Daily    vitamin D 2,000 Units Oral Daily    metoprolol succinate  25 mg Oral Nightly    pantoprazole  40 mg Oral Daily    insulin glargine  36 Units Subcutaneous Nightly    glipiZIDE  10 mg Oral Daily    rivaroxaban  20 mg Oral Dinner    meloxicam  15 mg Oral Daily    insulin lispro  0-12 Units Subcutaneous TID WC    insulin lispro  0-6 Units Subcutaneous Nightly    clindamycin (CLEOCIN) IV  600 mg Intravenous Q8H    dexamethasone  6 mg Intravenous Q8H        Assessment; Active Problems:    Dyspnea    Exertional chest pain    Hypertension    Hyperlipidemia    Paroxysmal Atrial fibrillation - currently sinus rhythm    Diabetes mellitus type 2, noninsulin dependent ([facility] [address]      Plan:   Blood pressure supine and standing  O2 saturation on room air with activity  Meds reviewed and continued  Pending cardiology eval    See orders.         Naz Crenshaw DO  10:10 AM  5/8/2019

## 2019-05-08 NOTE — PLAN OF CARE
Problem: Falls - Risk of:  Goal: Will remain free from falls  Description  Will remain free from falls  5/8/2019 0449 by Benjie Dodd RN  Outcome: Met This Shift  5/7/2019 2137 by Severa Columbus, RN  Outcome: Met This Shift  Goal: Absence of physical injury  Description  Absence of physical injury  5/8/2019 0449 by Benjie Dodd RN  Outcome: Met This Shift  5/7/2019 2137 by Severa Columbus, RN  Outcome: Met This Shift     Problem: Breathing Pattern - Ineffective:  Goal: Ability to achieve and maintain a regular respiratory rate will improve  Description  Ability to achieve and maintain a regular respiratory rate will improve  5/8/2019 0449 by Benjie Dodd RN  Outcome: Met This Shift  5/7/2019 2137 by Severa Columbus, RN  Outcome: Not Met This Shift     Problem: Cardiac:  Goal: Ability to maintain vital signs within normal range will improve  Description  Ability to maintain vital signs within normal range will improve  5/8/2019 0449 by Benjie Dodd RN  Outcome: Met This Shift  5/7/2019 2137 by Severa Columbus, RN  Outcome: Met This Shift  Goal: Cardiovascular alteration will improve  Description  Cardiovascular alteration will improve  5/8/2019 0449 by Benjie Dodd RN  Outcome: Met This Shift  5/7/2019 2137 by Severa Columbus, RN  Outcome: Met This Shift

## 2019-05-08 NOTE — PLAN OF CARE
Problem: Falls - Risk of:  Goal: Will remain free from falls  Description  Will remain free from falls  Outcome: Met This Shift     Problem: Falls - Risk of:  Goal: Absence of physical injury  Description  Absence of physical injury  Outcome: Met This Shift     Problem: Cardiac:  Goal: Ability to maintain vital signs within normal range will improve  Description  Ability to maintain vital signs within normal range will improve  Outcome: Met This Shift     Problem: Cardiac:  Goal: Cardiovascular alteration will improve  Description  Cardiovascular alteration will improve  Outcome: Met This Shift     Problem: Breathing Pattern - Ineffective:  Goal: Ability to achieve and maintain a regular respiratory rate will improve  Description  Ability to achieve and maintain a regular respiratory rate will improve  Outcome: Not Met This Shift     Problem: Serum Glucose Level - Abnormal:  Goal: Ability to maintain appropriate glucose levels has stabilized  Description  Ability to maintain appropriate glucose levels has stabilized  Outcome: Not Met This Shift

## 2019-05-08 NOTE — PROGRESS NOTES
Patient was 96% RA at rest, patient ambulated length of hallway and remained about 95% RA and heart rate stayed in the 80s.

## 2019-05-09 VITALS
HEIGHT: 72 IN | WEIGHT: 265 LBS | TEMPERATURE: 97.8 F | BODY MASS INDEX: 35.89 KG/M2 | HEART RATE: 64 BPM | SYSTOLIC BLOOD PRESSURE: 130 MMHG | RESPIRATION RATE: 18 BRPM | OXYGEN SATURATION: 98 % | DIASTOLIC BLOOD PRESSURE: 70 MMHG

## 2019-05-09 LAB
ANION GAP SERPL CALCULATED.3IONS-SCNC: 12 MMOL/L (ref 7–16)
BUN BLDV-MCNC: 28 MG/DL (ref 8–23)
CALCIUM SERPL-MCNC: 9 MG/DL (ref 8.6–10.2)
CHLORIDE BLD-SCNC: 92 MMOL/L (ref 98–107)
CO2: 25 MMOL/L (ref 22–29)
CREAT SERPL-MCNC: 1.5 MG/DL (ref 0.7–1.2)
GFR AFRICAN AMERICAN: 55
GFR NON-AFRICAN AMERICAN: 46 ML/MIN/1.73
GLUCOSE BLD-MCNC: 447 MG/DL (ref 74–99)
METER GLUCOSE: 282 MG/DL (ref 74–99)
METER GLUCOSE: 372 MG/DL (ref 74–99)
POTASSIUM SERPL-SCNC: 4.8 MMOL/L (ref 3.5–5)
SODIUM BLD-SCNC: 129 MMOL/L (ref 132–146)

## 2019-05-09 PROCEDURE — 94640 AIRWAY INHALATION TREATMENT: CPT

## 2019-05-09 PROCEDURE — G0378 HOSPITAL OBSERVATION PER HR: HCPCS

## 2019-05-09 PROCEDURE — 82962 GLUCOSE BLOOD TEST: CPT

## 2019-05-09 PROCEDURE — 6370000000 HC RX 637 (ALT 250 FOR IP): Performed by: INTERNAL MEDICINE

## 2019-05-09 PROCEDURE — 36415 COLL VENOUS BLD VENIPUNCTURE: CPT

## 2019-05-09 PROCEDURE — 80048 BASIC METABOLIC PNL TOTAL CA: CPT

## 2019-05-09 PROCEDURE — 2580000003 HC RX 258: Performed by: INTERNAL MEDICINE

## 2019-05-09 RX ORDER — DEXAMETHASONE 4 MG/1
4 TABLET ORAL 2 TIMES DAILY WITH MEALS
Qty: 10 TABLET | Refills: 0 | Status: SHIPPED | OUTPATIENT
Start: 2019-05-09 | End: 2019-05-14

## 2019-05-09 RX ORDER — AMOXICILLIN AND CLAVULANATE POTASSIUM 875; 125 MG/1; MG/1
1 TABLET, FILM COATED ORAL 2 TIMES DAILY
Qty: 14 TABLET | Refills: 0 | Status: SHIPPED | OUTPATIENT
Start: 2019-05-09 | End: 2019-05-16

## 2019-05-09 RX ADMIN — MOMETASONE FUROATE AND FORMOTEROL FUMARATE DIHYDRATE 2 PUFF: 200; 5 AEROSOL RESPIRATORY (INHALATION) at 06:58

## 2019-05-09 RX ADMIN — AMIODARONE HYDROCHLORIDE 200 MG: 200 TABLET ORAL at 08:52

## 2019-05-09 RX ADMIN — POLYETHYLENE GLYCOL 3350 17 G: 17 POWDER, FOR SOLUTION ORAL at 08:52

## 2019-05-09 RX ADMIN — FUROSEMIDE 20 MG: 20 TABLET ORAL at 08:52

## 2019-05-09 RX ADMIN — BENZOCAINE AND MENTHOL 1 LOZENGE: 15; 3.6 LOZENGE ORAL at 14:48

## 2019-05-09 RX ADMIN — LISINOPRIL 20 MG: 20 TABLET ORAL at 08:52

## 2019-05-09 RX ADMIN — INSULIN LISPRO 10 UNITS: 100 INJECTION, SOLUTION INTRAVENOUS; SUBCUTANEOUS at 12:25

## 2019-05-09 RX ADMIN — INSULIN LISPRO 6 UNITS: 100 INJECTION, SOLUTION INTRAVENOUS; SUBCUTANEOUS at 08:53

## 2019-05-09 RX ADMIN — FLUTICASONE PROPIONATE 1 SPRAY: 50 SPRAY, METERED NASAL at 10:18

## 2019-05-09 RX ADMIN — Medication 10 ML: at 08:53

## 2019-05-09 RX ADMIN — CETIRIZINE HYDROCHLORIDE 10 MG: 10 TABLET, FILM COATED ORAL at 08:52

## 2019-05-09 RX ADMIN — MELOXICAM 15 MG: 7.5 TABLET ORAL at 08:52

## 2019-05-09 RX ADMIN — VITAMIN D 2000 UNITS: 25 TAB ORAL at 08:52

## 2019-05-09 RX ADMIN — GLIPIZIDE 10 MG: 5 TABLET ORAL at 08:52

## 2019-05-09 RX ADMIN — PANTOPRAZOLE SODIUM 40 MG: 40 TABLET, DELAYED RELEASE ORAL at 08:52

## 2019-05-09 ASSESSMENT — PAIN SCALES - GENERAL: PAINLEVEL_OUTOF10: 0

## 2019-05-09 NOTE — DISCHARGE SUMMARY
Name:  Blanca Goldsmith  :    MRN:  [id number]  Room:  [id number]  DOS:  2019    [address] [facility]  Internal Medicine  Discharge Summary    PCP:  No primary care provider on file. Admitting Physician:  Latoya Holt DO  Consultant(s):  IP CONSULT TO CARDIOLOGY  IP CONSULT TO HOSPITALIST  IP CONSULT TO RESPIRATORY CARE  IP CONSULT TO CARDIOLOGY  IP CONSULT TO OTOLARYNGOLOGY      Admission Date:  2019   Discharge Date:  2019      Admission Diagnoses  Dyspnea [R06.00]     Discharge Diagnoses    Active Problems:    Dyspnea    Exertional chest pain    Pharyngitis, odynophagia    Hypertension    Hyperlipidemia    Paroxysmal Atrial fibrillation - currently sinus rhythm    Diabetes mellitus type 2, noninsulin dependent (HCC)    Obese    Medical noncompliance        Brief History of Present Illness and [address] is a 74 y.o. male patient of No primary care provider on file. who  has a past medical history of Arthritis, Atrial fibrillation ([address] Diabetes mellitus type 2, noninsulin dependent ([facility] [address] Dyspnea, History of cardiovascular stress test, History of echocardiogram, Hyperlipidemia, Hypertension, and Preoperative clearance. who originally had concerns including Pharyngitis; Tinnitus (both ears); and Chest Pain (right side ). at presentation on 2019, and was found to have Dyspnea [R06.00] after workup. The patient was seen in the emergency room with new onset dyspnea with activities along with vague chest discomfort. The dyspnea started about 3 weeks ago with the patient have an associated sore throat and congestion. Patient also complained of being lightheaded off and on. The chest pain was worse with activity. Patient also complains of urinary hesitancy and dribbling at times the patient does follow up with the [address] [place] [address]. The ER physician called his cardiologist and recommended admission.     The chest x-ray and the CT of the soft tissue neck without contrast was unremarkable. CT of the neck was done because of possible epiglottitis. The BUN/creatinine was 20/1.3 on admission and 28/1.5 at discharge. Patient's hemoglobin A1c was 6.4 but the patient blood sugars are in the 2 to 300s with the patient receiving IV steroids. The WBC was normal hemoglobin 14.5 but a platelet count and 91 on admission. The liver enzymes are normal. Blood pressure was essentially normal during hospitalization and mildly elevated on admission. The patient was seen by Dr. Rio Tyler and no further cardiac testing was done. The patient was put on Cleocin during hospitalization along with IV Decadron. The patient was also given a dose of IV Lasix. Patient was also seen by ENT. The patient doing better was discharged home and will follow-up with cardiology along with ENT in 2 weeks and with the [place]. Brief Physical Examination and Laboratory Data on Day of Discharge   Vitals:  /70   Pulse 64   Temp 97.8 °F (36.6 °C) (Oral)   Resp 18   Ht 6' (1.829 m)   Wt 265 lb (120.2 kg)   SpO2 98%   BMI 35.94 kg/m²   General Appearance:  awake, alert, and oriented to person, place, time, and purpose; appears stated age and cooperative; no apparent distress no labored breathing  HEENT:  NCAT; PERRL; conjunctiva pink, sclera clear  Neck:  no adenopathy, bruit, JVD, tenderness, masses, or nodules; supple, symmetrical, trachea midline, thyroid not enlarged  Lung:  Coarse decreased breath sounds to auscultation bilaterally; no use of accessory muscles; no rhonchi, rales, or crackles  Heart:  regular rate and regular rhythm with 2/6 systolic murmur, no rub, or gallop  Abdomen:  soft, nontender, nondistended; normoactive bowel sounds; no organomegaly  Extremities:  extremities normal, atraumatic, no cyanosis or edema  Musculokeletal:  no joint swelling, no muscle tenderness. ROM normal in all joints of extremities.    Neurologic:  mental status A&Ox3, thought content appropriate; CN II-XII grossly intact; sensation intact, motor strength 5/5 globally; no slurred speech    Labs  Lab Results   Component Value Date    WBC 7.6 05/08/2019    HGB 14.5 05/08/2019    HCT 41.5 05/08/2019    PLT 91 (L) 05/08/2019     (L) 05/09/2019    K 4.8 05/09/2019    CL 92 (L) 05/09/2019    CREATININE 1.5 (H) 05/09/2019    BUN 28 (H) 05/09/2019    CO2 25 05/09/2019    GLUCOSE 447 (H) 05/09/2019    ALT 30 05/08/2019    AST 25 05/08/2019    INR 1.7 12/28/2017     Lab Results   Component Value Date    INR 1.7 12/28/2017    INR 1.1 09/27/2017    INR 1.2 04/23/2012    PROTIME 19.6 (H) 12/28/2017    PROTIME 12.1 09/27/2017    PROTIME 13.3 (H) 04/23/2012      Lab Results   Component Value Date    TSH 1.740 05/08/2019     Lab Results   Component Value Date    TRIG 61 05/08/2019    TRIG 169 (H) 10/05/2018    TRIG 164 (H) 12/28/2017     Lab Results   Component Value Date    HDL 35 05/08/2019    HDL 29 10/05/2018    HDL 22 12/28/2017     Lab Results   Component Value Date    LDLCALC 62 05/08/2019    LDLCALC 51 10/05/2018    LDLCALC 41 12/28/2017     Lab Results   Component Value Date    LABA1C 6.4 (H) 05/08/2019       Pertinent Imaging  CT SOFT TISSUE NECK WO CONTRAST   Final Result   Normal CT soft tissue neck. XR CHEST STANDARD (2 VW)   Final Result   No acute cardiopulmonary disease. CT Head WO Contrast   Final Result   No CT evidence of acute intracranial abnormality. Disposition  The patient's condition is good. At this time the patient is without objective evidence of an acute process requiring continuing hospitalization or inpatient management. They are stable for discharge with outpatient follow-up. I have spoken with the patient and discussed the results of the current hospitalization, in addition to providing specific details for the plan of care and counseling regarding the diagnosis and prognosis.   The plan has been discussed in detail and they are aware of the specific conditions for emergent return, as well as the importance of follow-up.   Their questions are answered at this time and they are agreeable with the plan for discharge to home    Discharge Medications     Medication List      START taking these medications    amoxicillin-clavulanate 875-125 MG per tablet  Commonly known as:  AUGMENTIN  Take 1 tablet by mouth 2 times daily for 7 days     dexamethasone 4 MG tablet  Commonly known as:  DECADRON  Take 1 tablet by mouth 2 times daily (with meals) for 5 days        CHANGE how you take these medications    pantoprazole 20 MG tablet  Commonly known as:  PROTONIX  Take 2 tablets by mouth daily  What changed:    · how much to take  · when to take this        CONTINUE taking these medications    * albuterol sulfate  (90 Base) MCG/ACT inhaler     * albuterol (2.5 MG/3ML) 0.083% nebulizer solution  Commonly known as:  PROVENTIL     amiodarone 200 MG tablet  Commonly known as:  CORDARONE     aspirin 81 MG EC tablet     cetirizine 10 MG tablet  Commonly known as:  ZYRTEC     fluticasone 50 MCG/ACT nasal spray  Commonly known as:  FLONASE     furosemide 20 MG tablet  Commonly known as:  LASIX  Take 1 tablet by mouth daily     glipiZIDE 10 MG tablet  Commonly known as:  GLUCOTROL  Take 1 tablet by mouth daily     Glucose 1 g Chew     insulin glargine 100 UNIT/ML injection vial  Commonly known as:  LANTUS     lisinopril 20 MG tablet  Commonly known as:  PRINIVIL;ZESTRIL     meloxicam 15 MG tablet  Commonly known as:  MOBIC  Take 1 tablet by mouth daily     metFORMIN 1000 MG tablet  Commonly known as:  GLUCOPHAGE     metoprolol succinate 50 MG extended release tablet  Commonly known as:  TOPROL XL     NOVOLOG FLEXPEN 100 UNIT/ML injection pen  Generic drug:  insulin aspart     polyethylene glycol powder  Commonly known as:  GLYCOLAX     pravastatin 40 MG tablet  Commonly known as:  PRAVACHOL     rivaroxaban 20 MG Tabs tablet  Commonly known as:  XARELTO  Take 1 tablet by mouth Daily with supper SYMBICORT 160-4.5 MCG/ACT Aero  Generic drug:  budesonide-formoterol     tamsulosin 0.4 MG capsule  Commonly known as:  FLOMAX     therapeutic multivitamin-minerals tablet     vitamin D 2000 units Caps capsule         * This list has 2 medication(s) that are the same as other medications prescribed for you. Read the directions carefully, and ask your doctor or other care provider to review them with you. Where to Get Your Medications      You can get these medications from any pharmacy    Bring a paper prescription for each of these medications  · amoxicillin-clavulanate 875-125 MG per tablet  · dexamethasone 4 MG tablet         Follow-up / Instructions  · This patient is instructed to follow-up with his primary care physician within 7 days. · Patient is instructed to follow-up with the consults listed above as directed by them. · he is instructed to resume home medications and take new medications as indicated in the list above. · If the patient has a recurrence of symptoms, he is instructed to go to the ED. Preparing for this patient's discharge, including paperwork, orders, instructions, and meeting with patient required > 30 minutes.     Nalini Alberts DO  2:31 PM  5/9/2019

## 2019-05-09 NOTE — PLAN OF CARE
Problem: Falls - Risk of:  Goal: Will remain free from falls  Description  Will remain free from falls  Outcome: Met This Shift  Goal: Absence of physical injury  Description  Absence of physical injury  Outcome: Met This Shift     Problem: Breathing Pattern - Ineffective:  Goal: Ability to achieve and maintain a regular respiratory rate will improve  Description  Ability to achieve and maintain a regular respiratory rate will improve  Outcome: Met This Shift     Problem: Cardiac:  Goal: Ability to maintain vital signs within normal range will improve  Description  Ability to maintain vital signs within normal range will improve  Outcome: Met This Shift  Goal: Cardiovascular alteration will improve  Description  Cardiovascular alteration will improve  Outcome: Met This Shift     Problem: Serum Glucose Level - Abnormal:  Goal: Ability to maintain appropriate glucose levels has stabilized  Description  Ability to maintain appropriate glucose levels has stabilized  Outcome: Met This Shift

## 2019-05-09 NOTE — PLAN OF CARE
Problem: Falls - Risk of:  Goal: Will remain free from falls  Description  Will remain free from falls  5/9/2019 1156 by Fany Martinez RN  Outcome: Met This Shift     Problem: Breathing Pattern - Ineffective:  Goal: Ability to achieve and maintain a regular respiratory rate will improve  Description  Ability to achieve and maintain a regular respiratory rate will improve  5/9/2019 1156 by Fany Martinez RN  Outcome: Met This Shift     Problem: Cardiac:  Goal: Ability to maintain vital signs within normal range will improve  Description  Ability to maintain vital signs within normal range will improve  5/9/2019 1156 by Fany Martinez RN  Outcome: Met This Shift     Problem: Physical Regulation:  Goal: Complications related to the disease process, condition or treatment will be avoided or minimized  Description  Complications related to the disease process, condition or treatment will be avoided or minimized  Outcome: Met This Shift

## 2019-05-09 NOTE — PROGRESS NOTES
Patient is seen for dyspnea     Subjective:     Mr. Yvette Broussard   States he is feeling better, still complaining of some dyspnea at rest and with exertion.    Sitting up in bed, in no acute distress     ROS:  CONSTITUTIONAL:  negative for  fevers, chills  HEENT:  negative for earaches, nasal congestion and epistaxis  RESPIRATORY:  negative for  dry cough, cough with sputum,wheezing and hemoptysis  GASTROINTESTINAL:  negative for nausea, vomiting  MUSCULOSKELETAL:  negative for  myalgias, arthralgias  NEUROLOGICAL:  negative for visual disturbance, dysphagia    Medication side effects: none    Scheduled Meds:   metoprolol succinate  50 mg Oral Nightly    sodium chloride flush  10 mL Intravenous 2 times per day    pravastatin  20 mg Oral Nightly    therapeutic multivitamin-minerals  1 tablet Oral Nightly    fluticasone  1 spray Nasal BID    furosemide  20 mg Oral Daily    polyethylene glycol  17 g Oral Daily    lisinopril  20 mg Oral Daily    tamsulosin  0.4 mg Oral Nightly    amiodarone  200 mg Oral Daily    aspirin  81 mg Oral Nightly    mometasone-formoterol  2 puff Inhalation BID    cetirizine  10 mg Oral Daily    vitamin D  2,000 Units Oral Daily    pantoprazole  40 mg Oral Daily    insulin glargine  36 Units Subcutaneous Nightly    glipiZIDE  10 mg Oral Daily    rivaroxaban  20 mg Oral Dinner    meloxicam  15 mg Oral Daily    insulin lispro  0-12 Units Subcutaneous TID WC    insulin lispro  0-6 Units Subcutaneous Nightly     Continuous Infusions:   dextrose       PRN Meds:sodium chloride flush, acetaminophen, albuterol sulfate HFA, albuterol, glucose, dextrose, glucagon (rDNA), dextrose      Objective:      Physical Exam:   /70   Pulse 64   Temp 97.8 °F (36.6 °C) (Oral)   Resp 18   Ht 6' (1.829 m)   Wt 265 lb (120.2 kg)   SpO2 98%   BMI 35.94 kg/m²   CONSTITUTIONAL:  awake, alert, cooperative, no apparent distress, and appears stated age  HEAD:  normocepalic, without obvious abnormality, atraumatic, pink, moist mucous membranes. NECK:  Supple, symmetrical, trachea midline  LUNGS:  No increased work of breathing, diminished air exchange, clear to auscultation bilaterally, no crackles or wheezing  CARDIOVASCULAR:  Normal apical impulse, regular rate and rhythm, normal S1 and S2, no S3 or S4, and distant heart HYLQHB, 1/2 systolic murmur noted and no JVD, no carotid bruit, trace pedal edema, good carotid upstroke bilaterally. ABDOMEN:  Soft, nontender, no masses, no hepatomegaly or splenomegaly, BS+  CHEST: nontender to palpation, expands symmetrically  MUSCULOSKELETAL:  No clubbing no cyanosis. there is no redness, warmth, or swelling of the joints  NEUROLOGIC:  Alert, awake,oriented x3  SKIN:  no bruising or bleeding, normal skin color, texture, turgor and no redness, warmth, or swelling      Cardiographics  I personally reviewed the telemetry monitor strips with the following interpretation: sinus rhythm     Echocardiogram:10/11/18    Summary   Compared to prior echo, changes noted. Technically adequate study.   Moderate concentric left ventricular hypertrophy.   Moderate concentric left ventricular hypertrophy.   Ejection fraction is visually estimated at 55%.   No regional wall motion abnormalities seen.  Uma Live is doppler evidence of stage II diastolic dysfunction.   Physiologic and/or trace mitral regurgitation is present.   Physiologic and/or trace tricuspid regurgitation.  RVSP is 39 mmHg.   Pulmonary hypertension is mild .             Imaging  CT SOFT TISSUE NECK WO CONTRAST   Final Result   Normal CT soft tissue neck. XR CHEST STANDARD (2 VW)   Final Result   No acute cardiopulmonary disease. CT Head WO Contrast   Final Result   No CT evidence of acute intracranial abnormality.              Lab Review   Lab Results   Component Value Date     05/08/2019    K 4.9 05/08/2019    K 4.5 05/07/2019     05/08/2019    CO2 24 05/08/2019    BUN 20 05/08/2019 information, assessment and treatment plan and status of the problem list as documented and have edited it. NOTE: This report was transcribed using voice recognition software.  Every effort was made to ensure accuracy; however, inadvertent computerized transcription errors may be present

## 2019-05-09 NOTE — PROGRESS NOTES
[facility] Quality Flow/Interdisciplinary Rounds Progress Note        Quality Flow Rounds held on May 9, 2019    Disciplines Attending:  Bedside Nurse, ,  and Nursing Unit Leadership    Izabel Verma was admitted on 5/7/2019 10:10 AM    Anticipated Discharge Date:  Expected Discharge Date: 05/10/19    Disposition:    Tim Score:  Tim Scale Score: 21    Readmission Risk              Risk of Unplanned Readmission:        18           Discussed patient goal for the day, patient clinical progression, and barriers to discharge.   The following Goal(s) of the Day/Commitment(s) have been identified:  Activity progression, OBS, SOB, CP, prompt for DC      William Guthrie  May 9, 2019

## 2019-05-09 NOTE — PROGRESS NOTES
Patient follows with Dr. Natty Hicks at the [place] and said he will make an appointment for next week.

## 2019-11-22 ENCOUNTER — APPOINTMENT (OUTPATIENT)
Dept: CT IMAGING | Age: 74
End: 2019-11-22
Payer: MEDICARE

## 2019-11-22 ENCOUNTER — HOSPITAL ENCOUNTER (EMERGENCY)
Age: 74
Discharge: HOME | End: 2019-11-22
Attending: EMERGENCY MEDICINE
Payer: MEDICARE

## 2019-11-22 ENCOUNTER — APPOINTMENT (OUTPATIENT)
Dept: GENERAL RADIOLOGY | Age: 74
End: 2019-11-22
Payer: MEDICARE

## 2019-11-22 VITALS
OXYGEN SATURATION: 96 % | WEIGHT: 271 LBS | DIASTOLIC BLOOD PRESSURE: 78 MMHG | HEART RATE: 74 BPM | RESPIRATION RATE: 20 BRPM | SYSTOLIC BLOOD PRESSURE: 127 MMHG | BODY MASS INDEX: 36.7 KG/M2 | TEMPERATURE: 98.6 F | HEIGHT: 72 IN

## 2019-11-22 DIAGNOSIS — R07.89 CHEST WALL PAIN: Primary | ICD-10-CM

## 2019-11-22 LAB
ALBUMIN SERPL-MCNC: 4.6 G/DL (ref 3.5–5.2)
ALP BLD-CCNC: 54 U/L (ref 40–129)
ALT SERPL-CCNC: 58 U/L (ref 0–40)
ANION GAP SERPL CALCULATED.3IONS-SCNC: 16 MMOL/L (ref 7–16)
AST SERPL-CCNC: 35 U/L (ref 0–39)
BASOPHILS ABSOLUTE: 0 E9/L (ref 0–0.2)
BASOPHILS RELATIVE PERCENT: 0.6 % (ref 0–2)
BILIRUB SERPL-MCNC: 0.7 MG/DL (ref 0–1.2)
BUN BLDV-MCNC: 14 MG/DL (ref 8–23)
CALCIUM SERPL-MCNC: 9.3 MG/DL (ref 8.6–10.2)
CHLORIDE BLD-SCNC: 98 MMOL/L (ref 98–107)
CO2: 25 MMOL/L (ref 22–29)
CREAT SERPL-MCNC: 1.5 MG/DL (ref 0.7–1.2)
EOSINOPHILS ABSOLUTE: 0 E9/L (ref 0.05–0.5)
EOSINOPHILS RELATIVE PERCENT: 1.1 % (ref 0–6)
GFR AFRICAN AMERICAN: 55
GFR NON-AFRICAN AMERICAN: 45 ML/MIN/1.73
GLUCOSE BLD-MCNC: 93 MG/DL (ref 74–99)
HCT VFR BLD CALC: 47.6 % (ref 37–54)
HEMOGLOBIN: 16.5 G/DL (ref 12.5–16.5)
LYMPHOCYTES ABSOLUTE: 2.85 E9/L (ref 1.5–4)
LYMPHOCYTES RELATIVE PERCENT: 32.2 % (ref 20–42)
MCH RBC QN AUTO: 33.3 PG (ref 26–35)
MCHC RBC AUTO-ENTMCNC: 34.7 % (ref 32–34.5)
MCV RBC AUTO: 96 FL (ref 80–99.9)
METAMYELOCYTES RELATIVE PERCENT: 0.9 % (ref 0–1)
MONOCYTES ABSOLUTE: 0.53 E9/L (ref 0.1–0.95)
MONOCYTES RELATIVE PERCENT: 6.1 % (ref 2–12)
NEUTROPHILS ABSOLUTE: 5.52 E9/L (ref 1.8–7.3)
NEUTROPHILS RELATIVE PERCENT: 60.9 % (ref 43–80)
PDW BLD-RTO: 12.8 FL (ref 11.5–15)
PLATELET # BLD: 96 E9/L (ref 130–450)
PLATELET CONFIRMATION: NORMAL
PMV BLD AUTO: 9.6 FL (ref 7–12)
POTASSIUM SERPL-SCNC: 4.2 MMOL/L (ref 3.5–5)
PRO-BNP: 41 PG/ML (ref 0–450)
RBC # BLD: 4.96 E12/L (ref 3.8–5.8)
SODIUM BLD-SCNC: 139 MMOL/L (ref 132–146)
TOTAL PROTEIN: 7.4 G/DL (ref 6.4–8.3)
TROPONIN: <0.01 NG/ML (ref 0–0.03)
WBC # BLD: 8.9 E9/L (ref 4.5–11.5)

## 2019-11-22 PROCEDURE — 6370000000 HC RX 637 (ALT 250 FOR IP): Performed by: STUDENT IN AN ORGANIZED HEALTH CARE EDUCATION/TRAINING PROGRAM

## 2019-11-22 PROCEDURE — 80053 COMPREHEN METABOLIC PANEL: CPT

## 2019-11-22 PROCEDURE — 84484 ASSAY OF TROPONIN QUANT: CPT

## 2019-11-22 PROCEDURE — 83880 ASSAY OF NATRIURETIC PEPTIDE: CPT

## 2019-11-22 PROCEDURE — 70450 CT HEAD/BRAIN W/O DYE: CPT

## 2019-11-22 PROCEDURE — 36415 COLL VENOUS BLD VENIPUNCTURE: CPT

## 2019-11-22 PROCEDURE — 99285 EMERGENCY DEPT VISIT HI MDM: CPT

## 2019-11-22 PROCEDURE — 85025 COMPLETE CBC W/AUTO DIFF WBC: CPT

## 2019-11-22 PROCEDURE — 71045 X-RAY EXAM CHEST 1 VIEW: CPT

## 2019-11-22 RX ORDER — ASPIRIN 325 MG
325 TABLET ORAL ONCE
Status: COMPLETED | OUTPATIENT
Start: 2019-11-22 | End: 2019-11-22

## 2019-11-22 RX ADMIN — ASPIRIN 325 MG ORAL TABLET 325 MG: 325 PILL ORAL at 17:32

## 2019-11-22 ASSESSMENT — PAIN DESCRIPTION - DESCRIPTORS: DESCRIPTORS: PRESSURE

## 2019-11-22 ASSESSMENT — PAIN DESCRIPTION - FREQUENCY: FREQUENCY: INTERMITTENT

## 2019-11-22 ASSESSMENT — ENCOUNTER SYMPTOMS
HEARTBURN: 0
CHEST TIGHTNESS: 0
ABDOMINAL PAIN: 0
BACK PAIN: 0
SHORTNESS OF BREATH: 1
PHOTOPHOBIA: 0
VOMITING: 0
COUGH: 1
NAUSEA: 1
WHEEZING: 0

## 2019-11-22 ASSESSMENT — PAIN SCALES - GENERAL: PAINLEVEL_OUTOF10: 5

## 2019-11-22 ASSESSMENT — PAIN DESCRIPTION - LOCATION: LOCATION: CHEST

## 2019-11-22 ASSESSMENT — PAIN DESCRIPTION - PAIN TYPE: TYPE: ACUTE PAIN

## 2019-11-22 ASSESSMENT — PAIN DESCRIPTION - ORIENTATION: ORIENTATION: MID

## 2019-11-26 LAB
EKG ATRIAL RATE: 76 BPM
EKG P AXIS: 42 DEGREES
EKG P-R INTERVAL: 200 MS
EKG Q-T INTERVAL: 406 MS
EKG QRS DURATION: 98 MS
EKG QTC CALCULATION (BAZETT): 456 MS
EKG R AXIS: -17 DEGREES
EKG T AXIS: 39 DEGREES
EKG VENTRICULAR RATE: 76 BPM

## 2020-07-28 NOTE — H&P
Increased fluid, worse. children: N/A    Years of education: N/A     Occupational History    Not on file. Social History Main Topics    Smoking status: Former Smoker     Packs/day: 0.50     Years: 25.00     Types: Cigarettes     Quit date: 1/1/1995    Smokeless tobacco: Never Used    Alcohol use No      Comment: heavy drinker in the past--none since 2000;  drinks 3-4 cups of coffee dialy    Drug use: No    Sexual activity: Not on file     Other Topics Concern    Not on file     Social History Narrative    No narrative on file       Vitals:  BP (!) 154/84   Pulse 79   Temp 97.8 °F (36.6 °C) (Oral)   Resp 26   Ht 6' (1.829 m)   Wt 261 lb (118.4 kg)   SpO2 97%   BMI 35.40 kg/m²     Physical Exam:  General: Awake, alert, oriented to person, place, time, and purpose, appears stated age, cooperative, no acute distress   Head: Normocephalic, atraumatic  Eyes: Conjunctivae/corneas clear, Sclera non icteric  Mouth: Mucous membranes moist  Neck: No JVD, no adenopathy, neck is supple, trachea is midline  Back: ROM normal  Lungs: Clear to auscultation bilaterally. No retractions or use of accessory muscles. No vocal fremitus. No rhonchi, crackle or rale. Heart: Regular rate and regular rhythm, no murmur, normal S1, S2  Abdomen: Soft, non-tender; bowel sounds normal; no masses, no organomegaly  Extremities: No lower extremity edema, extremities atraumatic, no cyanosis, no clubbing, 2+ pedal pulses palpated  Skin: Normal color, normal texture, normal turgor, no rashes, no lesions  Neurologic:  Normal muscle tone throughout, EOMI, face symmetric, equal facial muscle strength bilaterally, hearing intact,  tongue midline, Speech appropriate without slurring. Osteopathic/Structural Exam: Examined in seated and supine position. Normal thoracic kyphosis. Normal lumbar lordosis. No acute changes. Assessment and Plan     Patient is a 74 y.o. male who presented with chest pain. Assessment/Plan  1.  Chest pain - normal coronary

## 2020-11-03 PROBLEM — I10 HYPERTENSION: Status: RESOLVED | Noted: 2020-11-03 | Resolved: 2020-11-03

## 2020-11-03 PROBLEM — I48.91 A-FIB (HCC): Status: RESOLVED | Noted: 2017-12-27 | Resolved: 2020-11-03

## 2020-11-03 PROBLEM — R07.9 CHEST PAIN: Status: RESOLVED | Noted: 2018-10-04 | Resolved: 2020-11-03

## 2020-11-03 PROBLEM — E78.5 HYPERLIPIDEMIA: Status: RESOLVED | Noted: 2020-11-03 | Resolved: 2020-11-03

## 2020-11-03 PROBLEM — I48.91 ATRIAL FIBRILLATION (HCC): Status: RESOLVED | Noted: 2020-11-03 | Resolved: 2020-11-03

## 2022-02-25 ENCOUNTER — HOSPITAL ENCOUNTER (OUTPATIENT)
Dept: CARDIAC REHAB | Age: 77
Setting detail: THERAPIES SERIES
Discharge: HOME | End: 2022-02-25

## 2022-02-25 VITALS
HEIGHT: 72 IN | OXYGEN SATURATION: 96 % | SYSTOLIC BLOOD PRESSURE: 114 MMHG | DIASTOLIC BLOOD PRESSURE: 60 MMHG | HEART RATE: 76 BPM | RESPIRATION RATE: 22 BRPM | WEIGHT: 260 LBS | BODY MASS INDEX: 35.21 KG/M2

## 2022-02-25 ASSESSMENT — PATIENT HEALTH QUESTIONNAIRE - PHQ9
8. MOVING OR SPEAKING SO SLOWLY THAT OTHER PEOPLE COULD HAVE NOTICED. OR THE OPPOSITE, BEING SO FIGETY OR RESTLESS THAT YOU HAVE BEEN MOVING AROUND A LOT MORE THAN USUAL: 1
1. LITTLE INTEREST OR PLEASURE IN DOING THINGS: 1
SUM OF ALL RESPONSES TO PHQ QUESTIONS 1-9: 8
SUM OF ALL RESPONSES TO PHQ9 QUESTIONS 1 & 2: 1
SUM OF ALL RESPONSES TO PHQ QUESTIONS 1-9: 8
SUM OF ALL RESPONSES TO PHQ QUESTIONS 1-9: 8
5. POOR APPETITE OR OVEREATING: 3
3. TROUBLE FALLING OR STAYING ASLEEP: 1
9. THOUGHTS THAT YOU WOULD BE BETTER OFF DEAD, OR OF HURTING YOURSELF: 0
2. FEELING DOWN, DEPRESSED OR HOPELESS: 0
7. TROUBLE CONCENTRATING ON THINGS, SUCH AS READING THE NEWSPAPER OR WATCHING TELEVISION: 1
4. FEELING TIRED OR HAVING LITTLE ENERGY: 1
6. FEELING BAD ABOUT YOURSELF - OR THAT YOU ARE A FAILURE OR HAVE LET YOURSELF OR YOUR FAMILY DOWN: 0
SUM OF ALL RESPONSES TO PHQ QUESTIONS 1-9: 8

## 2022-02-25 ASSESSMENT — EJECTION FRACTION: EF_VALUE: 55

## 2022-03-01 ENCOUNTER — HOSPITAL ENCOUNTER (OUTPATIENT)
Dept: CARDIAC REHAB | Age: 77
Setting detail: THERAPIES SERIES
Discharge: HOME | End: 2022-03-01
Payer: OTHER GOVERNMENT

## 2022-03-01 PROCEDURE — 94626 PHY/QHP OP PULM RHB W/MNTR: CPT

## 2022-03-03 ENCOUNTER — HOSPITAL ENCOUNTER (OUTPATIENT)
Dept: CARDIAC REHAB | Age: 77
Setting detail: THERAPIES SERIES
Discharge: HOME | End: 2022-03-03
Payer: OTHER GOVERNMENT

## 2022-03-03 VITALS — OXYGEN SATURATION: 96 % | DIASTOLIC BLOOD PRESSURE: 62 MMHG | HEART RATE: 81 BPM | SYSTOLIC BLOOD PRESSURE: 124 MMHG

## 2022-03-03 PROCEDURE — 94626 PHY/QHP OP PULM RHB W/MNTR: CPT

## 2022-03-08 ENCOUNTER — HOSPITAL ENCOUNTER (OUTPATIENT)
Dept: CARDIAC REHAB | Age: 77
Setting detail: THERAPIES SERIES
Discharge: HOME | End: 2022-03-08
Payer: OTHER GOVERNMENT

## 2022-03-08 PROCEDURE — 94626 PHY/QHP OP PULM RHB W/MNTR: CPT

## 2022-03-10 ENCOUNTER — HOSPITAL ENCOUNTER (OUTPATIENT)
Dept: CARDIAC REHAB | Age: 77
Setting detail: THERAPIES SERIES
Discharge: HOME | End: 2022-03-10
Payer: OTHER GOVERNMENT

## 2022-03-10 PROCEDURE — 94626 PHY/QHP OP PULM RHB W/MNTR: CPT

## 2022-03-16 ENCOUNTER — TELEPHONE (OUTPATIENT)
Dept: CARDIAC REHAB | Age: 77
End: 2022-03-16

## 2022-03-16 NOTE — TELEPHONE ENCOUNTER
3/16/2022 1617 Nutrition: Attempted to reach patient on this date by phone regarding appointment for 3/17/2022 at 9:00 am. Will remain available.  Electronically signed by Sherman France RD, LD on 3/16/22 at 4:18 PM EDT

## 2022-03-17 ENCOUNTER — HOSPITAL ENCOUNTER (OUTPATIENT)
Dept: CARDIAC REHAB | Age: 77
Setting detail: THERAPIES SERIES
Discharge: HOME | End: 2022-03-17
Payer: OTHER GOVERNMENT

## 2022-03-17 VITALS — WEIGHT: 271.4 LBS | BODY MASS INDEX: 36.76 KG/M2 | HEIGHT: 72 IN

## 2022-03-17 PROCEDURE — 94626 PHY/QHP OP PULM RHB W/MNTR: CPT

## 2022-03-17 NOTE — PROGRESS NOTES
Pulmonary  Rehabilitation Nutrition Assessment       NAME: Maggie Negro : 1945 AGE: 64 y.o. GENDER: male    PULMONARY  REHAB ADMITTING DIAGNOSIS: S/P COVID-19         PROBLEM LIST:    Patient Active Problem List   Diagnosis    GERD (gastroesophageal reflux disease)    Diabetes mellitus type 2, noninsulin dependent ([address]    S/P TURP (status post transurethral resection of prostate)    Hyperglycemia    CHF (congestive heart failure) (HCC)    Acute coronary syndrome ([address]    ACS (acute coronary syndrome) ([address]    Diabetes mellitus type 2, noninsulin dependent ([address]    CAD in native artery    Dyspnea    Exertional chest pain    Hypertension    Hyperlipidemia    Atrial fibrillation ([address]    Diabetes mellitus type 2, noninsulin dependent ([address]     Pertinent past medical/surgical history: constipation and gerd      LABS:    2021 HgA1c  6.6     Lab Results   Component Value Date    CHOL 109 2019    HDL 35 2019    LDLCALC 62 2019    TRIG 61 2019    LABVLDL 12 2019    Updated labs: from external lab: 2021   Chol 117; LDL 75; HDL 29;       MEDICATIONS/SUPPLEMENTS:    Prior to Admission medications    Medication Sig Start Date End Date Taking?  Authorizing Provider   albuterol sulfate  (90 Base) MCG/ACT inhaler Inhale 2 puffs into the lungs every 6 hours as needed for Wheezing    Historical Provider, MD   albuterol (PROVENTIL) (2.5 MG/3ML) 0.083% nebulizer solution Take 2.5 mg by nebulization every 6 hours as needed for Wheezing or Shortness of Breath    Historical Provider, MD   metFORMIN (GLUCOPHAGE) 1000 MG tablet Take 1,000 mg by mouth 2 times daily (with meals)    Historical Provider, MD   meloxicam (MOBIC) 15 MG tablet Take 1 tablet by mouth daily 19   Marco Betancourt PA-C   glipiZIDE (GLUCOTROL) 10 MG tablet Take 1 tablet by mouth daily 10/12/18   Teofilo Espinoza DO   rivaroxaban (XARELTO) 20 MG TABS tablet Take 1 tablet by mouth Daily with supper 10/14/18   Valerie Barillas MD   insulin glargine (LANTUS) 100 UNIT/ML injection vial Inject 36 Units into the skin nightly    Historical Provider, MD   polyethylene glycol (GLYCOLAX) powder Take 17 g by mouth daily    Historical Provider, MD   lisinopril (PRINIVIL;ZESTRIL) 20 MG tablet Take 20 mg by mouth daily    Historical Provider, MD   tamsulosin (FLOMAX) 0.4 MG capsule Take 0.4 mg by mouth nightly    Historical Provider, MD   amiodarone (CORDARONE) 200 MG tablet Take 200 mg by mouth daily    Historical Provider, MD   aspirin 81 MG EC tablet Take 81 mg by mouth nightly     Historical Provider, MD   budesonide-formoterol (SYMBICORT) 160-4.5 MCG/ACT AERO Inhale 2 puffs into the lungs 2 times daily    Historical Provider, MD   cetirizine (ZYRTEC) 10 MG tablet Take 10 mg by mouth daily    Historical Provider, MD   Cholecalciferol (VITAMIN D) 2000 units CAPS capsule Take 2,000 Units by mouth daily    Historical Provider, MD   Dextrose, Diabetic Use, (GLUCOSE) 1 g CHEW Take 1 tablet by mouth as needed (Low Blood Sugar)     Historical Provider, MD   insulin aspart (NOVOLOG FLEXPEN) 100 UNIT/ML injection pen Inject 6 Units into the skin 3 times daily (before meals)    Historical Provider, MD   metoprolol succinate (TOPROL XL) 50 MG extended release tablet Take 25 mg by mouth nightly    Historical Provider, MD   pantoprazole (PROTONIX) 20 MG tablet Take 2 tablets by mouth daily  Patient taking differently: Take 20 mg by mouth 2 times daily  10/5/18   Areli Mahoney DO   fluticasone (FLONASE) 50 MCG/ACT nasal spray 1 spray by Nasal route 2 times daily  12/30/17   Liz Mcginnis MD   furosemide (LASIX) 20 MG tablet Take 1 tablet by mouth daily 12/30/17   Donney Barthel, APRN - CNP   pravastatin (PRAVACHOL) 40 MG tablet Take 20 mg by mouth nightly     Historical Provider, MD   therapeutic multivitamin-minerals (THERAGRAN-M) tablet Take 1 tablet by mouth nightly Last dose 12/04/2015    Historical Provider, MD       ANTHROPOMETRICS:    Ht Readings from Last 1 Encounters:   03/17/22 6' (1.829 m)      Wt Readings from Last 10 Encounters:   03/17/22 271 lb 6.4 oz (123.1 kg)   02/25/22 260 lb (117.9 kg)   11/22/19 271 lb (122.9 kg)   05/07/19 265 lb (120.2 kg)   04/22/19 267 lb (121.1 kg)   01/19/19 260 lb (117.9 kg)   11/30/18 264 lb (119.7 kg)   10/09/18 260 lb 1 oz (118 kg)   10/12/18 261 lb (118.4 kg)   10/04/18 261 lb (118.4 kg)                 IBW:178 lbs +/-10%      %IBW: 152%                 BMI: 36.9     Waist: deferred - Unable to obtain. Patient is in a wheeled walker. Reported Wt Hx:Per chart review, pulmonary rehab admit weight: 260 pounds. Patient weight today: 271 pounds. Patient gained 11 pounds over 1 month indicating 4.2% weight gain. Weight gain not beneficial. Patient reports he hurt right side of leg and awaiting xray results and reports pain and decreased activity. Reported Diet Hx:Patient is eating 3 meals and taking fluids; does not use stove; using microwave       Rate Your Plate Score:deferred    (Score 58-72: Making many healthy choices; 41-57: Some choices need improving 24-40: many choices need improving)         24 HOUR DIET RECALL    Breakfast: 7:00 am, at home, 1 bowl cereal ( cold cereal- shredded wheat with whole milk) and 1 cup regular coffee with whole milk  With 2 spoons sugar     Lunch: 12:30 pm, at home, 1 breakfast meal ( [employer]) 1 cup regular coffee with whole milk with 2 spoons sugar      Dinner: 3:30 pm to 4:00 pm, at home, 2 hot dogs, no bun, plain with 1 cup commercial mac and cheese, 1 cab regular Pepsi    Drank some water with medications.      Snacks: no snacks      Beverages: drinks regular Pepsi at times; drinks whole milk;  3 cups regular coffee; drinks water- does not know amount       Leann Azul         Environmental/Social:  Patient uses wheeled walker- patient cooks breakfast; daughter does meal prep for lunch and dinner; daughter grocery shops; patient reports decreased appetite for a few years since wife . No alcoholic beverages. No cultural, Anglican or ethnic food preferences. Patient does not talk on any phone. No chewing and no swallowing problems. Edentulous. Has dentures but not using because do not fit. Daughter takes patient out every few days; Likes eggs out; likes [employer]. LIkes to go to restaurants when out with daughter traveling out of state [place])  Has adequate funds for food. NUTRITION INTERVENTION:    Nutrition 30 / 60 minute one-on-one education & goal setting with [facility] with Mirna Means relevant labs compared to ideals. Reviewed weight history and patient's verbalized weight goal as well as any real or perceived barriers to obtaining the goal. Collaborated with patient to set a specific short and long term weight goal.         Conducted a verbal diet recall. Assessed for environmental, financial, psychosocial, physical and comorbidities that may impact the food and eating patterns / behaviors of Brooke S[place]ia with patient to set specific nutrient goals as well as specific food / behavior changes that will help patient meet the overall goal of following a heart healthy eating pattern (using guidelines as set forth by the American Heart Association and modeled after healthful eating patterns as recognized by the USDA Dietary Guidelines such as DASH, Mediterranean or plant-based). Briefly reviewed with Mirna Means the nutrition information:  and encouraged Mirna Means to read  My Plate, Dietary Resolutions, 3 day food log, effects of weigh on breathing and MNT Heart Healthy Consistent Carbohydrate information and to read information thoroughly, ask questions as needed, and use for future reference for heart healthy nutrition information. Mirna Means  is not scheduled to participate in Cardiac Rehab group nutrition classes.        PATIENT GOALS:       Weight Goals:Patient will try to lose 1 to 2 pounds every month. Short Term Weight Goal:268 lbs    Long Term Weight Goal: 250  lbs         Nutrition Goals:Patient will try to follow My Plate dietary guidelines. Daily Recommendations:Patient will eat at least 3 meals and 1 -2 snacks per day to meet estimated needs. Calories: 2600 calories /day minus calories for weight loss     (using [address] with( (1995)  AF 1.3)    Estimated Total Protein needs: 65- 81 grams/day ( based on 0.8 to one gram/kg IBW). Estimated Total Fluid needs: 3000- 3600 ml/day ( based on 25 to 30 ml/kg actual body weight)     Saturated Fat: no more than 20 g/day    Trans Fat: 0 g/day    Sodium: no more than 1500  mg/day    Fruit: 3 cups / day    Vegetables: 2 cups/day         Other:    - read and compare food labels    -Patient will increase water consumption.    -Patient will use less sugar in coffee. ( patient refuses to use artificial sweeteners or use sugar substitute or eat low fat foods). -Patient will have evening snack. Keeping a food diary was recommended. Patient is a phase II participant and ITP done. Questions addressed. Follow-up plans discussed Patient scheduled for follow up on April 14, 2022. Contact information provided. Lele Davalos verbalized understanding. Itzel Jackman MA, RDN, LD  Contact phone: [phone].

## 2022-03-22 ENCOUNTER — HOSPITAL ENCOUNTER (OUTPATIENT)
Dept: CARDIAC REHAB | Age: 77
Setting detail: THERAPIES SERIES
Discharge: HOME | End: 2022-03-22
Payer: OTHER GOVERNMENT

## 2022-03-22 PROCEDURE — 94626 PHY/QHP OP PULM RHB W/MNTR: CPT

## 2022-03-24 ENCOUNTER — HOSPITAL ENCOUNTER (OUTPATIENT)
Dept: CARDIAC REHAB | Age: 77
Setting detail: THERAPIES SERIES
Discharge: HOME | End: 2022-03-24
Payer: OTHER GOVERNMENT

## 2022-03-24 PROCEDURE — 94626 PHY/QHP OP PULM RHB W/MNTR: CPT

## 2022-03-24 ASSESSMENT — PATIENT HEALTH QUESTIONNAIRE - PHQ9
8. MOVING OR SPEAKING SO SLOWLY THAT OTHER PEOPLE COULD HAVE NOTICED. OR THE OPPOSITE, BEING SO FIGETY OR RESTLESS THAT YOU HAVE BEEN MOVING AROUND A LOT MORE THAN USUAL: 0
7. TROUBLE CONCENTRATING ON THINGS, SUCH AS READING THE NEWSPAPER OR WATCHING TELEVISION: 3
9. THOUGHTS THAT YOU WOULD BE BETTER OFF DEAD, OR OF HURTING YOURSELF: 0
2. FEELING DOWN, DEPRESSED OR HOPELESS: 0
5. POOR APPETITE OR OVEREATING: 3
SUM OF ALL RESPONSES TO PHQ QUESTIONS 1-9: 6
6. FEELING BAD ABOUT YOURSELF - OR THAT YOU ARE A FAILURE OR HAVE LET YOURSELF OR YOUR FAMILY DOWN: 0
SUM OF ALL RESPONSES TO PHQ QUESTIONS 1-9: 6
SUM OF ALL RESPONSES TO PHQ QUESTIONS 1-9: 6
1. LITTLE INTEREST OR PLEASURE IN DOING THINGS: 0
4. FEELING TIRED OR HAVING LITTLE ENERGY: 0
SUM OF ALL RESPONSES TO PHQ QUESTIONS 1-9: 6
3. TROUBLE FALLING OR STAYING ASLEEP: 0
SUM OF ALL RESPONSES TO PHQ9 QUESTIONS 1 & 2: 0

## 2022-04-07 ENCOUNTER — HOSPITAL ENCOUNTER (OUTPATIENT)
Dept: CARDIAC REHAB | Age: 77
Setting detail: THERAPIES SERIES
Discharge: HOME | End: 2022-04-07
Payer: OTHER GOVERNMENT

## 2022-04-07 PROCEDURE — 94626 PHY/QHP OP PULM RHB W/MNTR: CPT

## 2022-04-12 ENCOUNTER — HOSPITAL ENCOUNTER (OUTPATIENT)
Dept: CARDIAC REHAB | Age: 77
Setting detail: THERAPIES SERIES
End: 2022-04-12
Payer: OTHER GOVERNMENT

## 2022-04-13 ENCOUNTER — TELEPHONE (OUTPATIENT)
Dept: CARDIAC REHAB | Age: 77
End: 2022-04-13

## 2022-04-13 NOTE — TELEPHONE ENCOUNTER
4/13/2022 1426 Nutrition: Spoke with patient daughter, Leila Dakins, on this date by phone confirming appointment for 4/14/2022 at 9:00 am. Daughter reports patient appointment has to be cancelled due to transportation issue and daughter work schedule. Daughter transports patient. Will reschedule for later date. Daughter reports patient is eating and taking fluids. Will remain available.  Electronically signed by Haydee Ramirez RD, LD on 4/13/22 at 2:28 PM EDT

## 2022-04-14 ENCOUNTER — HOSPITAL ENCOUNTER (OUTPATIENT)
Dept: CARDIAC REHAB | Age: 77
Setting detail: THERAPIES SERIES
Discharge: HOME | End: 2022-04-14
Payer: OTHER GOVERNMENT

## 2022-04-14 PROCEDURE — 94626 PHY/QHP OP PULM RHB W/MNTR: CPT

## 2022-04-19 ENCOUNTER — HOSPITAL ENCOUNTER (OUTPATIENT)
Dept: CARDIAC REHAB | Age: 77
Setting detail: THERAPIES SERIES
Discharge: HOME | End: 2022-04-19
Payer: OTHER GOVERNMENT

## 2022-04-19 PROCEDURE — 94626 PHY/QHP OP PULM RHB W/MNTR: CPT

## 2022-04-20 ENCOUNTER — TELEPHONE (OUTPATIENT)
Dept: CARDIAC REHAB | Age: 77
End: 2022-04-20

## 2022-04-20 NOTE — TELEPHONE ENCOUNTER
4/20/2022 1705 Nutrition: Spoke with patient daughter, Frank Hunt, on this date by phone r, egarding appointment for 4/21/2022 at 11:00 am. Daughter cancelling appointment for tomorrow. Will remain available if needed.  Electronically signed by Ameena Arce RD, LD on 4/20/22 at 5:06 PM EDT

## 2022-04-21 ENCOUNTER — HOSPITAL ENCOUNTER (OUTPATIENT)
Dept: CARDIAC REHAB | Age: 77
Setting detail: THERAPIES SERIES
Discharge: HOME | End: 2022-04-21
Payer: OTHER GOVERNMENT

## 2022-04-21 PROCEDURE — 94626 PHY/QHP OP PULM RHB W/MNTR: CPT

## 2022-04-26 ENCOUNTER — APPOINTMENT (OUTPATIENT)
Dept: CARDIAC REHAB | Age: 77
End: 2022-04-26
Payer: OTHER GOVERNMENT

## 2022-04-28 ENCOUNTER — HOSPITAL ENCOUNTER (OUTPATIENT)
Dept: CARDIAC REHAB | Age: 77
Setting detail: THERAPIES SERIES
End: 2022-04-28
Payer: OTHER GOVERNMENT

## 2022-05-03 ENCOUNTER — HOSPITAL ENCOUNTER (OUTPATIENT)
Dept: CARDIAC REHAB | Age: 77
Setting detail: THERAPIES SERIES
End: 2022-05-03
Payer: MEDICARE

## 2022-05-05 ENCOUNTER — HOSPITAL ENCOUNTER (OUTPATIENT)
Dept: CARDIAC REHAB | Age: 77
Setting detail: THERAPIES SERIES
End: 2022-05-05
Payer: MEDICARE

## 2022-05-10 ENCOUNTER — HOSPITAL ENCOUNTER (OUTPATIENT)
Dept: CARDIAC REHAB | Age: 77
Setting detail: THERAPIES SERIES
Discharge: HOME | End: 2022-05-10
Payer: MEDICARE

## 2022-05-10 PROCEDURE — 94626 PHY/QHP OP PULM RHB W/MNTR: CPT

## 2022-05-10 ASSESSMENT — PATIENT HEALTH QUESTIONNAIRE - PHQ9
2. FEELING DOWN, DEPRESSED OR HOPELESS: 0
4. FEELING TIRED OR HAVING LITTLE ENERGY: 0
SUM OF ALL RESPONSES TO PHQ QUESTIONS 1-9: 4
SUM OF ALL RESPONSES TO PHQ9 QUESTIONS 1 & 2: 2
5. POOR APPETITE OR OVEREATING: 2
7. TROUBLE CONCENTRATING ON THINGS, SUCH AS READING THE NEWSPAPER OR WATCHING TELEVISION: 0
1. LITTLE INTEREST OR PLEASURE IN DOING THINGS: 2
9. THOUGHTS THAT YOU WOULD BE BETTER OFF DEAD, OR OF HURTING YOURSELF: 0
8. MOVING OR SPEAKING SO SLOWLY THAT OTHER PEOPLE COULD HAVE NOTICED. OR THE OPPOSITE, BEING SO FIGETY OR RESTLESS THAT YOU HAVE BEEN MOVING AROUND A LOT MORE THAN USUAL: 0
6. FEELING BAD ABOUT YOURSELF - OR THAT YOU ARE A FAILURE OR HAVE LET YOURSELF OR YOUR FAMILY DOWN: 0
3. TROUBLE FALLING OR STAYING ASLEEP: 0

## 2022-05-12 ENCOUNTER — HOSPITAL ENCOUNTER (OUTPATIENT)
Dept: CARDIAC REHAB | Age: 77
Setting detail: THERAPIES SERIES
End: 2022-05-12
Payer: MEDICARE

## 2022-05-24 ENCOUNTER — HOSPITAL ENCOUNTER (OUTPATIENT)
Dept: CARDIAC REHAB | Age: 77
Setting detail: THERAPIES SERIES
End: 2022-05-24
Payer: MEDICARE

## 2022-05-26 ENCOUNTER — HOSPITAL ENCOUNTER (OUTPATIENT)
Dept: CARDIAC REHAB | Age: 77
Setting detail: THERAPIES SERIES
Discharge: HOME | End: 2022-05-26
Payer: MEDICARE

## 2022-05-26 ENCOUNTER — APPOINTMENT (OUTPATIENT)
Dept: CARDIAC REHAB | Age: 77
End: 2022-05-26
Payer: MEDICARE

## 2022-05-31 ENCOUNTER — APPOINTMENT (OUTPATIENT)
Dept: CARDIAC REHAB | Age: 77
End: 2022-05-31
Payer: MEDICARE

## 2023-07-23 ENCOUNTER — APPOINTMENT (OUTPATIENT)
Dept: GENERAL RADIOLOGY | Age: 78
End: 2023-07-23
Payer: OTHER GOVERNMENT

## 2023-07-23 ENCOUNTER — HOSPITAL ENCOUNTER (OUTPATIENT)
Age: 78
Setting detail: OBSERVATION
Discharge: HOME | End: 2023-07-26
Attending: EMERGENCY MEDICINE | Admitting: INTERNAL MEDICINE
Payer: OTHER GOVERNMENT

## 2023-07-23 DIAGNOSIS — R77.8 ELEVATED TROPONIN: ICD-10-CM

## 2023-07-23 DIAGNOSIS — R07.9 CHEST PAIN, UNSPECIFIED TYPE: Primary | ICD-10-CM

## 2023-07-23 LAB
ALBUMIN SERPL-MCNC: 4.2 G/DL (ref 3.5–5.2)
ALP SERPL-CCNC: 64 U/L (ref 40–129)
ALT SERPL-CCNC: 19 U/L (ref 0–40)
ANION GAP SERPL CALCULATED.3IONS-SCNC: 11 MMOL/L (ref 7–16)
AST SERPL-CCNC: 34 U/L (ref 0–39)
BASOPHILS # BLD: 0.06 K/UL (ref 0–0.2)
BASOPHILS NFR BLD: 1 % (ref 0–2)
BILIRUB SERPL-MCNC: 0.6 MG/DL (ref 0–1.2)
BNP SERPL-MCNC: 162 PG/ML (ref 0–450)
BUN SERPL-MCNC: 29 MG/DL (ref 6–23)
CALCIUM SERPL-MCNC: 9.3 MG/DL (ref 8.6–10.2)
CHLORIDE SERPL-SCNC: 100 MMOL/L (ref 98–107)
CO2 SERPL-SCNC: 23 MMOL/L (ref 22–29)
CREAT SERPL-MCNC: 1.9 MG/DL (ref 0.7–1.2)
EKG ATRIAL RATE: 70 BPM
EKG P AXIS: 38 DEGREES
EKG P-R INTERVAL: 244 MS
EKG Q-T INTERVAL: 420 MS
EKG QRS DURATION: 94 MS
EKG QTC CALCULATION (BAZETT): 453 MS
EKG R AXIS: -17 DEGREES
EKG T AXIS: 60 DEGREES
EKG VENTRICULAR RATE: 70 BPM
EOSINOPHIL # BLD: 0.12 K/UL (ref 0.05–0.5)
EOSINOPHILS RELATIVE PERCENT: 2 % (ref 0–6)
ERYTHROCYTE [DISTWIDTH] IN BLOOD BY AUTOMATED COUNT: 14.6 % (ref 11.5–15)
GFR SERPL CREATININE-BSD FRML MDRD: 36 ML/MIN/1.73M2
GLUCOSE SERPL-MCNC: 192 MG/DL (ref 74–99)
HCT VFR BLD AUTO: 39.2 % (ref 37–54)
HGB BLD-MCNC: 11.7 G/DL (ref 12.5–16.5)
LYMPHOCYTES NFR BLD: 2.41 K/UL (ref 1.5–4)
LYMPHOCYTES RELATIVE PERCENT: 35 % (ref 20–42)
MCH RBC QN AUTO: 25.4 PG (ref 26–35)
MCHC RBC AUTO-ENTMCNC: 29.8 G/DL (ref 32–34.5)
MCV RBC AUTO: 85.2 FL (ref 80–99.9)
METER GLUCOSE: 280 MG/DL (ref 74–99)
METER GLUCOSE: 309 MG/DL (ref 74–99)
MONOCYTES NFR BLD: 0.3 K/UL (ref 0.1–0.95)
MONOCYTES NFR BLD: 4 % (ref 2–12)
NEUTROPHILS NFR BLD: 58 % (ref 43–80)
NEUTS SEG NFR BLD: 3.91 K/UL (ref 1.8–7.3)
PLATELET # BLD AUTO: 83 K/UL (ref 130–450)
PLATELET CONFIRMATION: NORMAL
PMV BLD AUTO: 10.1 FL (ref 7–12)
POTASSIUM SERPL-SCNC: 5.4 MMOL/L (ref 3.5–5)
PROT SERPL-MCNC: 7 G/DL (ref 6.4–8.3)
RBC # BLD AUTO: 4.6 M/UL (ref 3.8–5.8)
RBC # BLD: NORMAL 10*6/UL
REASON FOR REJECTION: NORMAL
SODIUM SERPL-SCNC: 134 MMOL/L (ref 132–146)
SPECIMEN SOURCE: NORMAL
TROPONIN I SERPL HS-MCNC: 19 NG/L (ref 0–11)
TROPONIN I SERPL HS-MCNC: 19 NG/L (ref 0–11)
WBC OTHER # BLD: 6.8 K/UL (ref 4.5–11.5)
ZZ NTE CLEAN UP: ORDERED TEST: NORMAL

## 2023-07-23 PROCEDURE — 93005 ELECTROCARDIOGRAM TRACING: CPT

## 2023-07-23 PROCEDURE — 6360000002 HC RX W HCPCS

## 2023-07-23 PROCEDURE — 71045 X-RAY EXAM CHEST 1 VIEW: CPT

## 2023-07-23 PROCEDURE — 6370000000 HC RX 637 (ALT 250 FOR IP)

## 2023-07-23 PROCEDURE — 85027 COMPLETE CBC AUTOMATED: CPT

## 2023-07-23 PROCEDURE — 80053 COMPREHEN METABOLIC PANEL: CPT

## 2023-07-23 PROCEDURE — 93010 ELECTROCARDIOGRAM REPORT: CPT | Performed by: INTERNAL MEDICINE

## 2023-07-23 PROCEDURE — 6360000002 HC RX W HCPCS: Performed by: EMERGENCY MEDICINE

## 2023-07-23 PROCEDURE — 83880 ASSAY OF NATRIURETIC PEPTIDE: CPT

## 2023-07-23 PROCEDURE — 82947 ASSAY GLUCOSE BLOOD QUANT: CPT

## 2023-07-23 PROCEDURE — 94640 AIRWAY INHALATION TREATMENT: CPT

## 2023-07-23 PROCEDURE — 2580000003 HC RX 258: Performed by: INTERNAL MEDICINE

## 2023-07-23 PROCEDURE — G0378 HOSPITAL OBSERVATION PER HR: HCPCS

## 2023-07-23 PROCEDURE — 84484 ASSAY OF TROPONIN QUANT: CPT

## 2023-07-23 PROCEDURE — 96376 TX/PRO/DX INJ SAME DRUG ADON: CPT

## 2023-07-23 PROCEDURE — 96374 THER/PROPH/DIAG INJ IV PUSH: CPT

## 2023-07-23 PROCEDURE — 6360000002 HC RX W HCPCS: Performed by: INTERNAL MEDICINE

## 2023-07-23 PROCEDURE — 6370000000 HC RX 637 (ALT 250 FOR IP): Performed by: INTERNAL MEDICINE

## 2023-07-23 PROCEDURE — 99285 EMERGENCY DEPT VISIT HI MDM: CPT

## 2023-07-23 RX ORDER — BUDESONIDE AND FORMOTEROL FUMARATE DIHYDRATE 160; 4.5 UG/1; UG/1
2 AEROSOL RESPIRATORY (INHALATION) 2 TIMES DAILY
Status: DISCONTINUED | OUTPATIENT
Start: 2023-07-23 | End: 2023-07-23 | Stop reason: CLARIF

## 2023-07-23 RX ORDER — BENZONATATE 100 MG/1
100 CAPSULE ORAL 3 TIMES DAILY PRN
COMMUNITY

## 2023-07-23 RX ORDER — CARVEDILOL 12.5 MG/1
18.75 TABLET ORAL 2 TIMES DAILY WITH MEALS
Status: ON HOLD | COMMUNITY
End: 2023-07-25 | Stop reason: HOSPADM

## 2023-07-23 RX ORDER — ACETAMINOPHEN 325 MG/1
325 TABLET ORAL EVERY 6 HOURS PRN
COMMUNITY

## 2023-07-23 RX ORDER — SODIUM CHLORIDE 0.9 % (FLUSH) 0.9 %
5-40 SYRINGE (ML) INJECTION EVERY 12 HOURS SCHEDULED
Status: DISCONTINUED | OUTPATIENT
Start: 2023-07-23 | End: 2023-07-26 | Stop reason: HOSPADM

## 2023-07-23 RX ORDER — PANTOPRAZOLE SODIUM 20 MG/1
20 TABLET, DELAYED RELEASE ORAL 2 TIMES DAILY
Status: DISCONTINUED | OUTPATIENT
Start: 2023-07-23 | End: 2023-07-26 | Stop reason: HOSPADM

## 2023-07-23 RX ORDER — LANOLIN ALCOHOL/MO/W.PET/CERES
1000 CREAM (GRAM) TOPICAL DAILY
COMMUNITY

## 2023-07-23 RX ORDER — SODIUM CHLORIDE 9 MG/ML
INJECTION, SOLUTION INTRAVENOUS PRN
Status: DISCONTINUED | OUTPATIENT
Start: 2023-07-23 | End: 2023-07-26 | Stop reason: HOSPADM

## 2023-07-23 RX ORDER — AMIODARONE HYDROCHLORIDE 200 MG/1
200 TABLET ORAL DAILY
Status: DISCONTINUED | OUTPATIENT
Start: 2023-07-23 | End: 2023-07-26 | Stop reason: HOSPADM

## 2023-07-23 RX ORDER — METFORMIN HYDROCHLORIDE 500 MG/1
500 TABLET, EXTENDED RELEASE ORAL 2 TIMES DAILY WITH MEALS
Status: ON HOLD | COMMUNITY
End: 2023-07-25 | Stop reason: HOSPADM

## 2023-07-23 RX ORDER — METOPROLOL SUCCINATE 25 MG/1
25 TABLET, EXTENDED RELEASE ORAL NIGHTLY
Status: DISCONTINUED | OUTPATIENT
Start: 2023-07-23 | End: 2023-07-26 | Stop reason: HOSPADM

## 2023-07-23 RX ORDER — DEXTROSE MONOHYDRATE 100 MG/ML
INJECTION, SOLUTION INTRAVENOUS CONTINUOUS PRN
Status: DISCONTINUED | OUTPATIENT
Start: 2023-07-23 | End: 2023-07-26 | Stop reason: HOSPADM

## 2023-07-23 RX ORDER — POLYETHYLENE GLYCOL 3350 17 G/17G
17 POWDER, FOR SOLUTION ORAL DAILY
Status: DISCONTINUED | OUTPATIENT
Start: 2023-07-23 | End: 2023-07-26 | Stop reason: HOSPADM

## 2023-07-23 RX ORDER — NITROGLYCERIN 0.4 MG/1
0.4 TABLET SUBLINGUAL ONCE
Status: DISCONTINUED | OUTPATIENT
Start: 2023-07-23 | End: 2023-07-23

## 2023-07-23 RX ORDER — AMMONIUM LACTATE 12 G/100G
1 CREAM TOPICAL 2 TIMES DAILY
COMMUNITY

## 2023-07-23 RX ORDER — FENTANYL CITRATE 0.05 MG/ML
25 INJECTION, SOLUTION INTRAMUSCULAR; INTRAVENOUS ONCE
Status: COMPLETED | OUTPATIENT
Start: 2023-07-23 | End: 2023-07-23

## 2023-07-23 RX ORDER — BUDESONIDE 0.5 MG/2ML
0.5 INHALANT ORAL
Status: DISCONTINUED | OUTPATIENT
Start: 2023-07-23 | End: 2023-07-26 | Stop reason: HOSPADM

## 2023-07-23 RX ORDER — SIMETHICONE 80 MG
80 TABLET,CHEWABLE ORAL 4 TIMES DAILY PRN
COMMUNITY

## 2023-07-23 RX ORDER — POLYETHYLENE GLYCOL 3350 17 G/17G
17 POWDER, FOR SOLUTION ORAL DAILY PRN
Status: DISCONTINUED | OUTPATIENT
Start: 2023-07-23 | End: 2023-07-26 | Stop reason: HOSPADM

## 2023-07-23 RX ORDER — LEVOTHYROXINE SODIUM 0.05 MG/1
50 TABLET ORAL
COMMUNITY

## 2023-07-23 RX ORDER — ASPIRIN 81 MG/1
81 TABLET ORAL NIGHTLY
Status: DISCONTINUED | OUTPATIENT
Start: 2023-07-23 | End: 2023-07-26 | Stop reason: HOSPADM

## 2023-07-23 RX ORDER — ACETAMINOPHEN 650 MG/1
650 SUPPOSITORY RECTAL EVERY 6 HOURS PRN
Status: DISCONTINUED | OUTPATIENT
Start: 2023-07-23 | End: 2023-07-26 | Stop reason: HOSPADM

## 2023-07-23 RX ORDER — NITROGLYCERIN 0.4 MG/1
0.4 TABLET SUBLINGUAL EVERY 5 MIN PRN
COMMUNITY

## 2023-07-23 RX ORDER — NITROGLYCERIN 0.4 MG/1
0.4 TABLET SUBLINGUAL EVERY 5 MIN PRN
Status: DISCONTINUED | OUTPATIENT
Start: 2023-07-23 | End: 2023-07-26 | Stop reason: HOSPADM

## 2023-07-23 RX ORDER — INSULIN LISPRO 100 [IU]/ML
6 INJECTION, SOLUTION INTRAVENOUS; SUBCUTANEOUS
Status: DISCONTINUED | OUTPATIENT
Start: 2023-07-23 | End: 2023-07-24

## 2023-07-23 RX ORDER — ARFORMOTEROL TARTRATE 15 UG/2ML
15 SOLUTION RESPIRATORY (INHALATION)
Status: DISCONTINUED | OUTPATIENT
Start: 2023-07-23 | End: 2023-07-26 | Stop reason: HOSPADM

## 2023-07-23 RX ORDER — 0.9 % SODIUM CHLORIDE 0.9 %
1000 INTRAVENOUS SOLUTION INTRAVENOUS ONCE
Status: DISCONTINUED | OUTPATIENT
Start: 2023-07-23 | End: 2023-07-26 | Stop reason: HOSPADM

## 2023-07-23 RX ORDER — CETIRIZINE HYDROCHLORIDE 10 MG/1
10 TABLET ORAL DAILY
Status: DISCONTINUED | OUTPATIENT
Start: 2023-07-24 | End: 2023-07-26 | Stop reason: HOSPADM

## 2023-07-23 RX ORDER — PRAVASTATIN SODIUM 20 MG
20 TABLET ORAL NIGHTLY
Status: DISCONTINUED | OUTPATIENT
Start: 2023-07-23 | End: 2023-07-26 | Stop reason: HOSPADM

## 2023-07-23 RX ORDER — SODIUM CHLORIDE 0.9 % (FLUSH) 0.9 %
5-40 SYRINGE (ML) INJECTION PRN
Status: DISCONTINUED | OUTPATIENT
Start: 2023-07-23 | End: 2023-07-26 | Stop reason: HOSPADM

## 2023-07-23 RX ORDER — ASPIRIN 81 MG
1 TABLET,CHEWABLE ORAL 3 TIMES DAILY
COMMUNITY

## 2023-07-23 RX ORDER — TAMSULOSIN HYDROCHLORIDE 0.4 MG/1
0.4 CAPSULE ORAL NIGHTLY
Status: DISCONTINUED | OUTPATIENT
Start: 2023-07-23 | End: 2023-07-26 | Stop reason: HOSPADM

## 2023-07-23 RX ORDER — DULOXETIN HYDROCHLORIDE 20 MG/1
20 CAPSULE, DELAYED RELEASE ORAL NIGHTLY
COMMUNITY

## 2023-07-23 RX ORDER — FLUTICASONE PROPIONATE 50 MCG
1 SPRAY, SUSPENSION (ML) NASAL 2 TIMES DAILY
Status: DISCONTINUED | OUTPATIENT
Start: 2023-07-23 | End: 2023-07-26 | Stop reason: HOSPADM

## 2023-07-23 RX ORDER — INSULIN ASPART 100 [IU]/ML
INJECTION, SOLUTION INTRAVENOUS; SUBCUTANEOUS
COMMUNITY

## 2023-07-23 RX ORDER — ACETAMINOPHEN 325 MG/1
650 TABLET ORAL EVERY 6 HOURS PRN
Status: DISCONTINUED | OUTPATIENT
Start: 2023-07-23 | End: 2023-07-26 | Stop reason: HOSPADM

## 2023-07-23 RX ORDER — FENTANYL CITRATE 0.05 MG/ML
50 INJECTION, SOLUTION INTRAMUSCULAR; INTRAVENOUS ONCE
Status: COMPLETED | OUTPATIENT
Start: 2023-07-23 | End: 2023-07-23

## 2023-07-23 RX ORDER — LANOLIN ALCOHOL/MO/W.PET/CERES
3 CREAM (GRAM) TOPICAL NIGHTLY
COMMUNITY

## 2023-07-23 RX ORDER — NITROGLYCERIN 0.4 MG/1
0.4 TABLET SUBLINGUAL EVERY 5 MIN PRN
Status: COMPLETED | OUTPATIENT
Start: 2023-07-23 | End: 2023-07-23

## 2023-07-23 RX ORDER — LOSARTAN POTASSIUM 100 MG/1
100 TABLET ORAL DAILY
Status: ON HOLD | COMMUNITY
End: 2023-07-25 | Stop reason: HOSPADM

## 2023-07-23 RX ORDER — ASPIRIN 81 MG/1
324 TABLET, CHEWABLE ORAL ONCE
Status: COMPLETED | OUTPATIENT
Start: 2023-07-23 | End: 2023-07-23

## 2023-07-23 RX ORDER — INSULIN GLARGINE 100 [IU]/ML
36 INJECTION, SOLUTION SUBCUTANEOUS NIGHTLY
Status: DISCONTINUED | OUTPATIENT
Start: 2023-07-23 | End: 2023-07-26 | Stop reason: HOSPADM

## 2023-07-23 RX ORDER — CARBOXYMETHYLCELLULOSE SODIUM 5 MG/ML
1 SOLUTION/ DROPS OPHTHALMIC 3 TIMES DAILY
COMMUNITY

## 2023-07-23 RX ORDER — PANTOPRAZOLE SODIUM 40 MG/1
40 TABLET, DELAYED RELEASE ORAL DAILY
COMMUNITY

## 2023-07-23 RX ORDER — FERROUS SULFATE 325(65) MG
325 TABLET ORAL
COMMUNITY

## 2023-07-23 RX ORDER — TORSEMIDE 20 MG/1
20 TABLET ORAL DAILY
COMMUNITY

## 2023-07-23 RX ADMIN — NITROGLYCERIN 0.4 MG: 0.4 TABLET, ORALLY DISINTEGRATING SUBLINGUAL at 11:39

## 2023-07-23 RX ADMIN — PRAVASTATIN SODIUM 20 MG: 20 TABLET ORAL at 21:15

## 2023-07-23 RX ADMIN — NITROGLYCERIN 0.4 MG: 0.4 TABLET, ORALLY DISINTEGRATING SUBLINGUAL at 11:27

## 2023-07-23 RX ADMIN — FENTANYL CITRATE 25 MCG: 50 INJECTION INTRAMUSCULAR; INTRAVENOUS at 13:21

## 2023-07-23 RX ADMIN — METOPROLOL SUCCINATE 25 MG: 25 TABLET, EXTENDED RELEASE ORAL at 21:15

## 2023-07-23 RX ADMIN — INSULIN GLARGINE 36 UNITS: 100 INJECTION, SOLUTION SUBCUTANEOUS at 21:13

## 2023-07-23 RX ADMIN — ASPIRIN 81 MG 324 MG: 81 TABLET ORAL at 11:27

## 2023-07-23 RX ADMIN — SODIUM CHLORIDE, PRESERVATIVE FREE 10 ML: 5 INJECTION INTRAVENOUS at 21:15

## 2023-07-23 RX ADMIN — NITROGLYCERIN 0.4 MG: 0.4 TABLET, ORALLY DISINTEGRATING SUBLINGUAL at 18:46

## 2023-07-23 RX ADMIN — TAMSULOSIN HYDROCHLORIDE 0.4 MG: 0.4 CAPSULE ORAL at 21:15

## 2023-07-23 RX ADMIN — PANTOPRAZOLE SODIUM 20 MG: 20 TABLET, DELAYED RELEASE ORAL at 21:15

## 2023-07-23 RX ADMIN — AMIODARONE HYDROCHLORIDE 200 MG: 200 TABLET ORAL at 18:46

## 2023-07-23 RX ADMIN — BUDESONIDE INHALATION 500 MCG: 0.5 SUSPENSION RESPIRATORY (INHALATION) at 18:11

## 2023-07-23 RX ADMIN — FENTANYL CITRATE 50 MCG: 50 INJECTION INTRAMUSCULAR; INTRAVENOUS at 14:12

## 2023-07-23 RX ADMIN — NITROGLYCERIN 0.4 MG: 0.4 TABLET, ORALLY DISINTEGRATING SUBLINGUAL at 12:24

## 2023-07-23 RX ADMIN — ASPIRIN 81 MG: 81 TABLET, COATED ORAL at 21:15

## 2023-07-23 RX ADMIN — ACETAMINOPHEN 650 MG: 325 TABLET ORAL at 21:14

## 2023-07-23 RX ADMIN — RIVAROXABAN 20 MG: 20 TABLET, FILM COATED ORAL at 18:46

## 2023-07-23 RX ADMIN — ARFORMOTEROL TARTRATE 15 MCG: 15 SOLUTION RESPIRATORY (INHALATION) at 18:12

## 2023-07-23 ASSESSMENT — PAIN DESCRIPTION - DESCRIPTORS
DESCRIPTORS: SHARP
DESCRIPTORS: SHARP
DESCRIPTORS: ACHING
DESCRIPTORS: SHARP;POUNDING
DESCRIPTORS: SHARP
DESCRIPTORS: SHARP;TIGHTNESS
DESCRIPTORS: SHARP
DESCRIPTORS: SHARP
DESCRIPTORS: ACHING
DESCRIPTORS: DULL

## 2023-07-23 ASSESSMENT — PAIN DESCRIPTION - ONSET
ONSET: ON-GOING
ONSET: SUDDEN
ONSET: ON-GOING

## 2023-07-23 ASSESSMENT — PAIN DESCRIPTION - FREQUENCY
FREQUENCY: CONTINUOUS
FREQUENCY: INTERMITTENT
FREQUENCY: INTERMITTENT
FREQUENCY: CONTINUOUS
FREQUENCY: INTERMITTENT
FREQUENCY: INTERMITTENT
FREQUENCY: CONTINUOUS
FREQUENCY: INTERMITTENT
FREQUENCY: INTERMITTENT
FREQUENCY: CONTINUOUS
FREQUENCY: INTERMITTENT

## 2023-07-23 ASSESSMENT — PAIN DESCRIPTION - LOCATION
LOCATION: CHEST
LOCATION: ARM
LOCATION: CHEST

## 2023-07-23 ASSESSMENT — PAIN SCALES - WONG BAKER: WONGBAKER_NUMERICALRESPONSE: 0

## 2023-07-23 ASSESSMENT — PAIN DESCRIPTION - PAIN TYPE
TYPE: ACUTE PAIN

## 2023-07-23 ASSESSMENT — PAIN SCALES - GENERAL
PAINLEVEL_OUTOF10: 7
PAINLEVEL_OUTOF10: 5
PAINLEVEL_OUTOF10: 7
PAINLEVEL_OUTOF10: 5
PAINLEVEL_OUTOF10: 3
PAINLEVEL_OUTOF10: 9
PAINLEVEL_OUTOF10: 0
PAINLEVEL_OUTOF10: 4
PAINLEVEL_OUTOF10: 7
PAINLEVEL_OUTOF10: 4
PAINLEVEL_OUTOF10: 7
PAINLEVEL_OUTOF10: 7
PAINLEVEL_OUTOF10: 1
PAINLEVEL_OUTOF10: 8
PAINLEVEL_OUTOF10: 5
PAINLEVEL_OUTOF10: 7
PAINLEVEL_OUTOF10: 8

## 2023-07-23 ASSESSMENT — PAIN DESCRIPTION - ORIENTATION
ORIENTATION: LEFT
ORIENTATION: LEFT;UPPER
ORIENTATION: LEFT

## 2023-07-23 ASSESSMENT — LIFESTYLE VARIABLES: HOW OFTEN DO YOU HAVE A DRINK CONTAINING ALCOHOL: NEVER

## 2023-07-23 NOTE — ED PROVIDER NOTES
Katelyn Jessica [address] ENCOUNTER        Pt Name: Vira Benitez  MRN: [id number]  [address] [facility] 1945  Date of evaluation: 7/23/2023  Provider: Shanel Henderson MD  PCP: Deanna Burgos  Note Started: 2:30 PM EDT 7/23/23    CHIEF COMPLAINT       Chief Complaint   Patient presents with    Chest Pain     Left arm pain started at 0730 today and radiating to left chest, denies shortness of breath, no n/v, c/o headache       HISTORY OF PRESENT ILLNESS: 1 or more Elements   History From: Patient    Limitations to history : None    Vira Benitez is a 78 y.o. male with a history of type II DM, hyperglycemia, congestive heart failure, acute coronary syndrome, hypertension, hyperlipidemia and atrial fibrillation who presents to the ED with complaints of chest pain which is sharp in nature and radiating to left upper extremity, started this morning at 730. He denies any shortness of breath, nausea, vomiting, fever, headache. He also denies any abdominal pain, constipation, diarrhea, urinary symptoms. Nursing Notes were all reviewed and agreed with or any disagreements were addressed in the HPI.    ROS:   Pertinent positives and negatives are stated within HPI, all other systems reviewed and are negative.    --------------------------------------------- PAST HISTORY ---------------------------------------------  Past Medical History:  has a past medical history of Arthritis, Atrial fibrillation ([address]), Diabetes mellitus type 2, noninsulin dependent ([address]), Dyspnea, History of cardiovascular stress test, History of echocardiogram, Hyperlipidemia, Hypertension, and Preoperative clearance. Past Surgical History:  has a past surgical history that includes Cholecystectomy; cardiovascular stress test (08/11); Diagnostic Cardiac Cath Lab Procedure (11/8/2007); Diagnostic Cardiac Cath Lab Procedure (4/26/2010);  Cardiac catheterization (04/26/2012); and Cardiac

## 2023-07-23 NOTE — H&P
Department of Internal Medicine  History and Physical    PCP: Tony Alvarado  Admitting Physician: Dr. Makenna Lau  Consultants:  Date of Service:    CHIEF COMPLAINT:  7/23/2023    HISTORY OF PRESENT ILLNESS:    Patient is 78-year-old male who presented to the ED with chest pain. States that it started earlier today. Describes it as sharp in nature and radiates to left upper extremity. States they last a few minutes at a time. Nothing makes it worse or better. He denies any associated shortness of breath or lightheadedness or dizziness. Patient does admit to associated headache. Patient was at home with daughter. He uses walker outside of home. PAST MEDICAL Hx:  Past Medical History:   Diagnosis Date    Arthritis     Atrial fibrillation ([address])     Diabetes mellitus type 2, noninsulin dependent ([address])     Dyspnea     History of cardiovascular stress test 10/05/2018    Lexiscan stress test    History of echocardiogram 12/28/2017    EF  55%    Hyperlipidemia     Hypertension     Preoperative clearance     on chart for surgery with KRISHAN Han 12/2015       PAST SURGICAL Hx:   Past Surgical History:   Procedure Laterality Date    CARDIAC CATHETERIZATION  04/26/2012    CARDIAC CATHETERIZATION  10/12/2018    Dr. Danii Madison TEST  08/11    [address] CATH LAB PROCEDURE  11/8/2007    Dr. Anila Jalloh No CAD EF 59%    DIAGNOSTIC CARDIAC CATH LAB PROCEDURE  4/26/2010    Dr. Chelsea Bass: EF 66% Moderate ectasia involving RCA and cx       FAMILY Hx:  Family History   Problem Relation Age of Onset    Cancer Mother     Mental Illness Sister     Heart Disease Brother        HOME MEDICATIONS:  Prior to Admission medications    Medication Sig Start Date End Date Taking?  Authorizing Provider   albuterol sulfate  (90 Base) MCG/ACT inhaler Inhale 2 puffs into the lungs every 6 hours as needed for Wheezing    Historical Provider, MD   albuterol (PROVENTIL) (2.5 MG/3ML) 0.083% nebulizer

## 2023-07-24 ENCOUNTER — APPOINTMENT (OUTPATIENT)
Dept: NUCLEAR MEDICINE | Age: 78
End: 2023-07-24
Payer: OTHER GOVERNMENT

## 2023-07-24 ENCOUNTER — APPOINTMENT (OUTPATIENT)
Dept: NON INVASIVE DIAGNOSTICS | Age: 78
End: 2023-07-24
Payer: OTHER GOVERNMENT

## 2023-07-24 LAB
ALBUMIN SERPL-MCNC: 3.9 G/DL (ref 3.5–5.2)
ALP SERPL-CCNC: 64 U/L (ref 40–129)
ALT SERPL-CCNC: 15 U/L (ref 0–40)
ANION GAP SERPL CALCULATED.3IONS-SCNC: 10 MMOL/L (ref 7–16)
AST SERPL-CCNC: 19 U/L (ref 0–39)
BASOPHILS # BLD: 0.02 K/UL (ref 0–0.2)
BASOPHILS NFR BLD: 0 % (ref 0–2)
BILIRUB SERPL-MCNC: 0.7 MG/DL (ref 0–1.2)
BUN SERPL-MCNC: 24 MG/DL (ref 6–23)
CALCIUM SERPL-MCNC: 9.3 MG/DL (ref 8.6–10.2)
CHLORIDE SERPL-SCNC: 99 MMOL/L (ref 98–107)
CHOLEST SERPL-MCNC: 99 MG/DL
CO2 SERPL-SCNC: 24 MMOL/L (ref 22–29)
CREAT SERPL-MCNC: 1.9 MG/DL (ref 0.7–1.2)
EOSINOPHIL # BLD: 0.07 K/UL (ref 0.05–0.5)
EOSINOPHILS RELATIVE PERCENT: 1 % (ref 0–6)
ERYTHROCYTE [DISTWIDTH] IN BLOOD BY AUTOMATED COUNT: 14.7 % (ref 11.5–15)
GFR SERPL CREATININE-BSD FRML MDRD: 35 ML/MIN/1.73M2
GLUCOSE SERPL-MCNC: 287 MG/DL (ref 74–99)
HCT VFR BLD AUTO: 35.1 % (ref 37–54)
HDLC SERPL-MCNC: 25 MG/DL
HGB BLD-MCNC: 11 G/DL (ref 12.5–16.5)
IMM GRANULOCYTES # BLD AUTO: 0.03 K/UL (ref 0–0.58)
IMM GRANULOCYTES NFR BLD: 1 % (ref 0–5)
LDLC SERPL CALC-MCNC: 29 MG/DL
LV EF: 75 %
LVEF MODALITY: NORMAL
LYMPHOCYTES NFR BLD: 2.23 K/UL (ref 1.5–4)
LYMPHOCYTES RELATIVE PERCENT: 38 % (ref 20–42)
MAGNESIUM SERPL-MCNC: 2.1 MG/DL (ref 1.6–2.6)
MCH RBC QN AUTO: 25.6 PG (ref 26–35)
MCHC RBC AUTO-ENTMCNC: 31.3 G/DL (ref 32–34.5)
MCV RBC AUTO: 81.8 FL (ref 80–99.9)
METER GLUCOSE: 197 MG/DL (ref 74–99)
METER GLUCOSE: 255 MG/DL (ref 74–99)
METER GLUCOSE: 256 MG/DL (ref 74–99)
METER GLUCOSE: 266 MG/DL (ref 74–99)
MONOCYTES NFR BLD: 0.42 K/UL (ref 0.1–0.95)
MONOCYTES NFR BLD: 7 % (ref 2–12)
NEUTROPHILS NFR BLD: 52 % (ref 43–80)
NEUTS SEG NFR BLD: 3.04 K/UL (ref 1.8–7.3)
PHOSPHATE SERPL-MCNC: 3 MG/DL (ref 2.5–4.5)
PLATELET # BLD AUTO: 83 K/UL (ref 130–450)
PLATELET CONFIRMATION: NORMAL
PMV BLD AUTO: 9.8 FL (ref 7–12)
POTASSIUM SERPL-SCNC: 4.3 MMOL/L (ref 3.5–5)
PROCALCITONIN SERPL-MCNC: 0.08 NG/ML (ref 0–0.08)
PROT SERPL-MCNC: 6.7 G/DL (ref 6.4–8.3)
RBC # BLD AUTO: 4.29 M/UL (ref 3.8–5.8)
SODIUM SERPL-SCNC: 133 MMOL/L (ref 132–146)
T4 FREE SERPL-MCNC: 1.6 NG/DL (ref 0.9–1.7)
TRIGL SERPL-MCNC: 224 MG/DL
TSH SERPL DL<=0.05 MIU/L-ACNC: 2.95 UIU/ML (ref 0.27–4.2)
VLDLC SERPL CALC-MCNC: 45 MG/DL
WBC OTHER # BLD: 5.8 K/UL (ref 4.5–11.5)

## 2023-07-24 PROCEDURE — 6370000000 HC RX 637 (ALT 250 FOR IP): Performed by: INTERNAL MEDICINE

## 2023-07-24 PROCEDURE — 6360000002 HC RX W HCPCS: Performed by: INTERNAL MEDICINE

## 2023-07-24 PROCEDURE — 84443 ASSAY THYROID STIM HORMONE: CPT

## 2023-07-24 PROCEDURE — 82947 ASSAY GLUCOSE BLOOD QUANT: CPT

## 2023-07-24 PROCEDURE — G0378 HOSPITAL OBSERVATION PER HR: HCPCS

## 2023-07-24 PROCEDURE — 84145 PROCALCITONIN (PCT): CPT

## 2023-07-24 PROCEDURE — 84439 ASSAY OF FREE THYROXINE: CPT

## 2023-07-24 PROCEDURE — 2580000003 HC RX 258: Performed by: INTERNAL MEDICINE

## 2023-07-24 PROCEDURE — 80053 COMPREHEN METABOLIC PANEL: CPT

## 2023-07-24 PROCEDURE — 78452 HT MUSCLE IMAGE SPECT MULT: CPT

## 2023-07-24 PROCEDURE — 94640 AIRWAY INHALATION TREATMENT: CPT

## 2023-07-24 PROCEDURE — 78452 HT MUSCLE IMAGE SPECT MULT: CPT | Performed by: INTERNAL MEDICINE

## 2023-07-24 PROCEDURE — 85027 COMPLETE CBC AUTOMATED: CPT

## 2023-07-24 PROCEDURE — 99244 OFF/OP CNSLTJ NEW/EST MOD 40: CPT | Performed by: INTERNAL MEDICINE

## 2023-07-24 PROCEDURE — 93017 CV STRESS TEST TRACING ONLY: CPT

## 2023-07-24 PROCEDURE — 94660 CPAP INITIATION&MGMT: CPT

## 2023-07-24 PROCEDURE — 83036 HEMOGLOBIN GLYCOSYLATED A1C: CPT

## 2023-07-24 PROCEDURE — 83735 ASSAY OF MAGNESIUM: CPT

## 2023-07-24 PROCEDURE — 84100 ASSAY OF PHOSPHORUS: CPT

## 2023-07-24 PROCEDURE — 80061 LIPID PANEL: CPT

## 2023-07-24 PROCEDURE — 3430000000 HC RX DIAGNOSTIC RADIOPHARMACEUTICAL: Performed by: RADIOLOGY

## 2023-07-24 PROCEDURE — A9500 TC99M SESTAMIBI: HCPCS | Performed by: RADIOLOGY

## 2023-07-24 PROCEDURE — 93016 CV STRESS TEST SUPVJ ONLY: CPT | Performed by: INTERNAL MEDICINE

## 2023-07-24 PROCEDURE — 93018 CV STRESS TEST I&R ONLY: CPT | Performed by: INTERNAL MEDICINE

## 2023-07-24 RX ORDER — INSULIN LISPRO 100 [IU]/ML
0-4 INJECTION, SOLUTION INTRAVENOUS; SUBCUTANEOUS NIGHTLY
Status: DISCONTINUED | OUTPATIENT
Start: 2023-07-24 | End: 2023-07-26 | Stop reason: HOSPADM

## 2023-07-24 RX ORDER — DULOXETIN HYDROCHLORIDE 20 MG/1
20 CAPSULE, DELAYED RELEASE ORAL NIGHTLY
Status: DISCONTINUED | OUTPATIENT
Start: 2023-07-24 | End: 2023-07-26 | Stop reason: HOSPADM

## 2023-07-24 RX ORDER — TETRAKIS(2-METHOXYISOBUTYLISOCYANIDE)COPPER(I) TETRAFLUOROBORATE 1 MG/ML
10 INJECTION, POWDER, LYOPHILIZED, FOR SOLUTION INTRAVENOUS
Status: COMPLETED | OUTPATIENT
Start: 2023-07-24 | End: 2023-07-24

## 2023-07-24 RX ORDER — TETRAKIS(2-METHOXYISOBUTYLISOCYANIDE)COPPER(I) TETRAFLUOROBORATE 1 MG/ML
30 INJECTION, POWDER, LYOPHILIZED, FOR SOLUTION INTRAVENOUS
Status: COMPLETED | OUTPATIENT
Start: 2023-07-24 | End: 2023-07-24

## 2023-07-24 RX ORDER — INSULIN LISPRO 100 [IU]/ML
0-8 INJECTION, SOLUTION INTRAVENOUS; SUBCUTANEOUS
Status: DISCONTINUED | OUTPATIENT
Start: 2023-07-24 | End: 2023-07-26 | Stop reason: HOSPADM

## 2023-07-24 RX ORDER — LEVOTHYROXINE SODIUM 0.05 MG/1
50 TABLET ORAL
Status: DISCONTINUED | OUTPATIENT
Start: 2023-07-24 | End: 2023-07-26 | Stop reason: HOSPADM

## 2023-07-24 RX ORDER — REGADENOSON 0.08 MG/ML
0.4 INJECTION, SOLUTION INTRAVENOUS
Status: COMPLETED | OUTPATIENT
Start: 2023-07-24 | End: 2023-07-24

## 2023-07-24 RX ORDER — TORSEMIDE 20 MG/1
20 TABLET ORAL DAILY
Status: DISCONTINUED | OUTPATIENT
Start: 2023-07-24 | End: 2023-07-26 | Stop reason: HOSPADM

## 2023-07-24 RX ADMIN — BUDESONIDE INHALATION 500 MCG: 0.5 SUSPENSION RESPIRATORY (INHALATION) at 18:41

## 2023-07-24 RX ADMIN — ARFORMOTEROL TARTRATE 15 MCG: 15 SOLUTION RESPIRATORY (INHALATION) at 04:55

## 2023-07-24 RX ADMIN — INSULIN GLARGINE 36 UNITS: 100 INJECTION, SOLUTION SUBCUTANEOUS at 21:50

## 2023-07-24 RX ADMIN — SODIUM CHLORIDE, PRESERVATIVE FREE 10 ML: 5 INJECTION INTRAVENOUS at 21:49

## 2023-07-24 RX ADMIN — LEVOTHYROXINE SODIUM 50 MCG: 0.05 TABLET ORAL at 06:46

## 2023-07-24 RX ADMIN — BUDESONIDE INHALATION 500 MCG: 0.5 SUSPENSION RESPIRATORY (INHALATION) at 04:55

## 2023-07-24 RX ADMIN — REGADENOSON 0.4 MG: 0.08 INJECTION, SOLUTION INTRAVENOUS at 10:13

## 2023-07-24 RX ADMIN — RIVAROXABAN 15 MG: 15 TABLET, FILM COATED ORAL at 17:06

## 2023-07-24 RX ADMIN — DULOXETINE HYDROCHLORIDE 20 MG: 20 CAPSULE, DELAYED RELEASE ORAL at 21:49

## 2023-07-24 RX ADMIN — PANTOPRAZOLE SODIUM 20 MG: 20 TABLET, DELAYED RELEASE ORAL at 11:51

## 2023-07-24 RX ADMIN — SODIUM CHLORIDE, PRESERVATIVE FREE 10 ML: 5 INJECTION INTRAVENOUS at 11:52

## 2023-07-24 RX ADMIN — TORSEMIDE 20 MG: 20 TABLET ORAL at 11:51

## 2023-07-24 RX ADMIN — AMIODARONE HYDROCHLORIDE 200 MG: 200 TABLET ORAL at 11:51

## 2023-07-24 RX ADMIN — PANTOPRAZOLE SODIUM 20 MG: 20 TABLET, DELAYED RELEASE ORAL at 21:49

## 2023-07-24 RX ADMIN — TAMSULOSIN HYDROCHLORIDE 0.4 MG: 0.4 CAPSULE ORAL at 21:49

## 2023-07-24 RX ADMIN — Medication 30 MILLICURIE: at 11:24

## 2023-07-24 RX ADMIN — INSULIN LISPRO 6 UNITS: 100 INJECTION, SOLUTION INTRAVENOUS; SUBCUTANEOUS at 11:51

## 2023-07-24 RX ADMIN — ACETAMINOPHEN 650 MG: 325 TABLET ORAL at 21:49

## 2023-07-24 RX ADMIN — ASPIRIN 81 MG: 81 TABLET, COATED ORAL at 21:49

## 2023-07-24 RX ADMIN — Medication 10 MILLICURIE: at 08:39

## 2023-07-24 RX ADMIN — INSULIN LISPRO 4 UNITS: 100 INJECTION, SOLUTION INTRAVENOUS; SUBCUTANEOUS at 17:06

## 2023-07-24 RX ADMIN — FLUTICASONE PROPIONATE 1 SPRAY: 50 SPRAY, METERED NASAL at 11:55

## 2023-07-24 RX ADMIN — ARFORMOTEROL TARTRATE 15 MCG: 15 SOLUTION RESPIRATORY (INHALATION) at 18:41

## 2023-07-24 RX ADMIN — PRAVASTATIN SODIUM 20 MG: 20 TABLET ORAL at 21:49

## 2023-07-24 RX ADMIN — FLUTICASONE PROPIONATE 1 SPRAY: 50 SPRAY, METERED NASAL at 21:48

## 2023-07-24 RX ADMIN — CETIRIZINE HYDROCHLORIDE 10 MG: 10 TABLET, FILM COATED ORAL at 11:52

## 2023-07-24 RX ADMIN — METOPROLOL SUCCINATE 25 MG: 25 TABLET, EXTENDED RELEASE ORAL at 21:49

## 2023-07-24 ASSESSMENT — PAIN SCALES - GENERAL
PAINLEVEL_OUTOF10: 3
PAINLEVEL_OUTOF10: 0

## 2023-07-24 ASSESSMENT — PAIN DESCRIPTION - DESCRIPTORS: DESCRIPTORS: ACHING;DISCOMFORT

## 2023-07-24 ASSESSMENT — PAIN DESCRIPTION - LOCATION: LOCATION: CHEST

## 2023-07-24 NOTE — PROCEDURES
[address] Nuclear Stress Test:    Cardiologist: Dr. Mary Canchola MD    Date: 7/24/2023    Indications for study: Chest pain    No chest pain  No new arrhythmias  No EKG changes suggestive of stress induced ischemia  Nuclear images pending    Mary Canchola MD  [facility]) Cardiology

## 2023-07-24 NOTE — PLAN OF CARE
Problem: Discharge Planning  Goal: Discharge to home or other facility with appropriate resources  Outcome: Progressing  Flowsheets (Taken 7/24/2023 1156)  Discharge to home or other facility with appropriate resources:   Identify barriers to discharge with patient and caregiver   Arrange for needed discharge resources and transportation as appropriate     Problem: Pain  Goal: Verbalizes/displays adequate comfort level or baseline comfort level  Outcome: Progressing  Flowsheets (Taken 7/24/2023 1156)  Verbalizes/displays adequate comfort level or baseline comfort level: Encourage patient to monitor pain and request assistance     Problem: ABCDS Injury Assessment  Goal: Absence of physical injury  Outcome: Progressing  Flowsheets (Taken 7/24/2023 1156)  Absence of Physical Injury: Implement safety measures based on patient assessment     Problem: Safety - Adult  Goal: Free from fall injury  Outcome: Progressing  Flowsheets (Taken 7/24/2023 1156)  Free From Fall Injury: Instruct family/caregiver on patient safety

## 2023-07-24 NOTE — PROCEDURES
Department of Internal Medicine  PN    PCP: Arpit Morales  Admitting Physician: Dr. Dariela Jain  Consultants:  Date of Service:    CHIEF COMPLAINT:  7/23/2023    HISTORY OF PRESENT ILLNESS:    Patient is 57-year-old male who presented to the ED with chest pain. States that it started earlier today. Describes it as sharp in nature and radiates to left upper extremity. States they last a few minutes at a time. Nothing makes it worse or better. He denies any associated shortness of breath or lightheadedness or dizziness. Patient does admit to associated headache. Patient was at home with daughter. He uses walker outside of home. 7/24/2023  Patient seen and examined on PCU. Patient had IV Lexiscan stress test this morning. The initial Lexiscan portion was negative. Pending lab work this morning. Patient's family is at bedside and case discussed. Patient denies any current chest pain. Patient is hard of hearing. Blood sugars range 1 . Temperature 98.5 heart rate 67 blood pressure 1 [birth year]/1973. O2 sat 99% room air at rest.  Patient daughter is at bedside and states the patient does follow-up with [place] [facility]. She denies knowing that he has a nephrologist with the [place]. She denies any history of known chronic kidney disease.   Patient is to have his lab work drawn this morning    PAST MEDICAL Hx:  Past Medical History:   Diagnosis Date    Arthritis     Atrial fibrillation ([address])     Diabetes mellitus type 2, noninsulin dependent ([address])     Dyspnea     H/O cardiovascular stress test 07/24/2023    Lexiscan    History of cardiovascular stress test 10/05/2018    Lexiscan stress test    History of echocardiogram 12/28/2017    EF  55%    Hyperlipidemia     Hypertension     Preoperative clearance     on chart for surgery with KRISHAN Han 12/2015       PAST SURGICAL Hx:   Past Surgical History:   Procedure Laterality Date    CARDIAC CATHETERIZATION  04/26/2012    CARDIAC CATHETERIZATION  10/12/2018    Dr. Jeronimo Lesch

## 2023-07-24 NOTE — CONSULTS
Inpatient Cardiology Consultation      Reason for Consult: Persistent chest pain    Consulting Physician: Dr. Ning Breaux    Requesting provider-Dr. Evaristo Juarez    Date of Consultation: 7/24/2023    HISTORY OF PRESENT ILLNESS:     The 78-year-old male is known to [facility] cardiology and is followed by Dr. Fernando Estrada. He does not follow at our office. He was last evaluated during a hospital consult on May 18, 2019. He has a history of atrial fibrillation and is anticoagulated with Xarelto. He was maintaining sinus rhythm. He was short of breath and a 2D echo was done. The echocardiogram was unremarkable, see report below    He follows at the [facility] cardiology. He was seen there a few weeks ago for routine visit and everything was stable. He presented to the emergency room yesterday with sharp pain in his left shoulder. It was sharp and started while he was at rest.  He had just finished eating and he has a history of GERD but it did not feel like his usual GERD pain. He denies any shoulder problems. He had no abdominal pain or vomiting. The discomfort lasted till he got to the emergency room. It was then resolved but he came back last night. Nothing makes it bettere or worse except pain meds given in ED. Blood pressure on admission 216/117 and heart rate 72 bpm and he was afebrile with no hypoxia on room air. EKG was sinus rhythm    Chest x-ray showed no acute process    Potassium was 5.4 with a BUN and creatinine of 29 and 1.9, serum glucose 280 and high-sensitivity troponin 19-19, WBC 6.8 with an H&H of 11.7 and 39.2 and platelets of 83, D-dimer not done and BNP was 162    He was treated with aspirin and given Sublimaze as well as a fluid bolus.   It appears his blood pressure came down spontaneously when the pain had improved    Resting stress test is pending    Past medical history  Obesity  Tobacco abuse HX  Hypertension  Hyperlipidemia  Type 2 diabetes, insulin requiring  Anemia and iron tricuspid regurgitation. RVSP is 32 mmHg. Pulmonary hypertension is mild . Assessment  Atypical chest pain  Paroxysmal atrial fibrillation  Moderate LVH  Acute on chronic kidney injury  Obesity  Tobacco abuse  Hypertension  Hyperlipidemia  Type 2 diabetes, insulin requiring  Anemia and iron deficiency  Stress test in 2015  Paroxysmal atrial fibrillation chronic anticoagulation for history of cardioversions  Obstructive sleep apnea not fully compliant  Chronic kidney disease with a baseline creatinine 1.3-1.5  Thrombocytopenia, chronic     Plan:   Chest pain atypical but patient has risk factors and deserves further evaluation with stress test and echocardiogram.  He underwent pharmacologic myocardial perfusion stress test today and awaiting results  Please arrange for echocardiogram  Obtain TSH and fasting lipid profile to guide therapy  Consider nephrology consultation given acute on chronic kidney injury  CPAP compliance is advised. Further recommendation to follow after echo and stress test are resulted  Follow low-salt diet and continue on current meds for blood pressure control but if persistent uncontrolled blood pressure we will adjust cardiac medications.   Avoid nephrotoxic agents given acute on chronic kidney injury  Continue on beta-blocker and Xarelto for A-fib        Sindhu Ge MD  [facility]

## 2023-07-24 NOTE — PROGRESS NOTES
Dr. Jimi López,    Your patient is on a medication that requires a renal dose adjustment. Renal Function Assessment:    Date Body Weight IBW  Adjusted BW SCr  CrCl Dialysis status   7/24/2023 260 lb (117.9 kg)  Ideal body weight: 77.6 kg (171 lb 1.2 oz)  Adjusted ideal body weight: 93.7 kg (206 lb 10.3 oz) Serum creatinine: 1.9 mg/dL (H) 07/23/23 1127  Estimated creatinine clearance: 41 mL/min (A) N/a       Pharmacy has renally dose-adjusted the following medication(s):    Date Original Order Renally Adjusted Order   7/24/2023 Rivaroxaban 20mg daily Rivaroxaban 15mg daily       These changes were made per protocol according to the [facility] Renal Function-Based Dose Adjustments Policy    *Please note this dose may need readjusted if your patient's renal function significantly improves. Please contact pharmacy with any questions regarding these changes.     Adam Cole, [facility]  7/24/2023  9:28 AM

## 2023-07-24 NOTE — ACP (ADVANCE CARE PLANNING)
Advance Care Planning   Healthcare Decision Maker:    Primary Decision Maker: Anselmo Para - Child - [phone]

## 2023-07-24 NOTE — CARE COORDINATION
Case Management Assessment  Initial Evaluation    Date/Time of Evaluation: 7/24/2023 5:25 PM  Assessment Completed by: Ameya Tom RN    If patient is discharged prior to next notation, then this note serves as note for discharge by case management. Patient Name: Rudy Willingham                   YOB: 1945  Diagnosis: Chest pain [R07.9]  Elevated troponin [R77.8]  Chest pain, unspecified type [R07.9]                   Date / Time: 7/23/2023 11:12 AM    Patient Admission Status: Observation   Readmission Risk (Low < 19, Mod (19-27), High > 27): No data recorded  Current PCP: Chloe Foster  PCP verified by CM? Yes Lizet Henry)    Chart Reviewed: Yes      History Provided by: Patient, Child/Family (pt and pts daughter Britney Rothman)  Patient Orientation: Alert and Oriented    Patient Cognition: Alert    Hospitalization in the last 30 days (Readmission):  No    If yes, Readmission Assessment in CM Navigator will be completed. Advance Directives:      Code Status: Full Code   Patient's Primary Decision Maker is: Legal Next of Kin    [email or web address] Seen - Child - [phone]    Discharge Planning:    Patient lives with: Children Type of Home: House  Primary Care Giver: Self  Patient Support Systems include: Children   Current Financial resources:    Current community resources:    Current services prior to admission: None            Current DME:              Type of Home Care services:  None    ADLS  Prior functional level: Independent in ADLs/IADLs  Current functional level: Independent in ADLs/IADLs        Family can provide assistance at DC: Yes  Would you like Case Management to discuss the discharge plan with any other family members/significant others, and if so, who?  Yes (pts daughter Britney Rothman)  Plans to Return to Present Housing: Yes  Other Identified Issues/Barriers to RETURNING to current housing: none  Potential Assistance needed at discharge: N/A            Potential DME: Patient expects to discharge to: House  Plan for transportation at discharge:      Financial    Payor: John Paul Aparicio / Plan: John Paul Aparicio / Product Type: *No Product type* /       Potential assistance Purchasing Medications:    Meds-to-Beds request: No      [facility], [address] [facility]  [address]  [address] [facility] [address]  Phone: [phone] Fax: [address], [id number] [place] [address] [phone] Adiel Kauffman [phone]  [address] [place] [address]  Phone: [phone] Fax: [phone]      Notes:    Factors facilitating achievement of predicted outcomes: Family support, Cooperative, and Pleasant    Barriers to discharge: none    Additional Case Management Notes: 7/24/23 1518 CM note: no covid testing this admit. OBSERVATION. Room air. Pt had stress test today- results pending. Pt has primary Akua Kirkpatrick, notified the [facility] of pts admit under observation status to our hospital. Met with patient and his daughter Agustín Goldman at the bedside to discuss transition of care at discharge. Pt resides with Agustín Goldman, her son, and son's girlfriend in a 2 floor home; however pt uses the 1st floor only where his bed and bath is located. Pt is mostly independent with ADLs, Agustín Goldman provides his transportation, and his DME includes cane, rollator, shower chair (he doesn't use), kleber 2 freestyle glucose monitoring system, and cpap and nebulizer he obtained through the [facility]. Pts PCP is Betty Rodríguez at the [facility] and he gets all his medications mailed to him through the [facility]. If he needs an immediate medication they will drive to [facility] in [place] to get it. Pt has no hx HHC or SNF. Discharge plan is home and pt declines need for HHC. Pts daughter will provide transportation home. CM will follow.  Electronically signed by Glenn Hennessy RN on 7/24/2023 at 5:31 PM         Glenn Hennessy RN  Case Management Department

## 2023-07-24 NOTE — PROGRESS NOTES
Admit orders placed prior to home medication reconciliation.  The following relevant adjustments were made to home list:    - Toprol 25 mg qHS updated to coreg 18.75 mg (1.5 tabs) BID  - Synthroid 50 mcg daily added  - Lantus 36 units qHS updated to 46 units qHS  - Novolog 6 units with meals updated to 15 units with meals      Please advise whether these adjustments should be made to inpatient orders      Quan Crawford PharmD 7/23/2023 8:47 PM   [phone]

## 2023-07-24 NOTE — PROGRESS NOTES
4 Eyes Skin Assessment     NAME:  Rfaa Tavares  YOB: 1945  MEDICAL RECORD NUMBER:  [id number]    The patient is being assessed for  Admission    I agree that at least one RN has performed a thorough Head to Toe Skin Assessment on the patient. ALL assessment sites listed below have been assessed. Areas assessed by both nurses:    Head, Face, Ears, Shoulders, Back, Chest, Arms, Elbows, Hands, Sacrum. Buttock, Coccyx, Ischium, Legs. Feet and Heels, and Under Medical Devices         Does the Patient have a Wound?  No noted wound(s)       Tim Prevention initiated by RN: Yes  Wound Care Orders initiated by RN: Yes    Pressure Injury (Stage 3,4, Unstageable, DTI, NWPT, and Complex wounds) if present, place Wound referral order by RN under : No    New Ostomies, if present place, Ostomy referral order under : No     Nurse 1 eSignature: Electronically signed by Laurena Rinne, RN on 7/23/23 at 11:24 PM EDT    **SHARE this note so that the co-signing nurse can place an eSignature**    Nurse 2 eSignature: Electronically signed by Joi Shipman RN on 7/23/23 at 11:28 PM EDT

## 2023-07-25 ENCOUNTER — APPOINTMENT (OUTPATIENT)
Dept: ULTRASOUND IMAGING | Age: 78
End: 2023-07-25
Payer: OTHER GOVERNMENT

## 2023-07-25 LAB
ALBUMIN SERPL-MCNC: 4 G/DL (ref 3.5–5.2)
ALP SERPL-CCNC: 61 U/L (ref 40–129)
ALT SERPL-CCNC: 15 U/L (ref 0–40)
ANION GAP SERPL CALCULATED.3IONS-SCNC: 10 MMOL/L (ref 7–16)
AST SERPL-CCNC: 17 U/L (ref 0–39)
BASOPHILS # BLD: 0.06 K/UL (ref 0–0.2)
BASOPHILS NFR BLD: 1 % (ref 0–2)
BILIRUB SERPL-MCNC: 0.6 MG/DL (ref 0–1.2)
BILIRUB UR QL STRIP: NEGATIVE
BUN SERPL-MCNC: 24 MG/DL (ref 6–23)
CALCIUM SERPL-MCNC: 8.9 MG/DL (ref 8.6–10.2)
CHLORIDE SERPL-SCNC: 101 MMOL/L (ref 98–107)
CLARITY UR: CLEAR
CO2 SERPL-SCNC: 25 MMOL/L (ref 22–29)
COLOR UR: YELLOW
COMMENT: ABNORMAL
CREAT SERPL-MCNC: 2.1 MG/DL (ref 0.7–1.2)
EOSINOPHIL # BLD: 0.11 K/UL (ref 0.05–0.5)
EOSINOPHILS RELATIVE PERCENT: 2 % (ref 0–6)
ERYTHROCYTE [DISTWIDTH] IN BLOOD BY AUTOMATED COUNT: 14.6 % (ref 11.5–15)
GFR SERPL CREATININE-BSD FRML MDRD: 32 ML/MIN/1.73M2
GLUCOSE SERPL-MCNC: 236 MG/DL (ref 74–107)
GLUCOSE UR STRIP-MCNC: 500 MG/DL
HCT VFR BLD AUTO: 36.2 % (ref 37–54)
HGB BLD-MCNC: 11 G/DL (ref 12.5–16.5)
HGB UR QL STRIP.AUTO: NEGATIVE
KETONES UR STRIP-MCNC: NEGATIVE MG/DL
LEUKOCYTE ESTERASE UR QL STRIP: NEGATIVE
LV EF: 58 %
LVEF MODALITY: NORMAL
LYMPHOCYTES NFR BLD: 1.71 K/UL (ref 1.5–4)
LYMPHOCYTES RELATIVE PERCENT: 27 % (ref 20–42)
MCH RBC QN AUTO: 25.4 PG (ref 26–35)
MCHC RBC AUTO-ENTMCNC: 30.4 G/DL (ref 32–34.5)
MCV RBC AUTO: 83.6 FL (ref 80–99.9)
METER GLUCOSE: 203 MG/DL (ref 74–99)
METER GLUCOSE: 247 MG/DL (ref 74–99)
METER GLUCOSE: 274 MG/DL (ref 74–99)
METER GLUCOSE: 360 MG/DL (ref 74–99)
MONOCYTES NFR BLD: 0.28 K/UL (ref 0.1–0.95)
MONOCYTES NFR BLD: 4 % (ref 2–12)
MYELOCYTES ABSOLUTE COUNT: 0.06 K/UL
MYELOCYTES: 1 %
NEUTROPHILS NFR BLD: 65 % (ref 43–80)
NEUTS SEG NFR BLD: 4.09 K/UL (ref 1.8–7.3)
NITRITE UR QL STRIP: NEGATIVE
PH UR STRIP: 5.5 [PH] (ref 5–9)
PLATELET # BLD AUTO: 84 K/UL (ref 130–450)
PLATELET CONFIRMATION: NORMAL
PMV BLD AUTO: 10.4 FL (ref 7–12)
POTASSIUM SERPL-SCNC: 4.1 MMOL/L (ref 3.5–5)
PROT SERPL-MCNC: 6.5 G/DL (ref 6.4–8.3)
PROT UR STRIP-MCNC: NEGATIVE MG/DL
RBC # BLD AUTO: 4.33 M/UL (ref 3.8–5.8)
SODIUM SERPL-SCNC: 136 MMOL/L (ref 132–146)
SP GR UR STRIP: 1.02 (ref 1–1.03)
UROBILINOGEN UR STRIP-ACNC: 0.2 EU/DL (ref 0–1)
WBC OTHER # BLD: 6.3 K/UL (ref 4.5–11.5)

## 2023-07-25 PROCEDURE — C8929 TTE W OR WO FOL WCON,DOPPLER: HCPCS

## 2023-07-25 PROCEDURE — 6370000000 HC RX 637 (ALT 250 FOR IP): Performed by: INTERNAL MEDICINE

## 2023-07-25 PROCEDURE — 2580000003 HC RX 258: Performed by: INTERNAL MEDICINE

## 2023-07-25 PROCEDURE — 94660 CPAP INITIATION&MGMT: CPT

## 2023-07-25 PROCEDURE — 99233 SBSQ HOSP IP/OBS HIGH 50: CPT | Performed by: INTERNAL MEDICINE

## 2023-07-25 PROCEDURE — 36415 COLL VENOUS BLD VENIPUNCTURE: CPT

## 2023-07-25 PROCEDURE — 82947 ASSAY GLUCOSE BLOOD QUANT: CPT

## 2023-07-25 PROCEDURE — G0378 HOSPITAL OBSERVATION PER HR: HCPCS

## 2023-07-25 PROCEDURE — 6360000004 HC RX CONTRAST MEDICATION: Performed by: NURSE PRACTITIONER

## 2023-07-25 PROCEDURE — 6360000002 HC RX W HCPCS: Performed by: INTERNAL MEDICINE

## 2023-07-25 PROCEDURE — 76775 US EXAM ABDO BACK WALL LIM: CPT

## 2023-07-25 PROCEDURE — 80053 COMPREHEN METABOLIC PANEL: CPT

## 2023-07-25 PROCEDURE — 81003 URINALYSIS AUTO W/O SCOPE: CPT

## 2023-07-25 PROCEDURE — 85027 COMPLETE CBC AUTOMATED: CPT

## 2023-07-25 PROCEDURE — 94640 AIRWAY INHALATION TREATMENT: CPT

## 2023-07-25 RX ORDER — METOPROLOL SUCCINATE 25 MG/1
25 TABLET, EXTENDED RELEASE ORAL NIGHTLY
Qty: 30 TABLET | Refills: 3 | Status: SHIPPED | OUTPATIENT
Start: 2023-07-25 | End: 2023-07-26 | Stop reason: SDUPTHER

## 2023-07-25 RX ORDER — ISOSORBIDE MONONITRATE 30 MG/1
30 TABLET, EXTENDED RELEASE ORAL DAILY
Status: DISCONTINUED | OUTPATIENT
Start: 2023-07-25 | End: 2023-07-26 | Stop reason: HOSPADM

## 2023-07-25 RX ADMIN — TAMSULOSIN HYDROCHLORIDE 0.4 MG: 0.4 CAPSULE ORAL at 20:15

## 2023-07-25 RX ADMIN — ARFORMOTEROL TARTRATE 15 MCG: 15 SOLUTION RESPIRATORY (INHALATION) at 18:26

## 2023-07-25 RX ADMIN — INSULIN LISPRO 2 UNITS: 100 INJECTION, SOLUTION INTRAVENOUS; SUBCUTANEOUS at 12:47

## 2023-07-25 RX ADMIN — SODIUM CHLORIDE, PRESERVATIVE FREE 10 ML: 5 INJECTION INTRAVENOUS at 20:21

## 2023-07-25 RX ADMIN — METOPROLOL SUCCINATE 25 MG: 25 TABLET, EXTENDED RELEASE ORAL at 20:16

## 2023-07-25 RX ADMIN — FLUTICASONE PROPIONATE 1 SPRAY: 50 SPRAY, METERED NASAL at 09:38

## 2023-07-25 RX ADMIN — DULOXETINE HYDROCHLORIDE 20 MG: 20 CAPSULE, DELAYED RELEASE ORAL at 20:15

## 2023-07-25 RX ADMIN — AMIODARONE HYDROCHLORIDE 200 MG: 200 TABLET ORAL at 09:38

## 2023-07-25 RX ADMIN — ARFORMOTEROL TARTRATE 15 MCG: 15 SOLUTION RESPIRATORY (INHALATION) at 06:39

## 2023-07-25 RX ADMIN — FLUTICASONE PROPIONATE 1 SPRAY: 50 SPRAY, METERED NASAL at 20:20

## 2023-07-25 RX ADMIN — CETIRIZINE HYDROCHLORIDE 10 MG: 10 TABLET, FILM COATED ORAL at 09:38

## 2023-07-25 RX ADMIN — ASPIRIN 81 MG: 81 TABLET, COATED ORAL at 20:16

## 2023-07-25 RX ADMIN — PERFLUTREN 1.2 ML: 6.52 INJECTION, SUSPENSION INTRAVENOUS at 08:34

## 2023-07-25 RX ADMIN — PANTOPRAZOLE SODIUM 20 MG: 20 TABLET, DELAYED RELEASE ORAL at 20:16

## 2023-07-25 RX ADMIN — PANTOPRAZOLE SODIUM 20 MG: 20 TABLET, DELAYED RELEASE ORAL at 09:44

## 2023-07-25 RX ADMIN — PRAVASTATIN SODIUM 20 MG: 20 TABLET ORAL at 20:15

## 2023-07-25 RX ADMIN — POLYETHYLENE GLYCOL 3350 17 G: 17 POWDER, FOR SOLUTION ORAL at 09:38

## 2023-07-25 RX ADMIN — TORSEMIDE 20 MG: 20 TABLET ORAL at 09:38

## 2023-07-25 RX ADMIN — BUDESONIDE INHALATION 500 MCG: 0.5 SUSPENSION RESPIRATORY (INHALATION) at 06:39

## 2023-07-25 RX ADMIN — LEVOTHYROXINE SODIUM 50 MCG: 0.05 TABLET ORAL at 06:40

## 2023-07-25 RX ADMIN — BUDESONIDE INHALATION 500 MCG: 0.5 SUSPENSION RESPIRATORY (INHALATION) at 18:26

## 2023-07-25 RX ADMIN — INSULIN LISPRO 4 UNITS: 100 INJECTION, SOLUTION INTRAVENOUS; SUBCUTANEOUS at 09:45

## 2023-07-25 RX ADMIN — INSULIN GLARGINE 36 UNITS: 100 INJECTION, SOLUTION SUBCUTANEOUS at 20:17

## 2023-07-25 RX ADMIN — ISOSORBIDE MONONITRATE 30 MG: 30 TABLET, EXTENDED RELEASE ORAL at 17:59

## 2023-07-25 RX ADMIN — INSULIN LISPRO 4 UNITS: 100 INJECTION, SOLUTION INTRAVENOUS; SUBCUTANEOUS at 20:17

## 2023-07-25 RX ADMIN — INSULIN LISPRO 2 UNITS: 100 INJECTION, SOLUTION INTRAVENOUS; SUBCUTANEOUS at 17:37

## 2023-07-25 RX ADMIN — RIVAROXABAN 15 MG: 15 TABLET, FILM COATED ORAL at 17:37

## 2023-07-25 RX ADMIN — SODIUM CHLORIDE, PRESERVATIVE FREE 10 ML: 5 INJECTION INTRAVENOUS at 09:51

## 2023-07-25 ASSESSMENT — PAIN DESCRIPTION - DESCRIPTORS: DESCRIPTORS: ACHING;DISCOMFORT

## 2023-07-25 ASSESSMENT — PAIN SCALES - WONG BAKER
WONGBAKER_NUMERICALRESPONSE: 0
WONGBAKER_NUMERICALRESPONSE: 0

## 2023-07-25 ASSESSMENT — PAIN SCALES - GENERAL: PAINLEVEL_OUTOF10: 3

## 2023-07-25 ASSESSMENT — PAIN DESCRIPTION - LOCATION: LOCATION: CHEST

## 2023-07-25 ASSESSMENT — PAIN DESCRIPTION - ONSET: ONSET: ON-GOING

## 2023-07-25 ASSESSMENT — PAIN DESCRIPTION - FREQUENCY: FREQUENCY: INTERMITTENT

## 2023-07-25 ASSESSMENT — PAIN DESCRIPTION - PAIN TYPE: TYPE: ACUTE PAIN

## 2023-07-25 ASSESSMENT — PAIN DESCRIPTION - ORIENTATION: ORIENTATION: LEFT

## 2023-07-25 NOTE — PROGRESS NOTES
Notified cardiology of platelet count of 84, per nahomi Mo to give scheduled Xarelto. Electronically signed by Everton Mccauley RN on 7/25/2023 at 4:55 PM

## 2023-07-25 NOTE — PROGRESS NOTES
07/25/2023 03:40 AM    LABALBU 5.0 04/23/2012 07:00 AM    CALCIUM 8.9 07/25/2023 03:40 AM    BILITOT 0.6 07/25/2023 03:40 AM    ALKPHOS 61 07/25/2023 03:40 AM    AST 17 07/25/2023 03:40 AM    ALT 15 07/25/2023 03:40 AM       ASSESSMENT/PLAN:  Chest pain with last cardiac catheterization in 2018 did not show significant CAD. MODE versus CKD  Atrial fibrillation  Thrombocyteoniea   Mild pulm hypertension  Diabetes mellitus type 2  Hyperlipidemia  Hypertension  Sleep apena with cpap  History of tobacco abuse    Patient presented with chest pain. Patient given aspirin. Patient will be admitted to telemetry. We will monitor for chest pain and changes in heart rhythm. Stress test ordered. Cardiology consulted.     Home medications reviewed  Pending lab work this morning    Patient most likely has chronic kidney disease [facility]but since patient is still here we will check a renal ultrasound and UA before discharge    Discharge home when okay with    Andre Face, DO  10:11 AM  7/25/2023

## 2023-07-25 NOTE — PROGRESS NOTES
Perfect serve message sent to cardiology, Dr. Rober Osuna, regarding patient diet. Perfect serve message read with no response. No new orders at this time. Electronically signed by Heather Akins RN on 7/25/2023 at 10:30 AM

## 2023-07-26 VITALS
RESPIRATION RATE: 18 BRPM | OXYGEN SATURATION: 96 % | HEART RATE: 60 BPM | TEMPERATURE: 97.9 F | DIASTOLIC BLOOD PRESSURE: 65 MMHG | BODY MASS INDEX: 35.21 KG/M2 | HEIGHT: 72 IN | WEIGHT: 260 LBS | SYSTOLIC BLOOD PRESSURE: 111 MMHG

## 2023-07-26 LAB
ALBUMIN SERPL-MCNC: 4.2 G/DL (ref 3.5–5.2)
ALP SERPL-CCNC: 63 U/L (ref 40–129)
ALT SERPL-CCNC: 16 U/L (ref 0–40)
ANION GAP SERPL CALCULATED.3IONS-SCNC: 12 MMOL/L (ref 7–16)
AST SERPL-CCNC: 20 U/L (ref 0–39)
BASOPHILS # BLD: 0.02 K/UL (ref 0–0.2)
BASOPHILS NFR BLD: 0 % (ref 0–2)
BILIRUB SERPL-MCNC: 0.6 MG/DL (ref 0–1.2)
BUN SERPL-MCNC: 25 MG/DL (ref 6–23)
CALCIUM SERPL-MCNC: 9.1 MG/DL (ref 8.6–10.2)
CHLORIDE SERPL-SCNC: 100 MMOL/L (ref 98–107)
CO2 SERPL-SCNC: 27 MMOL/L (ref 22–29)
CREAT SERPL-MCNC: 2.2 MG/DL (ref 0.7–1.2)
EOSINOPHIL # BLD: 0.06 K/UL (ref 0.05–0.5)
EOSINOPHILS RELATIVE PERCENT: 1 % (ref 0–6)
ERYTHROCYTE [DISTWIDTH] IN BLOOD BY AUTOMATED COUNT: 14.9 % (ref 11.5–15)
GFR SERPL CREATININE-BSD FRML MDRD: 30 ML/MIN/1.73M2
GLUCOSE SERPL-MCNC: 244 MG/DL (ref 74–99)
HBA1C MFR BLD: 7.3 % (ref 4–5.6)
HCT VFR BLD AUTO: 36.3 % (ref 37–54)
HGB BLD-MCNC: 10.8 G/DL (ref 12.5–16.5)
IMM GRANULOCYTES # BLD AUTO: 0.03 K/UL (ref 0–0.58)
IMM GRANULOCYTES NFR BLD: 1 % (ref 0–5)
LYMPHOCYTES NFR BLD: 2.53 K/UL (ref 1.5–4)
LYMPHOCYTES RELATIVE PERCENT: 38 % (ref 20–42)
MCH RBC QN AUTO: 25.3 PG (ref 26–35)
MCHC RBC AUTO-ENTMCNC: 29.8 G/DL (ref 32–34.5)
MCV RBC AUTO: 85 FL (ref 80–99.9)
METER GLUCOSE: 248 MG/DL (ref 74–99)
METER GLUCOSE: 299 MG/DL (ref 74–99)
MONOCYTES NFR BLD: 0.5 K/UL (ref 0.1–0.95)
MONOCYTES NFR BLD: 8 % (ref 2–12)
NEUTROPHILS NFR BLD: 53 % (ref 43–80)
NEUTS SEG NFR BLD: 3.5 K/UL (ref 1.8–7.3)
PLATELET # BLD AUTO: 86 K/UL (ref 130–450)
PLATELET CONFIRMATION: NORMAL
PMV BLD AUTO: 10.1 FL (ref 7–12)
POTASSIUM SERPL-SCNC: 4.5 MMOL/L (ref 3.5–5)
PROT SERPL-MCNC: 6.5 G/DL (ref 6.4–8.3)
RBC # BLD AUTO: 4.27 M/UL (ref 3.8–5.8)
RBC # BLD: ABNORMAL 10*6/UL
RBC # BLD: ABNORMAL 10*6/UL
SODIUM SERPL-SCNC: 139 MMOL/L (ref 132–146)
WBC OTHER # BLD: 6.6 K/UL (ref 4.5–11.5)

## 2023-07-26 PROCEDURE — 6360000002 HC RX W HCPCS: Performed by: INTERNAL MEDICINE

## 2023-07-26 PROCEDURE — 85027 COMPLETE CBC AUTOMATED: CPT

## 2023-07-26 PROCEDURE — 94660 CPAP INITIATION&MGMT: CPT

## 2023-07-26 PROCEDURE — APPSS60 APP SPLIT SHARED TIME 46-60 MINUTES: Performed by: NURSE PRACTITIONER

## 2023-07-26 PROCEDURE — 36415 COLL VENOUS BLD VENIPUNCTURE: CPT

## 2023-07-26 PROCEDURE — 83036 HEMOGLOBIN GLYCOSYLATED A1C: CPT

## 2023-07-26 PROCEDURE — 6370000000 HC RX 637 (ALT 250 FOR IP): Performed by: INTERNAL MEDICINE

## 2023-07-26 PROCEDURE — 94640 AIRWAY INHALATION TREATMENT: CPT

## 2023-07-26 PROCEDURE — 99233 SBSQ HOSP IP/OBS HIGH 50: CPT | Performed by: INTERNAL MEDICINE

## 2023-07-26 PROCEDURE — 80053 COMPREHEN METABOLIC PANEL: CPT

## 2023-07-26 PROCEDURE — G0378 HOSPITAL OBSERVATION PER HR: HCPCS

## 2023-07-26 PROCEDURE — 2580000003 HC RX 258: Performed by: INTERNAL MEDICINE

## 2023-07-26 PROCEDURE — 82947 ASSAY GLUCOSE BLOOD QUANT: CPT

## 2023-07-26 RX ORDER — ISOSORBIDE MONONITRATE 30 MG/1
30 TABLET, EXTENDED RELEASE ORAL DAILY
Qty: 30 TABLET | Refills: 3 | Status: SHIPPED | OUTPATIENT
Start: 2023-07-27 | End: 2023-07-26 | Stop reason: SDUPTHER

## 2023-07-26 RX ORDER — METOPROLOL SUCCINATE 25 MG/1
25 TABLET, EXTENDED RELEASE ORAL NIGHTLY
Qty: 30 TABLET | Refills: 0 | Status: SHIPPED | OUTPATIENT
Start: 2023-07-26

## 2023-07-26 RX ORDER — ISOSORBIDE MONONITRATE 30 MG/1
30 TABLET, EXTENDED RELEASE ORAL DAILY
Qty: 30 TABLET | Refills: 0 | Status: SHIPPED | OUTPATIENT
Start: 2023-07-27

## 2023-07-26 RX ADMIN — RIVAROXABAN 15 MG: 15 TABLET, FILM COATED ORAL at 16:45

## 2023-07-26 RX ADMIN — PANTOPRAZOLE SODIUM 20 MG: 20 TABLET, DELAYED RELEASE ORAL at 08:28

## 2023-07-26 RX ADMIN — SODIUM CHLORIDE, PRESERVATIVE FREE 10 ML: 5 INJECTION INTRAVENOUS at 08:28

## 2023-07-26 RX ADMIN — CETIRIZINE HYDROCHLORIDE 10 MG: 10 TABLET, FILM COATED ORAL at 08:27

## 2023-07-26 RX ADMIN — BUDESONIDE INHALATION 500 MCG: 0.5 SUSPENSION RESPIRATORY (INHALATION) at 06:23

## 2023-07-26 RX ADMIN — FLUTICASONE PROPIONATE 1 SPRAY: 50 SPRAY, METERED NASAL at 08:33

## 2023-07-26 RX ADMIN — AMIODARONE HYDROCHLORIDE 200 MG: 200 TABLET ORAL at 08:28

## 2023-07-26 RX ADMIN — LEVOTHYROXINE SODIUM 50 MCG: 0.05 TABLET ORAL at 05:23

## 2023-07-26 RX ADMIN — TORSEMIDE 20 MG: 20 TABLET ORAL at 08:28

## 2023-07-26 RX ADMIN — ISOSORBIDE MONONITRATE 30 MG: 30 TABLET, EXTENDED RELEASE ORAL at 08:27

## 2023-07-26 RX ADMIN — INSULIN LISPRO 2 UNITS: 100 INJECTION, SOLUTION INTRAVENOUS; SUBCUTANEOUS at 08:32

## 2023-07-26 RX ADMIN — ARFORMOTEROL TARTRATE 15 MCG: 15 SOLUTION RESPIRATORY (INHALATION) at 06:23

## 2023-07-26 RX ADMIN — INSULIN LISPRO 2 UNITS: 100 INJECTION, SOLUTION INTRAVENOUS; SUBCUTANEOUS at 12:32

## 2023-07-26 ASSESSMENT — PAIN SCALES - GENERAL
PAINLEVEL_OUTOF10: 0
PAINLEVEL_OUTOF10: 0

## 2023-07-26 ASSESSMENT — PAIN SCALES - WONG BAKER: WONGBAKER_NUMERICALRESPONSE: 0

## 2023-07-26 NOTE — PROGRESS NOTES
Spoke with Dr. Lauren comer for discharge from cardiology standpoint. Electronically signed by Remy Ambriz RN on 7/26/2023 at 4:25 PM

## 2023-07-26 NOTE — PROGRESS NOTES
trace tricuspid regurgitation. RVSP is 32 mmHg. Pulmonary hypertension is mild . Myocardial perfusion stress test July 25, 2023  Gated SPECT left ventricular ejection fraction was calculated to be 75  %, with hypokinesis of the mid to basal inferolateral wall. TID ratio  1. 16. IMPRESSION:  1. ECG during the infusion did not change. 2.  The myocardial perfusion imaging was abnormal.  3.  The abnormality was a small sized mild fixed defect involving the  mid to basal inferolateral wall and also small sized, mild defect  involving the apex and distal septal walls suggestive of prior  infarct. However, underlying attenuation artifact cannot be ruled out. 4.  Overall left ventricular systolic function was normal with  regional wall motion abnormalities. 5.  No transient ischemic dilatation. 6.  Intermediate risk general pharmacologic stress test.    Transthoracic echocardiogram December 25, 2023   Summary   Technically difficult examination. Micro-bubble contrast injected to enhance left ventricular visualization. Estimated left ventricle ejection fraction 55 to 60 %. Mild to moderate left ventricular concentric hypertrophy noted. There is doppler evidence of stage I diastolic dysfunction. Normal right ventricular size and function. Mild tricuspid regurgitation. Assessment  Atypical chest pain  Paroxysmal atrial fibrillation  Moderate LVH  Acute on chronic kidney injury  Obesity  Tobacco abuse  Hypertension  Hyperlipidemia  Type 2 diabetes, insulin requiring  Anemia and iron deficiency  Stress test in 2015  Paroxysmal atrial fibrillation chronic anticoagulation for history of cardioversions  Obstructive sleep apnea not fully compliant  Chronic kidney disease with a baseline creatinine 1.3-1.5  Thrombocytopenia, chronic     Plan:   Stress test showed no ischemia but small scar was noted for which artifact could not be excluded.   Patient prefer medical therapy   He has tolerated addition of Imdur and denies any symptoms and wished to be discharged and follow-up with his cardiologist.  Continue aspirin, statin, Imdur and beta-blocker  Continue on beta-blocker and Xarelto for A-fib  Echo shows normal systolic function  Hold Demadex and avoid other nephrotoxic agents given kidney dysfunction  Consider nephrology consultation if worsening kidney function  Follow-up with your cardiologist in the outpatient      Gold Diaz MD  [facility]) Cardiology

## 2023-07-26 NOTE — CARE COORDINATION
7/26/23 1313 CM note: no covid testing this admit. OBSERVATION. Room air. Stress test (-) 7/24/23. Echo done. Discharge plan is home and pt declines need for HHC. His DME includes cane, rollator, shower chair (he doesn't use), kleber 2 freestyle glucose monitoring system, and cpap and nebulizer he obtained through the [facility]. Pt resides with his daughter Sarah Trejo, her son, and son's girlfriend. Pts daughter will provide transportation home. CM will follow.  Electronically signed by Satish Karimi RN on 7/26/2023 at 1:16 PM

## 2023-07-26 NOTE — PROGRESS NOTES
Patient family member, Sara Solis , voiced concerns over newly prescribed medications Imdur and metoprolol are unable to fill at [address]. The family member voiced  if the doctor could write a script for these medications to fill at their local pharmacy. Call out and spoke with Paige Hoskins NP, regarding family member's concern. Medication scripts signed by NP and placed in discharge folder. Family at bedside , all personal belongings present and accounted for from this admission. Belongings gathered by patient family member, patient verbalized all items present , and taken to vehicle by family member.

## 2023-07-26 NOTE — DISCHARGE SUMMARY
Department of Internal Medicine      PCP: Sixto Mai  Admitting Physician: Dr. Earleen Closs  Consultants:  Date of Service:    CHIEF COMPLAINT:  7/23/2023    HISTORY OF PRESENT ILLNESS:    Patient is 41-year-old male who presented to the ED with chest pain. States that it started earlier today. Describes it as sharp in nature and radiates to left upper extremity. States they last a few minutes at a time. Nothing makes it worse or better. He denies any associated shortness of breath or lightheadedness or dizziness. Patient does admit to associated headache. Patient was at home with daughter. He uses walker outside of home. 7/24/2023  Patient seen and examined on PCU. Patient had IV Lexiscan stress test this morning. The initial Lexiscan portion was negative. Pending lab work this morning. Patient's family is at bedside and case discussed. Patient denies any current chest pain. Patient is hard of hearing. Blood sugars range 1 . Temperature 98.5 heart rate 67 blood pressure 1 [birth year]/4/1973. O2 sat 99% room air at rest.  Patient daughter is at bedside and states the patient does follow-up with [facility]. She denies knowing that he has a nephrologist with the [place]. She denies any history of known chronic kidney disease. Patient is to have his lab work drawn this morning    7/25/2020  Patient seen examined on PCU. Patient denies any chest pain, abdominal pain, dizziness or palpitations. BUN/creatinine is 24/2.1 with blood sugars range 23 6-274. Liver enzymes normal with WBC 6.3. Platelets 84 which is chronic. Temperature is 98.5 with heart rate 60 blood pressure 156/73. O2 sat 98% on room air at rest.  Patient stress test yesterday reviewed images show EF 75% also mention a hypokinesis in the mid/basal inferior wall. There is no reversible ischemia noted with small size fixed defects noted. Patient has echocardiogram ordered today.   Discharge home when okay with daily (before meals) *Per Sliding Scale*  Give 1 extra unit for every 50 points >150   Yes Historical Provider, MD   pantoprazole (PROTONIX) 40 MG tablet Take 1 tablet by mouth daily   Yes Historical Provider, MD   rivaroxaban (XARELTO) 15 MG TABS tablet Take 1 tablet by mouth Daily with supper   Yes Historical Provider, MD   CPAP Machine MISC by Does not apply route nightly   Yes Historical Provider, MD   acetaminophen (TYLENOL) 325 MG tablet Take 1 tablet by mouth every 6 hours as needed for Pain   Yes Historical Provider, MD   nitroGLYCERIN (NITROSTAT) 0.4 MG SL tablet Place 1 tablet under the tongue every 5 minutes as needed for Chest pain up to max of 3 total doses. If no relief after 1 dose, call 911. Yes Historical Provider, MD   fluocinonide (LIDEX) 0.05 % cream Apply 1 each topically 2 times daily Apply topically to rash twice daily. Yes Historical Provider, MD   ferrous sulfate (IRON 325) 325 (65 Fe) MG tablet Take 1 tablet by mouth daily (with breakfast)   Yes Historical Provider, MD   DULoxetine (CYMBALTA) 20 MG extended release capsule Take 1 capsule by mouth nightly   Yes Historical Provider, MD   ammonium lactate (AMLACTIN) 12 % cream Apply 1 g topically 2 times daily Apply topically as needed.    Yes Historical Provider, MD   simethicone (MYLICON) 80 MG chewable tablet Take 1 tablet by mouth 4 times daily as needed for Flatulence   Yes Historical Provider, MD   melatonin 3 MG TABS tablet Take 1 tablet by mouth nightly   Yes Historical Provider, MD   levothyroxine (SYNTHROID) 50 MCG tablet Take 1 tablet by mouth every morning (before breakfast)   Yes Historical Provider, MD   vitamin B-12 (CYANOCOBALAMIN) 1000 MCG tablet Take 1 tablet by mouth daily   Yes Historical Provider, MD   Capsaicin 0.1 % CREA Apply 1 each topically 3 times daily   Yes Historical Provider, MD   benzonatate (TESSALON) 100 MG capsule Take 1 capsule by mouth 3 times daily as needed for Cough   Yes Historical Provider, MD

## 2023-07-27 ENCOUNTER — TELEPHONE (OUTPATIENT)
Dept: CARDIOLOGY CLINIC | Age: 78
End: 2023-07-27

## 2023-07-27 LAB — HBA1C MFR BLD: 7.2 % (ref 4–5.6)

## 2023-07-27 NOTE — TELEPHONE ENCOUNTER
Spoke with patient's daughter, Avelina Beasley, to schedule HFU with Dr. Torres Moore. She states that patient ill continue to follow with [facility] for his cardiology needs.

## 2023-10-27 ENCOUNTER — HOSPITAL ENCOUNTER (OUTPATIENT)
Age: 78
Discharge: HOME | End: 2023-10-27
Payer: OTHER GOVERNMENT

## 2023-10-27 LAB
25(OH)D3 SERPL-MCNC: 41.9 NG/ML (ref 30–100)
ALBUMIN SERPL-MCNC: 4 G/DL (ref 3.5–5.2)
ALP SERPL-CCNC: 70 U/L (ref 40–129)
ALT SERPL-CCNC: 29 U/L (ref 0–40)
ANION GAP SERPL CALCULATED.3IONS-SCNC: 12 MMOL/L (ref 7–16)
AST SERPL-CCNC: 27 U/L (ref 0–39)
BILIRUB SERPL-MCNC: 0.7 MG/DL (ref 0–1.2)
BUN SERPL-MCNC: 20 MG/DL (ref 6–23)
CALCIUM SERPL-MCNC: 9 MG/DL (ref 8.6–10.2)
CHLORIDE SERPL-SCNC: 104 MMOL/L (ref 98–107)
CO2 SERPL-SCNC: 26 MMOL/L (ref 22–29)
CREAT SERPL-MCNC: 1.7 MG/DL (ref 0.7–1.2)
ERYTHROCYTE [DISTWIDTH] IN BLOOD BY AUTOMATED COUNT: 14.8 % (ref 11.5–15)
GFR SERPL CREATININE-BSD FRML MDRD: 39 ML/MIN/1.73M2
GLUCOSE SERPL-MCNC: 180 MG/DL (ref 74–99)
HCT VFR BLD AUTO: 47.7 % (ref 37–54)
HGB BLD-MCNC: 16 G/DL (ref 12.5–16.5)
MCH RBC QN AUTO: 32.5 PG (ref 26–35)
MCHC RBC AUTO-ENTMCNC: 33.5 G/DL (ref 32–34.5)
MCV RBC AUTO: 96.8 FL (ref 80–99.9)
PHOSPHATE SERPL-MCNC: 3.3 MG/DL (ref 2.5–4.5)
PLATELET CONFIRMATION: NORMAL
PLATELET, FLUORESCENCE: 72 K/UL (ref 130–450)
PMV BLD AUTO: 9.8 FL (ref 7–12)
POTASSIUM SERPL-SCNC: 5 MMOL/L (ref 3.5–5)
PROT SERPL-MCNC: 6.6 G/DL (ref 6.4–8.3)
PTH-INTACT SERPL-MCNC: 37.2 PG/ML (ref 15–65)
RBC # BLD AUTO: 4.93 M/UL (ref 3.8–5.8)
SODIUM SERPL-SCNC: 142 MMOL/L (ref 132–146)
WBC OTHER # BLD: 6.6 K/UL (ref 4.5–11.5)

## 2023-10-27 PROCEDURE — 80053 COMPREHEN METABOLIC PANEL: CPT

## 2023-10-27 PROCEDURE — 85027 COMPLETE CBC AUTOMATED: CPT

## 2023-10-27 PROCEDURE — 83970 ASSAY OF PARATHORMONE: CPT

## 2023-10-27 PROCEDURE — 84100 ASSAY OF PHOSPHORUS: CPT

## 2023-10-27 PROCEDURE — 36415 COLL VENOUS BLD VENIPUNCTURE: CPT

## 2023-10-27 PROCEDURE — 82306 VITAMIN D 25 HYDROXY: CPT

## 2023-11-14 ENCOUNTER — HOSPITAL ENCOUNTER (OUTPATIENT)
Dept: ULTRASOUND IMAGING | Age: 78
Discharge: HOME | End: 2023-11-14
Attending: INTERNAL MEDICINE
Payer: OTHER GOVERNMENT

## 2023-11-14 DIAGNOSIS — N18.30 STAGE 3 CHRONIC KIDNEY DISEASE, UNSPECIFIED WHETHER STAGE 3A OR 3B CKD (HCC): ICD-10-CM

## 2023-11-14 PROCEDURE — 76770 US EXAM ABDO BACK WALL COMP: CPT

## 2023-11-20 ENCOUNTER — APPOINTMENT (OUTPATIENT)
Dept: GENERAL RADIOLOGY | Age: 78
End: 2023-11-20
Payer: OTHER GOVERNMENT

## 2023-11-20 ENCOUNTER — HOSPITAL ENCOUNTER (EMERGENCY)
Age: 78
Discharge: HOME | End: 2023-11-20
Payer: OTHER GOVERNMENT

## 2023-11-20 VITALS
DIASTOLIC BLOOD PRESSURE: 86 MMHG | BODY MASS INDEX: 34.98 KG/M2 | HEART RATE: 66 BPM | OXYGEN SATURATION: 99 % | TEMPERATURE: 97.8 F | RESPIRATION RATE: 14 BRPM | WEIGHT: 257.94 LBS | SYSTOLIC BLOOD PRESSURE: 154 MMHG

## 2023-11-20 DIAGNOSIS — S61.411A LACERATION OF RIGHT HAND WITHOUT FOREIGN BODY, INITIAL ENCOUNTER: Primary | ICD-10-CM

## 2023-11-20 PROCEDURE — 90471 IMMUNIZATION ADMIN: CPT | Performed by: NURSE PRACTITIONER

## 2023-11-20 PROCEDURE — 6360000002 HC RX W HCPCS: Performed by: NURSE PRACTITIONER

## 2023-11-20 PROCEDURE — 6370000000 HC RX 637 (ALT 250 FOR IP)

## 2023-11-20 PROCEDURE — 90714 TD VACC NO PRESV 7 YRS+ IM: CPT | Performed by: NURSE PRACTITIONER

## 2023-11-20 PROCEDURE — 73562 X-RAY EXAM OF KNEE 3: CPT

## 2023-11-20 PROCEDURE — 99284 EMERGENCY DEPT VISIT MOD MDM: CPT

## 2023-11-20 PROCEDURE — 73130 X-RAY EXAM OF HAND: CPT

## 2023-11-20 PROCEDURE — 12002 RPR S/N/AX/GEN/TRNK2.6-7.5CM: CPT

## 2023-11-20 PROCEDURE — 96372 THER/PROPH/DIAG INJ SC/IM: CPT

## 2023-11-20 PROCEDURE — 2500000003 HC RX 250 WO HCPCS: Performed by: NURSE PRACTITIONER

## 2023-11-20 RX ORDER — LIDOCAINE HYDROCHLORIDE 10 MG/ML
2 INJECTION, SOLUTION INFILTRATION; PERINEURAL ONCE
Status: COMPLETED | OUTPATIENT
Start: 2023-11-20 | End: 2023-11-20

## 2023-11-20 RX ORDER — TETANUS AND DIPHTHERIA TOXOIDS ADSORBED 2; 2 [LF]/.5ML; [LF]/.5ML
0.5 INJECTION INTRAMUSCULAR ONCE
Status: COMPLETED | OUTPATIENT
Start: 2023-11-20 | End: 2023-11-20

## 2023-11-20 RX ORDER — BACITRACIN ZINC 500 [USP'U]/G
OINTMENT TOPICAL ONCE
Status: COMPLETED | OUTPATIENT
Start: 2023-11-20 | End: 2023-11-20

## 2023-11-20 RX ADMIN — TETANUS AND DIPHTHERIA TOXOIDS ADSORBED 0.5 ML: 2; 2 INJECTION INTRAMUSCULAR at 17:03

## 2023-11-20 RX ADMIN — BACITRACIN ZINC: 500 OINTMENT TOPICAL at 16:39

## 2023-11-20 RX ADMIN — LIDOCAINE HYDROCHLORIDE 2 ML: 10 INJECTION, SOLUTION INFILTRATION; PERINEURAL at 16:39

## 2023-11-20 ASSESSMENT — LIFESTYLE VARIABLES
HOW MANY STANDARD DRINKS CONTAINING ALCOHOL DO YOU HAVE ON A TYPICAL DAY: PATIENT DOES NOT DRINK
HOW OFTEN DO YOU HAVE A DRINK CONTAINING ALCOHOL: NEVER

## 2023-11-20 ASSESSMENT — PAIN DESCRIPTION - ORIENTATION
ORIENTATION: RIGHT
ORIENTATION: RIGHT

## 2023-11-20 ASSESSMENT — PAIN DESCRIPTION - LOCATION
LOCATION: HAND;KNEE
LOCATION: HAND

## 2023-11-20 ASSESSMENT — PAIN DESCRIPTION - FREQUENCY: FREQUENCY: CONTINUOUS

## 2023-11-20 ASSESSMENT — PAIN SCALES - GENERAL
PAINLEVEL_OUTOF10: 3
PAINLEVEL_OUTOF10: 10

## 2023-11-20 ASSESSMENT — PAIN - FUNCTIONAL ASSESSMENT
PAIN_FUNCTIONAL_ASSESSMENT: ACTIVITIES ARE NOT PREVENTED
PAIN_FUNCTIONAL_ASSESSMENT: 0-10
PAIN_FUNCTIONAL_ASSESSMENT: NONE - DENIES PAIN

## 2023-11-20 ASSESSMENT — PAIN DESCRIPTION - DESCRIPTORS
DESCRIPTORS: ACHING;SORE;THROBBING;TENDER
DESCRIPTORS: ACHING;TENDER;SORE;DISCOMFORT

## 2023-11-20 ASSESSMENT — PAIN DESCRIPTION - ONSET: ONSET: ON-GOING

## 2023-11-20 ASSESSMENT — PAIN DESCRIPTION - PAIN TYPE: TYPE: ACUTE PAIN

## 2023-11-20 NOTE — ED PROVIDER NOTES
Independent [facility] Visit. Department of Emergency Medicine   ED  Provider Note  Admit Date/RoomTime: 11/20/2023  3:12 PM  ED Room: 10/10      HPI:  11/20/23, Time: 3:36 PM ASHLEY Huber is a 78 y.o. old male presenting to the emergency department for evaluation of a laceration to the right hand, caused by cabinet door, which occured prior to arrival.  States that a cabinet door fell towards him and cut his hand. States that this caused him to fall down and landed on his right knee. Patient reports this was an accident. There is not a possibility of retained foreign body in the affected area. The patients tetanus status is unknown. Patient wrapped the area & held pressure prior to arrival. Bleeding is  controlled. Patient complains of pain at injury site. The injury was not work related. Patient denies any other reported injuries. Denies numbness, tingling, or weakness. Patient has no other reported injuries. States he did not hit his head when he fell. No loss of consciousness occurred. Denies headache, back pain, neck pain, abdominal pain, NVD, F/C, back pain, flank pain. He is on Xarelto. Review of Systems:   Pertinent positives and negatives are stated within HPI, all other systems reviewed and are negative.        --------------------------------------------- PAST HISTORY ---------------------------------------------  Past Medical History:  has a past medical history of Arthritis, Atrial fibrillation ([address]), Diabetes mellitus type 2, noninsulin dependent ([address]), Dyspnea, H/O cardiovascular stress test, History of cardiovascular stress test, History of echocardiogram, Hyperlipidemia, Hypertension, and Preoperative clearance. Past Surgical History:  has a past surgical history that includes Cholecystectomy; cardiovascular stress test (08/11); Diagnostic Cardiac Cath Lab Procedure (11/8/2007); Diagnostic Cardiac Cath Lab Procedure (4/26/2010);  Cardiac catheterization (04/26/2012);

## 2023-11-23 ENCOUNTER — HOSPITAL ENCOUNTER (EMERGENCY)
Age: 78
Discharge: HOME | End: 2023-11-23
Payer: OTHER GOVERNMENT

## 2023-11-23 VITALS
HEART RATE: 68 BPM | DIASTOLIC BLOOD PRESSURE: 78 MMHG | OXYGEN SATURATION: 100 % | TEMPERATURE: 97.8 F | RESPIRATION RATE: 18 BRPM | SYSTOLIC BLOOD PRESSURE: 164 MMHG

## 2023-11-23 DIAGNOSIS — L03.113 CELLULITIS OF RIGHT HAND: Primary | ICD-10-CM

## 2023-11-23 DIAGNOSIS — Z48.02 VISIT FOR SUTURE REMOVAL: ICD-10-CM

## 2023-11-23 PROCEDURE — 99283 EMERGENCY DEPT VISIT LOW MDM: CPT

## 2023-11-23 PROCEDURE — 6370000000 HC RX 637 (ALT 250 FOR IP): Performed by: PHYSICIAN ASSISTANT

## 2023-11-23 RX ORDER — CEPHALEXIN 250 MG/1
500 CAPSULE ORAL ONCE
Status: COMPLETED | OUTPATIENT
Start: 2023-11-23 | End: 2023-11-23

## 2023-11-23 RX ORDER — CEPHALEXIN 500 MG/1
500 CAPSULE ORAL 4 TIMES DAILY
Qty: 28 CAPSULE | Refills: 0 | Status: SHIPPED | OUTPATIENT
Start: 2023-11-23 | End: 2023-11-30

## 2023-11-23 RX ORDER — SULFAMETHOXAZOLE AND TRIMETHOPRIM 800; 160 MG/1; MG/1
1 TABLET ORAL 2 TIMES DAILY
Qty: 14 TABLET | Refills: 0 | Status: SHIPPED | OUTPATIENT
Start: 2023-11-23 | End: 2023-11-30

## 2023-11-23 RX ORDER — CEPHALEXIN 500 MG/1
500 CAPSULE ORAL 3 TIMES DAILY
Qty: 21 CAPSULE | Refills: 0 | Status: SHIPPED | OUTPATIENT
Start: 2023-11-23 | End: 2023-11-23 | Stop reason: SDUPTHER

## 2023-11-23 RX ORDER — SULFAMETHOXAZOLE AND TRIMETHOPRIM 800; 160 MG/1; MG/1
1 TABLET ORAL ONCE
Status: COMPLETED | OUTPATIENT
Start: 2023-11-23 | End: 2023-11-23

## 2023-11-23 RX ADMIN — CEPHALEXIN 500 MG: 250 CAPSULE ORAL at 14:53

## 2023-11-23 RX ADMIN — SULFAMETHOXAZOLE AND TRIMETHOPRIM 1 TABLET: 800; 160 TABLET ORAL at 14:53

## 2023-11-23 ASSESSMENT — PAIN - FUNCTIONAL ASSESSMENT: PAIN_FUNCTIONAL_ASSESSMENT: 0-10

## 2023-11-23 ASSESSMENT — PAIN SCALES - GENERAL: PAINLEVEL_OUTOF10: 7

## 2023-11-23 NOTE — ED PROVIDER NOTES
Independent R[facility]HANA Visit. Department of Emergency Medicine   ED  Encounter Note  Admit Date/RoomTime: 2023  2:26 PM  ED Room: GILDARDO/GILDARDO    NAME: Gigi Angulo  : 1945  MRN: [id number]     Chief Complaint:  Wound Check (Had stitches, wants to see if infected)    History of Present Illness       Gigi Angulo is a 78 y.o. old male who presents to the emergency department by private vehicle for removal of sutures from dorsal aspect of right hand, which were placed 3 day(s) prior to arrival at an emergency department. Current antibiotic use: no.  Patient states that his daughter was concerned for infection due to redness and swelling of the hand which has been worsening since the injury occurred. He denies any fever, chills, night sweats, chest pain, shortness of breath, nausea, vomiting, diarrhea, or any other symptoms associated with his presenting complaint. Tetanus Status:  up to date. ROS   Pertinent positives and negatives are stated within HPI, all other systems reviewed and are negative. Past Medical History:  has a past medical history of Arthritis, Atrial fibrillation ([address]), Diabetes mellitus type 2, noninsulin dependent ([address]), Dyspnea, H/O cardiovascular stress test, History of cardiovascular stress test, History of echocardiogram, Hyperlipidemia, Hypertension, and Preoperative clearance. Surgical History:  has a past surgical history that includes Cholecystectomy; cardiovascular stress test (); Diagnostic Cardiac Cath Lab Procedure (2007); Diagnostic Cardiac Cath Lab Procedure (2010); Cardiac catheterization (2012); and Cardiac catheterization (10/12/2018). Social History:  reports that he quit smoking about 28 years ago. His smoking use included cigarettes. He has a 12.50 pack-year smoking history. He has never used smokeless tobacco. He reports that he does not drink alcohol and does not use drugs.     Family History: family history includes Cancer in his

## 2023-12-07 ENCOUNTER — HOSPITAL ENCOUNTER (OUTPATIENT)
Age: 78
Discharge: HOME | End: 2023-12-07
Payer: OTHER GOVERNMENT

## 2023-12-07 LAB
ANION GAP SERPL CALCULATED.3IONS-SCNC: 9 MMOL/L (ref 7–16)
BUN SERPL-MCNC: 24 MG/DL (ref 6–23)
CALCIUM SERPL-MCNC: 9.3 MG/DL (ref 8.6–10.2)
CHLORIDE SERPL-SCNC: 99 MMOL/L (ref 98–107)
CO2 SERPL-SCNC: 30 MMOL/L (ref 22–29)
CREAT SERPL-MCNC: 2.1 MG/DL (ref 0.7–1.2)
GFR SERPL CREATININE-BSD FRML MDRD: 32 ML/MIN/1.73M2
GLUCOSE SERPL-MCNC: 114 MG/DL (ref 74–99)
MAGNESIUM SERPL-MCNC: 2.2 MG/DL (ref 1.6–2.6)
POTASSIUM SERPL-SCNC: 4 MMOL/L (ref 3.5–5)
SODIUM SERPL-SCNC: 138 MMOL/L (ref 132–146)

## 2023-12-07 PROCEDURE — 80048 BASIC METABOLIC PNL TOTAL CA: CPT

## 2023-12-07 PROCEDURE — 36415 COLL VENOUS BLD VENIPUNCTURE: CPT

## 2023-12-07 PROCEDURE — 83735 ASSAY OF MAGNESIUM: CPT

## 2025-03-06 ENCOUNTER — APPOINTMENT (OUTPATIENT)
Dept: CT IMAGING | Age: 80
DRG: 378 | End: 2025-03-06
Payer: OTHER GOVERNMENT

## 2025-03-06 ENCOUNTER — HOSPITAL ENCOUNTER (INPATIENT)
Age: 80
LOS: 9 days | Discharge: HOME | DRG: 378 | End: 2025-03-15
Attending: STUDENT IN AN ORGANIZED HEALTH CARE EDUCATION/TRAINING PROGRAM | Admitting: INTERNAL MEDICINE
Payer: OTHER GOVERNMENT

## 2025-03-06 ENCOUNTER — APPOINTMENT (OUTPATIENT)
Dept: GENERAL RADIOLOGY | Age: 80
DRG: 378 | End: 2025-03-06
Payer: OTHER GOVERNMENT

## 2025-03-06 DIAGNOSIS — K92.0 GASTROINTESTINAL HEMORRHAGE WITH HEMATEMESIS: Primary | ICD-10-CM
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
## 2025-03-06 DIAGNOSIS — D64.9 ANEMIA, UNSPECIFIED TYPE: ICD-10-CM

## 2025-03-06 DIAGNOSIS — K31.89 GASTRIC MASS: ICD-10-CM

## 2025-03-06 LAB
ALBUMIN SERPL-MCNC: 3.7 G/DL (ref 3.5–5.2)
ALP SERPL-CCNC: 64 U/L (ref 40–129)
ALT SERPL-CCNC: 17 U/L (ref 0–40)
ANION GAP SERPL CALCULATED.3IONS-SCNC: 13 MMOL/L (ref 7–16)
AST SERPL-CCNC: 24 U/L (ref 0–39)
BASOPHILS # BLD: 0.04 K/UL (ref 0–0.2)
BASOPHILS NFR BLD: 0 % (ref 0–2)
BILIRUB SERPL-MCNC: 0.6 MG/DL (ref 0–1.2)
BUN SERPL-MCNC: 27 MG/DL (ref 6–23)
CALCIUM SERPL-MCNC: 8.5 MG/DL (ref 8.6–10.2)
CHLORIDE SERPL-SCNC: 101 MMOL/L (ref 98–107)
CO2 SERPL-SCNC: 23 MMOL/L (ref 22–29)
CREAT SERPL-MCNC: 2.3 MG/DL (ref 0.7–1.2)
EOSINOPHIL # BLD: 0.22 K/UL (ref 0.05–0.5)
EOSINOPHILS RELATIVE PERCENT: 2 % (ref 0–6)
ERYTHROCYTE [DISTWIDTH] IN BLOOD BY AUTOMATED COUNT: 13.6 % (ref 11.5–15)
GFR, ESTIMATED: 28 ML/MIN/1.73M2
GLUCOSE BLD-MCNC: 230 MG/DL (ref 74–99)
GLUCOSE SERPL-MCNC: 251 MG/DL (ref 74–99)
HCT VFR BLD AUTO: 33.2 % (ref 37–54)
HCT VFR BLD AUTO: 36.3 % (ref 37–54)
HGB BLD-MCNC: 11.1 G/DL (ref 12.5–16.5)
HGB BLD-MCNC: 12.4 G/DL (ref 12.5–16.5)
IMM GRANULOCYTES # BLD AUTO: 0.1 K/UL (ref 0–0.58)
IMM GRANULOCYTES NFR BLD: 1 % (ref 0–5)
LACTATE BLDV-SCNC: 3.5 MMOL/L (ref 0.5–2.2)
LIPASE SERPL-CCNC: 6 U/L (ref 13–60)
LYMPHOCYTES NFR BLD: 3.82 K/UL (ref 1.5–4)
LYMPHOCYTES RELATIVE PERCENT: 29 % (ref 20–42)
MCH RBC QN AUTO: 34.3 PG (ref 26–35)
MCHC RBC AUTO-ENTMCNC: 34.2 G/DL (ref 32–34.5)
MCV RBC AUTO: 100.3 FL (ref 80–99.9)
MONOCYTES NFR BLD: 0.62 K/UL (ref 0.1–0.95)
MONOCYTES NFR BLD: 5 % (ref 2–12)
NEUTROPHILS NFR BLD: 64 % (ref 43–80)
NEUTS SEG NFR BLD: 8.53 K/UL (ref 1.8–7.3)
PLATELET # BLD AUTO: 149 K/UL (ref 130–450)
PMV BLD AUTO: 9.6 FL (ref 7–12)
POTASSIUM SERPL-SCNC: 4.6 MMOL/L (ref 3.5–5)
PROT SERPL-MCNC: 5.7 G/DL (ref 6.4–8.3)
RBC # BLD AUTO: 3.62 M/UL (ref 3.8–5.8)
SODIUM SERPL-SCNC: 137 MMOL/L (ref 132–146)
TROPONIN I SERPL HS-MCNC: 19 NG/L (ref 0–11)
TROPONIN I SERPL HS-MCNC: 21 NG/L (ref 0–11)
WBC OTHER # BLD: 13.3 K/UL (ref 4.5–11.5)

## 2025-03-06 PROCEDURE — 86900 BLOOD TYPING SEROLOGIC ABO: CPT

## 2025-03-06 PROCEDURE — 2060000000 HC ICU INTERMEDIATE R&B

## 2025-03-06 PROCEDURE — 85018 HEMOGLOBIN: CPT

## 2025-03-06 PROCEDURE — 80053 COMPREHEN METABOLIC PANEL: CPT

## 2025-03-06 PROCEDURE — 86850 RBC ANTIBODY SCREEN: CPT

## 2025-03-06 PROCEDURE — 86923 COMPATIBILITY TEST ELECTRIC: CPT

## 2025-03-06 PROCEDURE — 6360000004 HC RX CONTRAST MEDICATION: Performed by: RADIOLOGY

## 2025-03-06 PROCEDURE — 83605 ASSAY OF LACTIC ACID: CPT

## 2025-03-06 PROCEDURE — 84484 ASSAY OF TROPONIN QUANT: CPT

## 2025-03-06 PROCEDURE — 74174 CTA ABD&PLVS W/CONTRAST: CPT

## 2025-03-06 PROCEDURE — 86920 COMPATIBILITY TEST SPIN: CPT

## 2025-03-06 PROCEDURE — 72125 CT NECK SPINE W/O DYE: CPT

## 2025-03-06 PROCEDURE — 2580000003 HC RX 258

## 2025-03-06 PROCEDURE — 86901 BLOOD TYPING SEROLOGIC RH(D): CPT

## 2025-03-06 PROCEDURE — 93005 ELECTROCARDIOGRAM TRACING: CPT

## 2025-03-06 PROCEDURE — 96375 TX/PRO/DX INJ NEW DRUG ADDON: CPT

## 2025-03-06 PROCEDURE — 83690 ASSAY OF LIPASE: CPT

## 2025-03-06 PROCEDURE — 6360000002 HC RX W HCPCS

## 2025-03-06 PROCEDURE — 99285 EMERGENCY DEPT VISIT HI MDM: CPT

## 2025-03-06 PROCEDURE — 96365 THER/PROPH/DIAG IV INF INIT: CPT

## 2025-03-06 PROCEDURE — 82962 GLUCOSE BLOOD TEST: CPT

## 2025-03-06 PROCEDURE — 71275 CT ANGIOGRAPHY CHEST: CPT

## 2025-03-06 PROCEDURE — 85014 HEMATOCRIT: CPT

## 2025-03-06 PROCEDURE — 70450 CT HEAD/BRAIN W/O DYE: CPT

## 2025-03-06 PROCEDURE — 85025 COMPLETE CBC W/AUTO DIFF WBC: CPT

## 2025-03-06 RX ORDER — FERROUS SULFATE 325(65) MG
325 TABLET ORAL
Status: DISCONTINUED | OUTPATIENT
Start: 2025-03-07 | End: 2025-03-15 | Stop reason: HOSPADM

## 2025-03-06 RX ORDER — ONDANSETRON 2 MG/ML
4 INJECTION INTRAMUSCULAR; INTRAVENOUS ONCE
Status: COMPLETED | OUTPATIENT
Start: 2025-03-06 | End: 2025-03-06

## 2025-03-06 RX ORDER — LANOLIN ALCOHOL/MO/W.PET/CERES
1000 CREAM (GRAM) TOPICAL DAILY
Status: DISCONTINUED | OUTPATIENT
Start: 2025-03-07 | End: 2025-03-06

## 2025-03-06 RX ORDER — TRANEXAMIC ACID 10 MG/ML
1000 INJECTION, SOLUTION INTRAVENOUS ONCE
Status: DISCONTINUED | OUTPATIENT
Start: 2025-03-06 | End: 2025-03-07

## 2025-03-06 RX ORDER — OCTREOTIDE ACETATE 50 UG/ML
50 INJECTION, SOLUTION INTRAVENOUS; SUBCUTANEOUS ONCE
Status: COMPLETED | OUTPATIENT
Start: 2025-03-06 | End: 2025-03-06

## 2025-03-06 RX ORDER — DULOXETIN HYDROCHLORIDE 30 MG/1
30 CAPSULE, DELAYED RELEASE ORAL NIGHTLY
Status: DISCONTINUED | OUTPATIENT
Start: 2025-03-06 | End: 2025-03-15 | Stop reason: HOSPADM

## 2025-03-06 RX ORDER — INSULIN GLARGINE 100 [IU]/ML
28 INJECTION, SOLUTION SUBCUTANEOUS 2 TIMES DAILY
Status: DISCONTINUED | OUTPATIENT
Start: 2025-03-06 | End: 2025-03-15 | Stop reason: HOSPADM

## 2025-03-06 RX ORDER — GLUCAGON 1 MG/ML
1 KIT INJECTION PRN
Status: DISCONTINUED | OUTPATIENT
Start: 2025-03-06 | End: 2025-03-15 | Stop reason: HOSPADM

## 2025-03-06 RX ORDER — ACETAMINOPHEN 325 MG/1
650 TABLET ORAL EVERY 6 HOURS PRN
Status: DISCONTINUED | OUTPATIENT
Start: 2025-03-06 | End: 2025-03-15 | Stop reason: HOSPADM

## 2025-03-06 RX ORDER — ALBUTEROL SULFATE 0.83 MG/ML
2.5 SOLUTION RESPIRATORY (INHALATION) EVERY 6 HOURS PRN
Status: DISCONTINUED | OUTPATIENT
Start: 2025-03-06 | End: 2025-03-15 | Stop reason: HOSPADM

## 2025-03-06 RX ORDER — INSULIN LISPRO 100 [IU]/ML
15 INJECTION, SOLUTION INTRAVENOUS; SUBCUTANEOUS
Status: DISCONTINUED | OUTPATIENT
Start: 2025-03-07 | End: 2025-03-06

## 2025-03-06 RX ORDER — IOPAMIDOL 755 MG/ML
75 INJECTION, SOLUTION INTRAVASCULAR
Status: COMPLETED | OUTPATIENT
Start: 2025-03-06 | End: 2025-03-06

## 2025-03-06 RX ORDER — PROCHLORPERAZINE EDISYLATE 5 MG/ML
10 INJECTION INTRAMUSCULAR; INTRAVENOUS EVERY 6 HOURS PRN
Status: DISCONTINUED | OUTPATIENT
Start: 2025-03-06 | End: 2025-03-12

## 2025-03-06 RX ORDER — SODIUM CHLORIDE 9 MG/ML
50 INJECTION, SOLUTION INTRAVENOUS ONCE
Status: COMPLETED | OUTPATIENT
Start: 2025-03-06 | End: 2025-03-06

## 2025-03-06 RX ORDER — DEXTROSE MONOHYDRATE 100 MG/ML
INJECTION, SOLUTION INTRAVENOUS CONTINUOUS PRN
Status: DISCONTINUED | OUTPATIENT
Start: 2025-03-06 | End: 2025-03-15 | Stop reason: HOSPADM

## 2025-03-06 RX ORDER — 0.9 % SODIUM CHLORIDE 0.9 %
1000 INTRAVENOUS SOLUTION INTRAVENOUS ONCE
Status: COMPLETED | OUTPATIENT
Start: 2025-03-06 | End: 2025-03-06

## 2025-03-06 RX ORDER — LEVOTHYROXINE SODIUM 50 UG/1
50 TABLET ORAL
Status: DISCONTINUED | OUTPATIENT
Start: 2025-03-07 | End: 2025-03-15 | Stop reason: HOSPADM

## 2025-03-06 RX ORDER — TORSEMIDE 20 MG/1
20 TABLET ORAL DAILY
Status: DISCONTINUED | OUTPATIENT
Start: 2025-03-07 | End: 2025-03-15 | Stop reason: HOSPADM

## 2025-03-06 RX ORDER — LANOLIN ALCOHOL/MO/W.PET/CERES
500 CREAM (GRAM) TOPICAL EVERY OTHER DAY
Status: DISCONTINUED | OUTPATIENT
Start: 2025-03-07 | End: 2025-03-15 | Stop reason: HOSPADM

## 2025-03-06 RX ORDER — MECOBALAMIN 5000 MCG
5 TABLET,DISINTEGRATING ORAL NIGHTLY PRN
Status: DISCONTINUED | OUTPATIENT
Start: 2025-03-06 | End: 2025-03-15 | Stop reason: HOSPADM

## 2025-03-06 RX ORDER — INSULIN LISPRO 100 [IU]/ML
0-8 INJECTION, SOLUTION INTRAVENOUS; SUBCUTANEOUS
Status: DISCONTINUED | OUTPATIENT
Start: 2025-03-06 | End: 2025-03-14

## 2025-03-06 RX ADMIN — SODIUM CHLORIDE 1000 ML: 0.9 INJECTION, SOLUTION INTRAVENOUS at 20:03

## 2025-03-06 RX ADMIN — PROTHROMBIN COMPLEX CONCENTRATE (HUMAN) 2000 UNITS: 25.5; 16.5; 24; 22; 22; 26 POWDER, FOR SOLUTION INTRAVENOUS at 20:12

## 2025-03-06 RX ADMIN — SODIUM CHLORIDE 50 ML: 0.9 INJECTION, SOLUTION INTRAVENOUS at 20:53

## 2025-03-06 RX ADMIN — PANTOPRAZOLE SODIUM 80 MG: 40 INJECTION, POWDER, FOR SOLUTION INTRAVENOUS at 20:01

## 2025-03-06 RX ADMIN — IOPAMIDOL 75 ML: 755 INJECTION, SOLUTION INTRAVENOUS at 20:44

## 2025-03-06 RX ADMIN — ONDANSETRON 4 MG: 2 INJECTION INTRAMUSCULAR; INTRAVENOUS at 20:00

## 2025-03-06 RX ADMIN — OCTREOTIDE ACETATE 50 MCG: 50 INJECTION, SOLUTION INTRAVENOUS; SUBCUTANEOUS at 20:13

## 2025-03-07 ENCOUNTER — APPOINTMENT (OUTPATIENT)
Dept: NUCLEAR MEDICINE | Age: 80
DRG: 378 | End: 2025-03-07
Payer: OTHER GOVERNMENT

## 2025-03-07 LAB
25(OH)D3 SERPL-MCNC: 36 NG/ML (ref 30–100)
ALBUMIN SERPL-MCNC: 3.5 G/DL (ref 3.5–5.2)
ALP SERPL-CCNC: 58 U/L (ref 40–129)
ALT SERPL-CCNC: 15 U/L (ref 0–40)
ANION GAP SERPL CALCULATED.3IONS-SCNC: 10 MMOL/L (ref 7–16)
AST SERPL-CCNC: 19 U/L (ref 0–39)
BASOPHILS # BLD: 0.04 K/UL (ref 0–0.2)
BASOPHILS NFR BLD: 0 % (ref 0–2)
BILIRUB SERPL-MCNC: 0.6 MG/DL (ref 0–1.2)
BUN SERPL-MCNC: 33 MG/DL (ref 6–23)
CALCIUM SERPL-MCNC: 8 MG/DL (ref 8.6–10.2)
CHLORIDE SERPL-SCNC: 107 MMOL/L (ref 98–107)
CHOLEST SERPL-MCNC: 95 MG/DL
CO2 SERPL-SCNC: 22 MMOL/L (ref 22–29)
CREAT SERPL-MCNC: 2.1 MG/DL (ref 0.7–1.2)
EKG ATRIAL RATE: 72 BPM
EKG Q-T INTERVAL: 426 MS
EKG QRS DURATION: 94 MS
EKG QTC CALCULATION (BAZETT): 462 MS
EKG R AXIS: -20 DEGREES
EKG T AXIS: 37 DEGREES
EKG VENTRICULAR RATE: 71 BPM
EOSINOPHIL # BLD: 0.12 K/UL (ref 0.05–0.5)
EOSINOPHILS RELATIVE PERCENT: 1 % (ref 0–6)
ERYTHROCYTE [DISTWIDTH] IN BLOOD BY AUTOMATED COUNT: 13.9 % (ref 11.5–15)
FERRITIN SERPL-MCNC: 69 NG/ML
FOLATE SERPL-MCNC: 16.7 NG/ML (ref 4.8–24.2)
FOLATE SERPL-MCNC: 17.5 NG/ML (ref 4.8–24.2)
GFR, ESTIMATED: 30 ML/MIN/1.73M2
GLUCOSE BLD-MCNC: 227 MG/DL (ref 74–99)
GLUCOSE BLD-MCNC: 246 MG/DL (ref 74–99)
GLUCOSE BLD-MCNC: 260 MG/DL (ref 74–99)
GLUCOSE BLD-MCNC: 262 MG/DL (ref 74–99)
GLUCOSE BLD-MCNC: 283 MG/DL (ref 74–99)
GLUCOSE SERPL-MCNC: 240 MG/DL (ref 74–99)
HBA1C MFR BLD: 6.3 % (ref 4–5.6)
HCT VFR BLD AUTO: 26.1 % (ref 37–54)
HCT VFR BLD AUTO: 29.8 % (ref 37–54)
HCT VFR BLD AUTO: 30.7 % (ref 37–54)
HDLC SERPL-MCNC: 24 MG/DL
HGB BLD-MCNC: 10.3 G/DL (ref 12.5–16.5)
HGB BLD-MCNC: 10.5 G/DL (ref 12.5–16.5)
HGB BLD-MCNC: 8.9 G/DL (ref 12.5–16.5)
IMM GRANULOCYTES # BLD AUTO: 0.06 K/UL (ref 0–0.58)
IMM GRANULOCYTES NFR BLD: 1 % (ref 0–5)
INR PPP: 1.4
IRON SATN MFR SERPL: 55 % (ref 20–55)
IRON SATN MFR SERPL: 83 % (ref 20–55)
IRON SERPL-MCNC: 151 UG/DL (ref 59–158)
IRON SERPL-MCNC: 234 UG/DL (ref 59–158)
LACTATE BLDV-SCNC: 1.4 MMOL/L (ref 0.5–2.2)
LDLC SERPL CALC-MCNC: 32 MG/DL
LYMPHOCYTES NFR BLD: 2.83 K/UL (ref 1.5–4)
LYMPHOCYTES RELATIVE PERCENT: 26 % (ref 20–42)
MAGNESIUM SERPL-MCNC: 2.1 MG/DL (ref 1.6–2.6)
MCH RBC QN AUTO: 34.4 PG (ref 26–35)
MCHC RBC AUTO-ENTMCNC: 34.2 G/DL (ref 32–34.5)
MCV RBC AUTO: 100.7 FL (ref 80–99.9)
MONOCYTES NFR BLD: 0.64 K/UL (ref 0.1–0.95)
MONOCYTES NFR BLD: 6 % (ref 2–12)
NEUTROPHILS NFR BLD: 66 % (ref 43–80)
NEUTS SEG NFR BLD: 7.17 K/UL (ref 1.8–7.3)
PARTIAL THROMBOPLASTIN TIME: 33.5 SEC (ref 24.5–35.1)
PHOSPHATE SERPL-MCNC: 3 MG/DL (ref 2.5–4.5)
PLATELET # BLD AUTO: 112 K/UL (ref 130–450)
PMV BLD AUTO: 9.4 FL (ref 7–12)
POTASSIUM SERPL-SCNC: 4.5 MMOL/L (ref 3.5–5)
PROT SERPL-MCNC: 5.2 G/DL (ref 6.4–8.3)
PROTHROMBIN TIME: 14.7 SEC (ref 9.3–12.4)
RBC # BLD AUTO: 3.05 M/UL (ref 3.8–5.8)
SODIUM SERPL-SCNC: 139 MMOL/L (ref 132–146)
T4 FREE SERPL-MCNC: 1.4 NG/DL (ref 0.9–1.7)
TIBC SERPL-MCNC: 276 UG/DL (ref 250–450)
TIBC SERPL-MCNC: 282 UG/DL (ref 250–450)
TRIGL SERPL-MCNC: 194 MG/DL
TROPONIN I SERPL HS-MCNC: 27 NG/L (ref 0–11)
TSH SERPL DL<=0.05 MIU/L-ACNC: 2.29 UIU/ML (ref 0.27–4.2)
VIT B12 SERPL-MCNC: 824 PG/ML (ref 211–946)
VIT B12 SERPL-MCNC: 865 PG/ML (ref 211–946)
VLDLC SERPL CALC-MCNC: 39 MG/DL
WBC OTHER # BLD: 10.9 K/UL (ref 4.5–11.5)

## 2025-03-07 PROCEDURE — 80061 LIPID PANEL: CPT

## 2025-03-07 PROCEDURE — 2580000003 HC RX 258: Performed by: HOSPITALIST

## 2025-03-07 PROCEDURE — 6370000000 HC RX 637 (ALT 250 FOR IP)

## 2025-03-07 PROCEDURE — 82306 VITAMIN D 25 HYDROXY: CPT

## 2025-03-07 PROCEDURE — 2580000003 HC RX 258: Performed by: INTERNAL MEDICINE

## 2025-03-07 PROCEDURE — 82272 OCCULT BLD FECES 1-3 TESTS: CPT

## 2025-03-07 PROCEDURE — 83540 ASSAY OF IRON: CPT

## 2025-03-07 PROCEDURE — 83735 ASSAY OF MAGNESIUM: CPT

## 2025-03-07 PROCEDURE — 85014 HEMATOCRIT: CPT

## 2025-03-07 PROCEDURE — 6360000002 HC RX W HCPCS: Performed by: HOSPITALIST

## 2025-03-07 PROCEDURE — 85610 PROTHROMBIN TIME: CPT

## 2025-03-07 PROCEDURE — 82607 VITAMIN B-12: CPT

## 2025-03-07 PROCEDURE — 82746 ASSAY OF FOLIC ACID SERUM: CPT

## 2025-03-07 PROCEDURE — 36415 COLL VENOUS BLD VENIPUNCTURE: CPT

## 2025-03-07 PROCEDURE — 83605 ASSAY OF LACTIC ACID: CPT

## 2025-03-07 PROCEDURE — 85730 THROMBOPLASTIN TIME PARTIAL: CPT

## 2025-03-07 PROCEDURE — 6360000002 HC RX W HCPCS

## 2025-03-07 PROCEDURE — 80053 COMPREHEN METABOLIC PANEL: CPT

## 2025-03-07 PROCEDURE — 84443 ASSAY THYROID STIM HORMONE: CPT

## 2025-03-07 PROCEDURE — 78278 ACUTE GI BLOOD LOSS IMAGING: CPT

## 2025-03-07 PROCEDURE — 82728 ASSAY OF FERRITIN: CPT

## 2025-03-07 PROCEDURE — 93010 ELECTROCARDIOGRAM REPORT: CPT | Performed by: INTERNAL MEDICINE

## 2025-03-07 PROCEDURE — A9560 TC99M LABELED RBC: HCPCS | Performed by: RADIOLOGY

## 2025-03-07 PROCEDURE — 84100 ASSAY OF PHOSPHORUS: CPT

## 2025-03-07 PROCEDURE — 85025 COMPLETE CBC W/AUTO DIFF WBC: CPT

## 2025-03-07 PROCEDURE — 85018 HEMOGLOBIN: CPT

## 2025-03-07 PROCEDURE — 84484 ASSAY OF TROPONIN QUANT: CPT

## 2025-03-07 PROCEDURE — 83036 HEMOGLOBIN GLYCOSYLATED A1C: CPT

## 2025-03-07 PROCEDURE — 83550 IRON BINDING TEST: CPT

## 2025-03-07 PROCEDURE — 3430000000 HC RX DIAGNOSTIC RADIOPHARMACEUTICAL: Performed by: RADIOLOGY

## 2025-03-07 PROCEDURE — 84439 ASSAY OF FREE THYROXINE: CPT

## 2025-03-07 PROCEDURE — 2060000000 HC ICU INTERMEDIATE R&B

## 2025-03-07 PROCEDURE — 82962 GLUCOSE BLOOD TEST: CPT

## 2025-03-07 RX ORDER — SODIUM CHLORIDE 9 MG/ML
INJECTION, SOLUTION INTRAVENOUS CONTINUOUS
Status: DISCONTINUED | OUTPATIENT
Start: 2025-03-07 | End: 2025-03-15

## 2025-03-07 RX ORDER — SODIUM CHLORIDE 9 MG/ML
INJECTION, SOLUTION INTRAVENOUS PRN
Status: DISCONTINUED | OUTPATIENT
Start: 2025-03-07 | End: 2025-03-15 | Stop reason: HOSPADM

## 2025-03-07 RX ADMIN — INSULIN LISPRO 2 UNITS: 100 INJECTION, SOLUTION INTRAVENOUS; SUBCUTANEOUS at 17:40

## 2025-03-07 RX ADMIN — INSULIN GLARGINE 28 UNITS: 100 INJECTION, SOLUTION SUBCUTANEOUS at 12:50

## 2025-03-07 RX ADMIN — INSULIN LISPRO 4 UNITS: 100 INJECTION, SOLUTION INTRAVENOUS; SUBCUTANEOUS at 21:25

## 2025-03-07 RX ADMIN — INSULIN LISPRO 2 UNITS: 100 INJECTION, SOLUTION INTRAVENOUS; SUBCUTANEOUS at 00:19

## 2025-03-07 RX ADMIN — FERROUS SULFATE TAB 325 MG (65 MG ELEMENTAL FE) 325 MG: 325 (65 FE) TAB at 12:49

## 2025-03-07 RX ADMIN — INSULIN LISPRO 4 UNITS: 100 INJECTION, SOLUTION INTRAVENOUS; SUBCUTANEOUS at 12:50

## 2025-03-07 RX ADMIN — DULOXETINE HYDROCHLORIDE 30 MG: 30 CAPSULE, DELAYED RELEASE ORAL at 00:12

## 2025-03-07 RX ADMIN — INSULIN GLARGINE 28 UNITS: 100 INJECTION, SOLUTION SUBCUTANEOUS at 21:25

## 2025-03-07 RX ADMIN — CYANOCOBALAMIN TAB 1000 MCG 500 MCG: 1000 TAB at 12:50

## 2025-03-07 RX ADMIN — SODIUM CHLORIDE, PRESERVATIVE FREE 40 MG: 5 INJECTION INTRAVENOUS at 17:10

## 2025-03-07 RX ADMIN — SODIUM CHLORIDE: 9 INJECTION, SOLUTION INTRAVENOUS at 14:48

## 2025-03-07 RX ADMIN — INSULIN GLARGINE 28 UNITS: 100 INJECTION, SOLUTION SUBCUTANEOUS at 00:14

## 2025-03-07 RX ADMIN — Medication 20 MILLICURIE: at 12:36

## 2025-03-07 RX ADMIN — LEVOTHYROXINE SODIUM 50 MCG: 0.05 TABLET ORAL at 12:49

## 2025-03-07 RX ADMIN — DULOXETINE HYDROCHLORIDE 30 MG: 30 CAPSULE, DELAYED RELEASE ORAL at 21:25

## 2025-03-07 RX ADMIN — TORSEMIDE 20 MG: 20 TABLET ORAL at 12:49

## 2025-03-07 RX ADMIN — PROCHLORPERAZINE EDISYLATE 10 MG: 5 INJECTION INTRAMUSCULAR; INTRAVENOUS at 22:45

## 2025-03-07 RX ADMIN — SODIUM CHLORIDE, PRESERVATIVE FREE 40 MG: 5 INJECTION INTRAVENOUS at 21:25

## 2025-03-07 NOTE — ED NOTES
Radiology Procedure Waiver   Name: Brooke Vaughan  : 1945  MRN: [id number]    Date:  3/6/25    Time: 8:11 PM EST    Benefits of immediately proceeding with Radiology exam(s) without pre-testing outweigh the risks or are not indicated as specified below and therefore the following is/are being waived:    [] Pregnancy test   [] Patients LMP on-time and regular.   [] Patient had Tubal Ligation or has other Contraception Device.   [] Patient  is Menopausal or Premenarcheal.    [] Patient had Full or Partial Hysterectomy.    [x] Protocol for Iodine allergy    [] MRI Questionnaire     [x] BUN/Creatinine   [] Patient age w/no hx of renal dysfunction.   [] Patient on Dialysis.   [] Recent Normal Labs.  Electronically signed by Linda Garcia MD on 3/6/25 at 8:11 PM EST

## 2025-03-07 NOTE — ED PROVIDER NOTES
Radiology Procedure Waiver   Name: Brooke Vaughan  : 1945  MRN: [id number]    Date:  3/6/25    Time: 7:51 PM EST    Benefits of immediately proceeding with Radiology exam(s) without pre-testing outweigh the risks or are not indicated as specified below and therefore the following is/are being waived:    [] Pregnancy test   [] Patients LMP on-time and regular.   [] Patient had Tubal Ligation or has other Contraception Device.   [] Patient  is Menopausal or Premenarcheal.    [] Patient had Full or Partial Hysterectomy.    [] Protocol for Iodine allergy    [] MRI Questionnaire     [x] BUN/Creatinine   [] Patient age w/no hx of renal dysfunction.   [] Patient on Dialysis.   [] Recent Normal Labs.  Electronically signed by Linda Garcia MD on 3/6/25 at 7:51 PM EST

## 2025-03-07 NOTE — ED PROVIDER NOTES
[facility] EMERGENCY DEPARTMENT  EMERGENCY DEPARTMENT ENCOUNTER        Pt Name: Brooke Vaughan  MRN: [id number]  Birthdate 1945  Date of evaluation: 3/6/2025  Provider: Linda Garcia MD  PCP: Betty Rodríguez (Inactive)  Note Started: 8:11 PM EST 3/6/25    CHIEF COMPLAINT       Chief Complaint   Patient presents with    Hematemesis     EMS states that they were called for fall. Pt started to vomit blood upon arrival        HISTORY OF PRESENT ILLNESS: 1 or more Elements   History From: Patient and EMS    Limitations to history : None    Brooke Vaughan is a 79 y.o. male who presents from home for fall.  The patient reported to EMS that he felt lightheaded and lowered himself to the ground.  There is no report of loss of consciousness or head trauma however the patient does not remember all that happened.  He is on Xarelto for atrial fibrillation.  Upon arrival to the ED while sitting on the stretcher waiting for room the patient had large amount of bloody vomit.  This was the first occurrence. According to family he is no issues with GI bleed or GI issues in the past such as liver issues.  It was also noted that he had bloody diarrhea.  The patient is not complaining of any abdominal pain.  He does endorse some mild chest pain and shortness of breath after the episode.  Patient's vitals were initially normal for EMS however upon arrival after the vomiting episode his blood pressure was found to be quite low.  The patient is still alert and oriented x 3 and in no acute distress.  Patient denies alcohol use    Nursing Notes were all reviewed and agreed with or any disagreements were addressed in the HPI.        REVIEW OF EXTERNAL NOTE :       [facility]    Echo on 7/25/2023   Summary   Technically difficult examination.   Micro-bubble contrast injected to enhance left ventricular visualization.   Estimated left ventricle ejection fraction 55 to 60 %.   Mild to  moderate left ventricular concentric hypertrophy noted.   There is doppler evidence of stage I diastolic dysfunction.   Normal right ventricular size and function.   Mild tricuspid regurgitation.    Stress test on 7/24/2023  IMPRESSION:  1.  ECG during the infusion did not change.   2.  The myocardial perfusion imaging was abnormal.  3.  The abnormality was a small sized mild fixed defect involving the  mid to basal inferolateral wall and also small sized, mild defect  involving the apex and distal septal walls suggestive of prior  infarct. However, underlying attenuation artifact cannot be ruled out.  4.  Overall left ventricular systolic function was normal with  regional wall motion abnormalities.  5.  No transient ischemic dilatation.  6.  Intermediate risk general pharmacologic stress test.    REVIEW OF SYSTEMS :           Positives and Pertinent negatives as per HPI.     SURGICAL HISTORY     Past Surgical History:   Procedure Laterality Date    CARDIAC CATHETERIZATION  04/26/2012    CARDIAC CATHETERIZATION  10/12/2018    Dr. Soler    CARDIOVASCULAR STRESS TEST  08/11    CHOLECYSTECTOMY      DIAGNOSTIC CARDIAC CATH LAB PROCEDURE  11/8/2007    Dr. Gilbert No CAD EF 59%    DIAGNOSTIC CARDIAC CATH LAB PROCEDURE  4/26/2010    Dr. Castillo: EF 66% Moderate ectasia involving RCA and cx       CURRENTMEDICATIONS       Previous Medications    ACETAMINOPHEN (TYLENOL) 325 MG TABLET    Take 1 tablet by mouth every 6 hours as needed for Pain    ALBUTEROL (PROVENTIL) (2.5 MG/3ML) 0.083% NEBULIZER SOLUTION    Take 3 mLs by nebulization every 6 hours as needed for Wheezing or Shortness of Breath    ALBUTEROL SULFATE  (90 BASE) MCG/ACT INHALER    Inhale 1-2 puffs into the lungs 4 times daily as needed for Wheezing    AMIODARONE (CORDARONE) 200 MG TABLET    Take 1 tablet by mouth daily    AMMONIUM LACTATE (AMLACTIN) 12 % CREAM    Apply 1 g topically 2 times daily Apply topically as needed.    ASPIRIN 81 MG EC TABLET     (PROTONIX) 40 MG TABLET    Take 1 tablet by mouth daily    POLYETHYLENE GLYCOL (GLYCOLAX) POWDER    Take 17 g by mouth daily    PRAVASTATIN (PRAVACHOL) 40 MG TABLET    Take 0.5 tablets by mouth nightly    RIVAROXABAN (XARELTO) 15 MG TABS TABLET    Take 1 tablet by mouth Daily with supper    SIMETHICONE (MYLICON) 80 MG CHEWABLE TABLET    Take 1 tablet by mouth 4 times daily as needed for Flatulence    TAMSULOSIN (FLOMAX) 0.4 MG CAPSULE    Take 1 capsule by mouth nightly    TORSEMIDE (DEMADEX) 20 MG TABLET    Take 1 tablet by mouth daily    VITAMIN B-12 (CYANOCOBALAMIN) 1000 MCG TABLET    Take 1 tablet by mouth daily       ALLERGIES     Dye [iodides]    FAMILYHISTORY       Family History   Problem Relation Age of Onset    Cancer Mother     Mental Illness Sister     Heart Disease Brother         SOCIAL HISTORY       Social History     Tobacco Use    Smoking status: Former     Current packs/day: 0.00     Average packs/day: 0.5 packs/day for 25.0 years (12.5 ttl pk-yrs)     Types: Cigarettes     Start date: 1970     Quit date: 1995     Years since quittin.2    Smokeless tobacco: Never   Substance Use Topics    Alcohol use: No     Comment: heavy drinker in the past--none since ;  drinks 3-4 cups of coffee dialy    Drug use: No       SCREENINGS        [place] Coma Scale  Eye Opening: Spontaneous  Best Verbal Response: Oriented  Best Motor Response: Obeys commands  [place] Coma Scale Score: 15                WA Assessment  BP: (!) 112/53  Pulse: 87           PHYSICAL EXAM  1 or more Elements     ED Triage Vitals [25 1944]   BP Systolic BP Percentile Diastolic BP Percentile Temp Temp src Pulse Resp SpO2   -- -- -- -- -- -- -- --      Height Weight - Scale         -- 117.9 kg (260 lb)             Constitutional/General: Alert and oriented x3.  Active hematemesis of gross blood on exam.  No acute distress.  Appears pale  Head: Normocephalic and atraumatic  Eyes: PERRL, EOMI, conjunctiva normal, sclera

## 2025-03-07 NOTE — PROGRESS NOTES
Department of Internal Medicine  PN    [facility]   Admitting Physician: Dr. Connor  Consultants: Dr. Harrison      CHIEF COMPLAINT:  fall    HISTORY OF PRESENT ILLNESS:    Patient presents to ER due to falling at home twice today; states he felt lightheaded/dizzy and felt ringing in his ears, he is unsure if he lost consciousness the first fall. The second fall his daughter helped him lower himself to the ground. When he initially arrived to ER he threw up blood and had bloody diarrhea (first occurrence-no history of GI bleed, has history of anemia, on xarelto for AFIB, denies ASA/ETOH/NSAID use, takes tylenol for pain) he also has some shortness of breath with exertion. He denies new allergies. Unsure of medications-[facility]. His daughter Gerda and her son are present at cart-side, all questions answered at this time. Patient does not use nicotine, alcohol, marijuana, or illicit drugs. Lives at home with Gerda, independent with ADLs uses cane and sometimes rollator, no home health services. He is agreeable to admission for evaluation of hematemesis.    3/7/2025  Patient seen examined on monitored bed in ED hold.  Patient currently down nuclear medicine for GI bleeding scan.  Patient still has some abdominal discomfort and the lightheadedness is improved.  BUN/creatinine 33/2.1 with normal electrolytes.  Blood sugars range 240-246.  Liver enzymes are normal with a WBC of 10.9 hemoglobin 10.5.  INR is 1.4.  Heart rate is 89 with blood pressure 147/79 with O2 sat 96% on room air at rest.  Patient's daughter is at the bedside.  Patient overall feels better but still very weak.  Unfortunately patient had a CTA of the abdomen with IV contrast and a CTA of the chest.  These did not show any evidence of GI bleed or PE or acute pulmonary problem.  There is no evidence any intraluminal hematoma or aneurysm.  Patient serum creatinine was 2.3 on admission.    PAST MEDICAL Hx:  Past Medical History:  WO CONTRAST    Result Date: 3/6/2025  EXAMINATION: CT OF THE CERVICAL SPINE WITHOUT CONTRAST 3/6/2025 8:39 pm TECHNIQUE: CT of the cervical spine was performed without the administration of intravenous contrast. Multiplanar reformatted images are provided for review. Automated exposure control, iterative reconstruction, and/or weight based adjustment of the mA/kV was utilized to reduce the radiation dose to as low as reasonably achievable. COMPARISON: None. HISTORY: ORDERING SYSTEM PROVIDED HISTORY: fall TECHNOLOGIST PROVIDED HISTORY: Reason for exam:->fall FINDINGS: BONES/ALIGNMENT: There is no acute fracture or traumatic malalignment. DEGENERATIVE CHANGES: Significant multilevel degenerative changes. SOFT TISSUES: There is no prevertebral soft tissue swelling.     No acute abnormality of the cervical spine.     CT HEAD WO CONTRAST    Result Date: 3/6/2025  EXAMINATION: CT OF THE HEAD WITHOUT CONTRAST  3/6/2025 8:39 pm TECHNIQUE: CT of the head was performed without the administration of intravenous contrast. Automated exposure control, iterative reconstruction, and/or weight based adjustment of the mA/kV was utilized to reduce the radiation dose to as low as reasonably achievable. COMPARISON: None. HISTORY: ORDERING SYSTEM PROVIDED HISTORY: fall TECHNOLOGIST PROVIDED HISTORY: Has a \"code stroke\" or \"stroke alert\" been called?->No Reason for exam:->fall FINDINGS: BRAIN/VENTRICLES: There is no acute intracranial hemorrhage, mass effect or midline shift.  No abnormal extra-axial fluid collection.  The gray-white differentiation is maintained without evidence of an acute infarct.  There is no evidence of hydrocephalus. ORBITS: The visualized portion of the orbits demonstrate no acute abnormality. SINUSES: The frontal, ethmoid and sphenoid sinus mucosal thickening. SOFT TISSUES/SKULL:  No acute abnormality of the visualized skull or soft tissues.     1. No acute intracranial abnormality. 2. Frontal, ethmoid and

## 2025-03-07 NOTE — H&P
Department of Internal Medicine  History and Physical    [facility]   Admitting Physician: Dr. Connor  Consultants: Dr. Harrison      CHIEF COMPLAINT:  fall    HISTORY OF PRESENT ILLNESS:    Patient presents to ER due to falling at home twice today; states he felt lightheaded/dizzy and felt ringing in his ears, he is unsure if he lost consciousness the first fall. The second fall his daughter helped him lower himself to the ground. When he initially arrived to ER he threw up blood and had bloody diarrhea (first occurrence-no history of GI bleed, has history of anemia, on xarelto for AFIB, denies ASA/ETOH/NSAID use, takes tylenol for pain) he also has some shortness of breath with exertion. He denies new allergies. Unsure of medications-[facility]. His daughter Gerda and her son are present at cart-side, all questions answered at this time. Patient does not use nicotine, alcohol, marijuana, or illicit drugs. Lives at home with Gerda, independent with ADLs uses cane and sometimes rollator, no home health services. He is agreeable to admission for evaluation of hematemesis.    PAST MEDICAL Hx:  Past Medical History:   Diagnosis Date    Arthritis     Atrial fibrillation (HCC)     Diabetes mellitus type 2, noninsulin dependent (HCC)     Dyspnea     H/O cardiovascular stress test 07/24/2023    Lexiscan    History of cardiovascular stress test 10/05/2018    Lexiscan stress test    History of echocardiogram 12/28/2017    EF  55%    Hyperlipidemia     Hypertension     Preoperative clearance     on chart for surgery with KRISHAN Han 12/2015       PAST SURGICAL Hx:   Past Surgical History:   Procedure Laterality Date    CARDIAC CATHETERIZATION  04/26/2012    CARDIAC CATHETERIZATION  10/12/2018    Dr. Soler    CARDIOVASCULAR STRESS TEST  08/11    CHOLECYSTECTOMY      DIAGNOSTIC CARDIAC CATH LAB PROCEDURE  11/8/2007    Dr. Gilbert No CAD EF 59%    DIAGNOSTIC CARDIAC CATH LAB PROCEDURE  4/26/2010    Dr. Castillo:  olodatrol/tiotropium   HFpEF on torsemide  History of pernicious anemia on cyanocobalamin   History of iron deficiency anemia on ferrous sulfate  Depression on duloxetine   Type II insulin dependent diabetes mellitus on glargine  Hypothyroidism on levothyroxine  Class II obesity BMI 35.26 kg/m2    PLAN:  Admit to monitored floor. Home medications will be reconciled. Lab work ordered for morning. Antiemetics, antipyretics, as needed pain medicine, electrolyte supplementation, hypoglycemia recovery medications have been ordered. Consults will be made to gastroenterology, PT/OT. SCDs for VTE prophylaxis, pantoprazole for GI prophylaxis, xarelto on hold. He was given protonix, octreotide, balfexar, TXA, 1L V; 2RBCs ordered but held due to result of repeat H&H.     The patient was seen, examined, then discussed with Dr. Connor.    RICK Haddad CNP  9:41 PM  3/6/2025    Electronically signed by RICK Haddad CNP on 3/6/2025 at 10:42 PM

## 2025-03-07 NOTE — CONSULTS
CONSULT  Pietro Ann M.D.  [facility]  Dr. Kristen Lowery M.D.,  Dr. Jose Dyson M.D.,  Dr. Nelson Armenta D.O.,  Dr. José Manuel Beaulieu D.O. ,  Dr. Jean-Pierre Harrison M.D.,      Brooke Vaughan  79 y.o.  male      Re: \"acute gi bleeding\"  Requesting physician: Dr. Garcia, resident/emergency department  Date:8:47 AM 3/7/2025        HPI: 79-year-old male patient seen in the hospital for above described issue.  He has past medical history of hypertension, hyperlipidemia, diabetes mellitus type 2, obesity and atrial fibrillation for which he is on oral anticoagulation with Xarelto.  Apparently yesterday he had some episodes of lightheadedness and falls but unsure whether he lost consciousness.  Upon the return of his daughter from work patient had episode of nausea and hematemesis of bright blood.  Apparently he also had some rectal bleeding.  According to the daughter patient lost Xarelto was yesterday.  He otherwise denies abdominal pain.  He denies currently nausea vomiting he has not had any further episodes of hematemesis while in the emergency department.  According to the patient and her daughter most recent colonoscopy has been years ago however neither him nor however cannot provide exact timeline or provide details in regards to the findings on the endoscopy.  Review of electronic medical records fails to yield information regards to previous hospital encounters of our group with this patient or inpatient endoscopies.  Patient himself complains of chest pain which he localizes in the left chest.  He also complains of shortness of breath.  Laboratory work on presentation has revealed hemoglobin of 12.4 decreasing to 10.5 this morning.  Most recent previous hemoglobin of 16 in October 2023 but previously anemic with hemoglobin of 10.8.  Iron studies has not been obtained.  Additional laboratory work reveals white blood cell count of 10.9 and a platelet count of 112 which appears to be chronically  mouth daily 30 tablet 0    metoprolol succinate (TOPROL XL) 25 MG extended release tablet Take 1 tablet by mouth nightly 30 tablet 0    torsemide (DEMADEX) 20 MG tablet Take 1 tablet by mouth daily      insulin aspart (NOVOLOG FLEXPEN) 100 UNIT/ML injection pen Inject into the skin 3 times daily (before meals) *Per Sliding Scale*  Give 1 extra unit for every 50 points >150      pantoprazole (PROTONIX) 40 MG tablet Take 1 tablet by mouth daily      rivaroxaban (XARELTO) 15 MG TABS tablet Take 1 tablet by mouth Daily with supper      CPAP Machine MISC by Does not apply route nightly      acetaminophen (TYLENOL) 325 MG tablet Take 1 tablet by mouth every 6 hours as needed for Pain      nitroGLYCERIN (NITROSTAT) 0.4 MG SL tablet Place 1 tablet under the tongue every 5 minutes as needed for Chest pain up to max of 3 total doses. If no relief after 1 dose, call 911.      fluocinonide (LIDEX) 0.05 % cream Apply 1 each topically 2 times daily Apply topically to rash twice daily.      ferrous sulfate (IRON 325) 325 (65 Fe) MG tablet Take 1 tablet by mouth daily (with breakfast)      DULoxetine (CYMBALTA) 20 MG extended release capsule Take 1 capsule by mouth nightly      ammonium lactate (AMLACTIN) 12 % cream Apply 1 g topically 2 times daily Apply topically as needed.      simethicone (MYLICON) 80 MG chewable tablet Take 1 tablet by mouth 4 times daily as needed for Flatulence      melatonin 3 MG TABS tablet Take 1 tablet by mouth nightly      levothyroxine (SYNTHROID) 50 MCG tablet Take 1 tablet by mouth every morning (before breakfast)      vitamin B-12 (CYANOCOBALAMIN) 1000 MCG tablet Take 1 tablet by mouth daily      Capsaicin 0.1 % CREA Apply 1 each topically 3 times daily      benzonatate (TESSALON) 100 MG capsule Take 1 capsule by mouth 3 times daily as needed for Cough      MENTHOL-METHYL SALICYLATE EX Apply 1 each topically 4 times daily      carboxymethylcellulose (REFRESH PLUS) 0.5 % SOLN ophthalmic solution 1  of Health     Financial Resource Strain: Not on file   Food Insecurity: Not on file   Transportation Needs: Not on file   Physical Activity: Not on file   Stress: Not on file   Social Connections: Not on file   Intimate Partner Violence: Not on file   Housing Stability: Not on file       FamHx:  Family History   Problem Relation Age of Onset    Cancer Mother     Mental Illness Sister     Heart Disease Brother        Allergy:  Allergies   Allergen Reactions    Dye [Iodides] Other (See Comments)     PATIENT STATES HE BLACKED OUT WHEN GIVEN IVP DYE         ROS: As described in the HPI and in addition is negative upon detailed review of systems or unobtainable unless otherwise stated in this dictation.    PE:  BP (!) 112/53   Pulse 87   Temp 97.7 °F (36.5 °C) (Oral)   Resp 17   Wt 117.9 kg (260 lb)   SpO2 95%   BMI 35.26 kg/m²     Gen.: NAD/older adult obese  male.  AAOx3  Head: Atraumatic/normocephalic  Eyes: EOMI/anicteric sclera/no conjunctival erythema  ENT: Moist oral mucosa/no discharge from nose or ears  Neck: Supple/trachea is midline  Chest: CTAB/symmetric excursions  Cor: Appears to be regular without gallop/S1-S2  Abd.: Soft.  Obese.  Appears nontender.  BS +/4  Extr.:  Bilateral lower extremity edema.  Muscles: Decreased tone and bulk, consistent with age and condition  Skin: Warm and dry/anicteric      DATA:     Lab Results   Component Value Date/Time    WBC 10.9 03/07/2025 07:15 AM    RBC 3.05 03/07/2025 07:15 AM    HGB 10.5 03/07/2025 07:15 AM    HCT 30.7 03/07/2025 07:15 AM    .7 03/07/2025 07:15 AM    MCH 34.4 03/07/2025 07:15 AM    MCHC 34.2 03/07/2025 07:15 AM    RDW 13.9 03/07/2025 07:15 AM     03/07/2025 07:15 AM    MPV 9.4 03/07/2025 07:15 AM     Lab Results   Component Value Date/Time     03/07/2025 07:15 AM    K 4.5 03/07/2025 07:15 AM    K 4.5 05/07/2019 11:05 AM     03/07/2025 07:15 AM    CO2 22 03/07/2025 07:15 AM    BUN 33 03/07/2025 07:15 AM

## 2025-03-08 ENCOUNTER — ANESTHESIA EVENT (OUTPATIENT)
Dept: ENDOSCOPY | Age: 80
End: 2025-03-08
Payer: OTHER GOVERNMENT

## 2025-03-08 ENCOUNTER — ANESTHESIA (OUTPATIENT)
Dept: ENDOSCOPY | Age: 80
End: 2025-03-08
Payer: OTHER GOVERNMENT

## 2025-03-08 ENCOUNTER — APPOINTMENT (OUTPATIENT)
Dept: GENERAL RADIOLOGY | Age: 80
DRG: 378 | End: 2025-03-08
Payer: OTHER GOVERNMENT

## 2025-03-08 PROBLEM — T17.908A ASPIRATION INTO AIRWAY: Status: ACTIVE | Noted: 2025-03-08

## 2025-03-08 LAB
ALBUMIN SERPL-MCNC: 3.4 G/DL (ref 3.5–5.2)
ALBUMIN SERPL-MCNC: 3.5 G/DL (ref 3.5–5.2)
ALP SERPL-CCNC: 52 U/L (ref 40–129)
ALP SERPL-CCNC: 53 U/L (ref 40–129)
ALT SERPL-CCNC: 12 U/L (ref 0–40)
ALT SERPL-CCNC: 12 U/L (ref 0–40)
ANION GAP SERPL CALCULATED.3IONS-SCNC: 11 MMOL/L (ref 7–16)
ANION GAP SERPL CALCULATED.3IONS-SCNC: 13 MMOL/L (ref 7–16)
AST SERPL-CCNC: 18 U/L (ref 0–39)
AST SERPL-CCNC: 19 U/L (ref 0–39)
BASOPHILS # BLD: 0.04 K/UL (ref 0–0.2)
BASOPHILS NFR BLD: 0 % (ref 0–2)
BILIRUB SERPL-MCNC: 0.5 MG/DL (ref 0–1.2)
BILIRUB SERPL-MCNC: 0.5 MG/DL (ref 0–1.2)
BUN SERPL-MCNC: 35 MG/DL (ref 6–23)
BUN SERPL-MCNC: 36 MG/DL (ref 6–23)
CALCIUM SERPL-MCNC: 7.9 MG/DL (ref 8.6–10.2)
CALCIUM SERPL-MCNC: 8.1 MG/DL (ref 8.6–10.2)
CHLORIDE SERPL-SCNC: 101 MMOL/L (ref 98–107)
CHLORIDE SERPL-SCNC: 105 MMOL/L (ref 98–107)
CO2 SERPL-SCNC: 21 MMOL/L (ref 22–29)
CO2 SERPL-SCNC: 22 MMOL/L (ref 22–29)
CREAT SERPL-MCNC: 2.1 MG/DL (ref 0.7–1.2)
CREAT SERPL-MCNC: 2.2 MG/DL (ref 0.7–1.2)
D-DIMER QUANTITATIVE: <200 NG/ML DDU (ref 0–230)
DATE, STOOL #1: ABNORMAL
EOSINOPHIL # BLD: 0.11 K/UL (ref 0.05–0.5)
EOSINOPHILS RELATIVE PERCENT: 1 % (ref 0–6)
ERYTHROCYTE [DISTWIDTH] IN BLOOD BY AUTOMATED COUNT: 14 % (ref 11.5–15)
ERYTHROCYTE [DISTWIDTH] IN BLOOD BY AUTOMATED COUNT: 14.8 % (ref 11.5–15)
GFR, ESTIMATED: 29 ML/MIN/1.73M2
GFR, ESTIMATED: 31 ML/MIN/1.73M2
GLUCOSE BLD-MCNC: 224 MG/DL (ref 74–99)
GLUCOSE BLD-MCNC: 327 MG/DL (ref 74–99)
GLUCOSE BLD-MCNC: 358 MG/DL (ref 74–99)
GLUCOSE BLD-MCNC: 405 MG/DL (ref 74–99)
GLUCOSE SERPL-MCNC: 265 MG/DL (ref 74–99)
GLUCOSE SERPL-MCNC: 365 MG/DL (ref 74–99)
HCT VFR BLD AUTO: 22.5 % (ref 37–54)
HCT VFR BLD AUTO: 23.4 % (ref 37–54)
HCT VFR BLD AUTO: 25 % (ref 37–54)
HEMOCCULT SP1 STL QL: POSITIVE
HGB BLD-MCNC: 7.7 G/DL (ref 12.5–16.5)
HGB BLD-MCNC: 7.9 G/DL (ref 12.5–16.5)
HGB BLD-MCNC: 8.6 G/DL (ref 12.5–16.5)
IMM GRANULOCYTES # BLD AUTO: 0.1 K/UL (ref 0–0.58)
IMM GRANULOCYTES NFR BLD: 1 % (ref 0–5)
LYMPHOCYTES NFR BLD: 3.88 K/UL (ref 1.5–4)
LYMPHOCYTES RELATIVE PERCENT: 28 % (ref 20–42)
MAGNESIUM SERPL-MCNC: 2 MG/DL (ref 1.6–2.6)
MCH RBC QN AUTO: 34.5 PG (ref 26–35)
MCH RBC QN AUTO: 35.4 PG (ref 26–35)
MCHC RBC AUTO-ENTMCNC: 33.8 G/DL (ref 32–34.5)
MCHC RBC AUTO-ENTMCNC: 34.4 G/DL (ref 32–34.5)
MCV RBC AUTO: 102.2 FL (ref 80–99.9)
MCV RBC AUTO: 102.9 FL (ref 80–99.9)
MONOCYTES NFR BLD: 0.86 K/UL (ref 0.1–0.95)
MONOCYTES NFR BLD: 6 % (ref 2–12)
NEUTROPHILS NFR BLD: 65 % (ref 43–80)
NEUTS SEG NFR BLD: 9.13 K/UL (ref 1.8–7.3)
PHOSPHATE SERPL-MCNC: 2.5 MG/DL (ref 2.5–4.5)
PLATELET # BLD AUTO: 128 K/UL (ref 130–450)
PLATELET # BLD AUTO: 136 K/UL (ref 130–450)
PMV BLD AUTO: 9.5 FL (ref 7–12)
PMV BLD AUTO: 9.6 FL (ref 7–12)
POTASSIUM SERPL-SCNC: 4.4 MMOL/L (ref 3.5–5)
POTASSIUM SERPL-SCNC: 4.5 MMOL/L (ref 3.5–5)
PROT SERPL-MCNC: 5 G/DL (ref 6.4–8.3)
PROT SERPL-MCNC: 5.2 G/DL (ref 6.4–8.3)
RBC # BLD AUTO: 2.29 M/UL (ref 3.8–5.8)
RBC # BLD AUTO: 2.43 M/UL (ref 3.8–5.8)
SODIUM SERPL-SCNC: 136 MMOL/L (ref 132–146)
SODIUM SERPL-SCNC: 137 MMOL/L (ref 132–146)
TIME, STOOL #1: 2205
TROPONIN I SERPL HS-MCNC: 29 NG/L (ref 0–11)
TROPONIN I SERPL HS-MCNC: 30 NG/L (ref 0–11)
TROPONIN I SERPL HS-MCNC: 33 NG/L (ref 0–11)
WBC OTHER # BLD: 14.1 K/UL (ref 4.5–11.5)
WBC OTHER # BLD: 14.4 K/UL (ref 4.5–11.5)

## 2025-03-08 PROCEDURE — 71045 X-RAY EXAM CHEST 1 VIEW: CPT

## 2025-03-08 PROCEDURE — 88342 IMHCHEM/IMCYTCHM 1ST ANTB: CPT

## 2025-03-08 PROCEDURE — 88305 TISSUE EXAM BY PATHOLOGIST: CPT

## 2025-03-08 PROCEDURE — 2709999900 HC NON-CHARGEABLE SUPPLY: Performed by: INTERNAL MEDICINE

## 2025-03-08 PROCEDURE — 6360000002 HC RX W HCPCS

## 2025-03-08 PROCEDURE — 7100000010 HC PHASE II RECOVERY - FIRST 15 MIN: Performed by: INTERNAL MEDICINE

## 2025-03-08 PROCEDURE — 2500000003 HC RX 250 WO HCPCS: Performed by: HOSPITALIST

## 2025-03-08 PROCEDURE — 0DB78ZX EXCISION OF STOMACH, PYLORUS, VIA NATURAL OR ARTIFICIAL OPENING ENDOSCOPIC, DIAGNOSTIC: ICD-10-PCS | Performed by: INTERNAL MEDICINE

## 2025-03-08 PROCEDURE — 85014 HEMATOCRIT: CPT

## 2025-03-08 PROCEDURE — 0DB68ZX EXCISION OF STOMACH, VIA NATURAL OR ARTIFICIAL OPENING ENDOSCOPIC, DIAGNOSTIC: ICD-10-PCS | Performed by: INTERNAL MEDICINE

## 2025-03-08 PROCEDURE — 85018 HEMOGLOBIN: CPT

## 2025-03-08 PROCEDURE — 36415 COLL VENOUS BLD VENIPUNCTURE: CPT

## 2025-03-08 PROCEDURE — 6360000002 HC RX W HCPCS: Performed by: HOSPITALIST

## 2025-03-08 PROCEDURE — 3700000001 HC ADD 15 MINUTES (ANESTHESIA): Performed by: INTERNAL MEDICINE

## 2025-03-08 PROCEDURE — 3700000000 HC ANESTHESIA ATTENDED CARE: Performed by: INTERNAL MEDICINE

## 2025-03-08 PROCEDURE — 6360000002 HC RX W HCPCS: Performed by: NURSE ANESTHETIST, CERTIFIED REGISTERED

## 2025-03-08 PROCEDURE — 84100 ASSAY OF PHOSPHORUS: CPT

## 2025-03-08 PROCEDURE — 2060000000 HC ICU INTERMEDIATE R&B

## 2025-03-08 PROCEDURE — 85025 COMPLETE CBC W/AUTO DIFF WBC: CPT

## 2025-03-08 PROCEDURE — 3609012400 HC EGD TRANSORAL BIOPSY SINGLE/MULTIPLE: Performed by: INTERNAL MEDICINE

## 2025-03-08 PROCEDURE — 82962 GLUCOSE BLOOD TEST: CPT

## 2025-03-08 PROCEDURE — 99291 CRITICAL CARE FIRST HOUR: CPT | Performed by: INTERNAL MEDICINE

## 2025-03-08 PROCEDURE — 85379 FIBRIN DEGRADATION QUANT: CPT

## 2025-03-08 PROCEDURE — 85027 COMPLETE CBC AUTOMATED: CPT

## 2025-03-08 PROCEDURE — 84484 ASSAY OF TROPONIN QUANT: CPT

## 2025-03-08 PROCEDURE — 83735 ASSAY OF MAGNESIUM: CPT

## 2025-03-08 PROCEDURE — 2580000003 HC RX 258: Performed by: HOSPITALIST

## 2025-03-08 PROCEDURE — 2580000003 HC RX 258: Performed by: NURSE ANESTHETIST, CERTIFIED REGISTERED

## 2025-03-08 PROCEDURE — 6370000000 HC RX 637 (ALT 250 FOR IP)

## 2025-03-08 PROCEDURE — 7100000011 HC PHASE II RECOVERY - ADDTL 15 MIN: Performed by: INTERNAL MEDICINE

## 2025-03-08 PROCEDURE — 0DB58ZX EXCISION OF ESOPHAGUS, VIA NATURAL OR ARTIFICIAL OPENING ENDOSCOPIC, DIAGNOSTIC: ICD-10-PCS | Performed by: INTERNAL MEDICINE

## 2025-03-08 PROCEDURE — 80053 COMPREHEN METABOLIC PANEL: CPT

## 2025-03-08 PROCEDURE — 93005 ELECTROCARDIOGRAM TRACING: CPT | Performed by: INTERNAL MEDICINE

## 2025-03-08 RX ORDER — SODIUM CHLORIDE 9 MG/ML
INJECTION, SOLUTION INTRAVENOUS
Status: DISCONTINUED | OUTPATIENT
Start: 2025-03-08 | End: 2025-03-08 | Stop reason: SDUPTHER

## 2025-03-08 RX ORDER — LIDOCAINE HYDROCHLORIDE 20 MG/ML
INJECTION, SOLUTION INFILTRATION; PERINEURAL
Status: DISCONTINUED | OUTPATIENT
Start: 2025-03-08 | End: 2025-03-08 | Stop reason: SDUPTHER

## 2025-03-08 RX ORDER — PROPOFOL 10 MG/ML
INJECTION, EMULSION INTRAVENOUS
Status: DISCONTINUED | OUTPATIENT
Start: 2025-03-08 | End: 2025-03-08 | Stop reason: SDUPTHER

## 2025-03-08 RX ADMIN — INSULIN LISPRO 8 UNITS: 100 INJECTION, SOLUTION INTRAVENOUS; SUBCUTANEOUS at 16:59

## 2025-03-08 RX ADMIN — SODIUM CHLORIDE, PRESERVATIVE FREE 40 MG: 5 INJECTION INTRAVENOUS at 21:30

## 2025-03-08 RX ADMIN — INSULIN GLARGINE 28 UNITS: 100 INJECTION, SOLUTION SUBCUTANEOUS at 21:30

## 2025-03-08 RX ADMIN — SODIUM CHLORIDE, PRESERVATIVE FREE 40 MG: 5 INJECTION INTRAVENOUS at 08:06

## 2025-03-08 RX ADMIN — INSULIN LISPRO 6 UNITS: 100 INJECTION, SOLUTION INTRAVENOUS; SUBCUTANEOUS at 13:16

## 2025-03-08 RX ADMIN — PHENYLEPHRINE HYDROCHLORIDE 200 MCG: 10 INJECTION INTRAVENOUS at 12:18

## 2025-03-08 RX ADMIN — SODIUM CHLORIDE, PRESERVATIVE FREE 40 MG: 5 INJECTION INTRAVENOUS at 03:24

## 2025-03-08 RX ADMIN — SODIUM CHLORIDE, PRESERVATIVE FREE 40 MG: 5 INJECTION INTRAVENOUS at 13:15

## 2025-03-08 RX ADMIN — PROCHLORPERAZINE EDISYLATE 10 MG: 5 INJECTION INTRAMUSCULAR; INTRAVENOUS at 08:06

## 2025-03-08 RX ADMIN — PROPOFOL 50 MG: 10 INJECTION, EMULSION INTRAVENOUS at 12:16

## 2025-03-08 RX ADMIN — INSULIN GLARGINE 28 UNITS: 100 INJECTION, SOLUTION SUBCUTANEOUS at 13:14

## 2025-03-08 RX ADMIN — INSULIN LISPRO 2 UNITS: 100 INJECTION, SOLUTION INTRAVENOUS; SUBCUTANEOUS at 21:40

## 2025-03-08 RX ADMIN — DULOXETINE HYDROCHLORIDE 30 MG: 30 CAPSULE, DELAYED RELEASE ORAL at 21:30

## 2025-03-08 RX ADMIN — LEVOTHYROXINE SODIUM 50 MCG: 0.05 TABLET ORAL at 06:14

## 2025-03-08 RX ADMIN — LIDOCAINE HYDROCHLORIDE 100 MG: 20 INJECTION, SOLUTION INFILTRATION; PERINEURAL at 12:15

## 2025-03-08 RX ADMIN — TORSEMIDE 20 MG: 20 TABLET ORAL at 13:15

## 2025-03-08 RX ADMIN — PROPOFOL 100 MG: 10 INJECTION, EMULSION INTRAVENOUS at 12:15

## 2025-03-08 RX ADMIN — FERROUS SULFATE TAB 325 MG (65 MG ELEMENTAL FE) 325 MG: 325 (65 FE) TAB at 13:15

## 2025-03-08 RX ADMIN — PHENYLEPHRINE HYDROCHLORIDE 200 MCG: 10 INJECTION INTRAVENOUS at 12:29

## 2025-03-08 RX ADMIN — PHENYLEPHRINE HYDROCHLORIDE 200 MCG: 10 INJECTION INTRAVENOUS at 12:20

## 2025-03-08 RX ADMIN — SODIUM CHLORIDE: 9 INJECTION, SOLUTION INTRAVENOUS at 12:11

## 2025-03-08 ASSESSMENT — ENCOUNTER SYMPTOMS: SHORTNESS OF BREATH: 1

## 2025-03-08 ASSESSMENT — PAIN SCALES - GENERAL: PAINLEVEL_OUTOF10: 0

## 2025-03-08 NOTE — CODE DOCUMENTATION
@1531 a RRT was called by Dr. Liang who was rounding and heard the pt coughing and choking on water. Dr. Olmstead and Dr. Garcia at bedside for assessment of pt. Pt had a procedure done this morning and had been NPO. Pt was attempting to drink water and had a hard time swallowing initially. Pt is awake and answering questions appropriately at time of assessment. Pt vitals signs and blood sugar are as charted. Pt denied any pain or SOB. EKG was obtained and reviewed by Dr. Olmstead at bedside. STAT chest Xray preformed. Pt remained cara condition and did not require transfer to higher level of care at completion of RRT.

## 2025-03-08 NOTE — PLAN OF CARE
Problem: Safety - Adult  Goal: Free from fall injury  3/8/2025 1111 by Karina Sow RN  Outcome: Progressing     Problem: Chronic Conditions and Co-morbidities  Goal: Patient's chronic conditions and co-morbidity symptoms are monitored and maintained or improved  3/8/2025 1111 by Karina Sow RN  Outcome: Progressing     Problem: Discharge Planning  Goal: Discharge to home or other facility with appropriate resources  3/8/2025 1111 by Karina Sow RN  Outcome: Progressing     Problem: Pain  Goal: Verbalizes/displays adequate comfort level or baseline comfort level  3/8/2025 1111 by Karina Sow RN  Outcome: Progressing     Problem: Skin/Tissue Integrity  Goal: Skin integrity remains intact  Description: 1.  Monitor for areas of redness and/or skin breakdown  2.  Assess vascular access sites hourly  3.  Every 4-6 hours minimum:  Change oxygen saturation probe site  4.  Every 4-6 hours:  If on nasal continuous positive airway pressure, respiratory therapy assess nares and determine need for appliance change or resting period  3/8/2025 1111 by Karina Sow RN  Outcome: Progressing

## 2025-03-08 NOTE — PROGRESS NOTES
Department of Internal Medicine  PN    [facility]   Admitting Physician: Dr. Connor  Consultants: Dr. Harrison      CHIEF COMPLAINT:  fall    HISTORY OF PRESENT ILLNESS:    Patient presents to ER due to falling at home twice today; states he felt lightheaded/dizzy and felt ringing in his ears, he is unsure if he lost consciousness the first fall. The second fall his daughter helped him lower himself to the ground. When he initially arrived to ER he threw up blood and had bloody diarrhea (first occurrence-no history of GI bleed, has history of anemia, on xarelto for AFIB, denies ASA/ETOH/NSAID use, takes tylenol for pain) he also has some shortness of breath with exertion. He denies new allergies. Unsure of medications-[facility]. His daughter Gerda and her son are present at cart-side, all questions answered at this time. Patient does not use nicotine, alcohol, marijuana, or illicit drugs. Lives at home with Gerda, independent with ADLs uses cane and sometimes rollator, no home health services. He is agreeable to admission for evaluation of hematemesis.    3/7/2025  Patient seen examined on monitored bed in ED hold.  Patient currently down nuclear medicine for GI bleeding scan.  Patient still has some abdominal discomfort and the lightheadedness is improved.  BUN/creatinine 33/2.1 with normal electrolytes.  Blood sugars range 240-246.  Liver enzymes are normal with a WBC of 10.9 hemoglobin 10.5.  INR is 1.4.  Heart rate is 89 with blood pressure 147/79 with O2 sat 96% on room air at rest.  Patient's daughter is at the bedside.  Patient overall feels better but still very weak.  Unfortunately patient had a CTA of the abdomen with IV contrast and a CTA of the chest.  These did not show any evidence of GI bleed or PE or acute pulmonary problem.  There is no evidence any intraluminal hematoma or aneurysm.  Patient serum creatinine was 2.3 on admission.    3/8/2025  Patient seen examined on PCU/Endo.   3/7/2025 11:07:21 PM         ASSESSMENT:  Hematemesis-secondary to large gastric polyp with ulceration  2 gastric polyps-1.5 cm pedunculated polyp posterior gastric wall, 4-5 cm polyp with small ulceration in the antrum associated with active bleed-EGD with biopsy on 3/8/2025  Acute versus CKD stage IV-baseline creatinine 2.1 December 2023  Lactic acidosis  Mildly elevated troponin  Leukocytosis-resolved  Acute anemia-secondary to GI blood loss-large gastric polyp with ulceration on Xarelto  Accelerated junctional rhythm   Atrial fibrillation on xarelto  COPD on albuterol sulfate, olodatrol/tiotropium   HFpEF on torsemide  History of pernicious anemia on cyanocobalamin   History of iron deficiency anemia on ferrous sulfate  Depression on duloxetine   Type II insulin dependent diabetes mellitus on glargine  Hypothyroidism on levothyroxine  Class II obesity BMI 35.26 kg/m2    PLAN:  Admit to monitored floor. Home medications will be reconciled. Lab work ordered for morning. Antiemetics, antipyretics, as needed pain medicine, electrolyte supplementation, hypoglycemia recovery medications have been ordered. Consults will be made to gastroenterology, PT/OT. SCDs for VTE prophylaxis, pantoprazole for GI prophylaxis, xarelto on hold. He was given protonix, octreotide, balfexar, TXA, 1L V; 2RBCs ordered but held due to result of repeat H&H.     Home medication reviewed  H&H every 6 hours  Protonix drip  Glucoscans 4 times daily/scale insulin  IV fluids normal saline 60 cc an hour    Resume diet 3/8 post endoscopy  Patient will need transfer to tertiary center in the future regarding gastric polypectomies    CMP, CBC in a.m.    Jed Connor DO  12:00 PM  3/8/2025

## 2025-03-08 NOTE — ANESTHESIA POSTPROCEDURE EVALUATION
Department of Anesthesiology  Postprocedure Note    Patient: Brooke Vaughan  MRN: [id number]  YOB: 1945  Date of evaluation: 3/8/2025    Procedure Summary       Date: 03/08/25 Room / Location: [place] [address] / [facility]    Anesthesia Start: 1213 Anesthesia Stop: 1236    Procedure: ESOPHAGOGASTRODUODENOSCOPY BIOPSY Diagnosis:       Anemia, unspecified type      (Anemia, unspecified type [D64.9])    Surgeons: JEANNIE Harrison MD Responsible Provider: Salma Engle DO    Anesthesia Type: MAC ASA Status: 3            Anesthesia Type: No value filed.    Jasmeet Phase I:      Jasmeet Phase II: Jasmeet Score: 10    Anesthesia Post Evaluation    Patient location during evaluation: PACU  Patient participation: complete - patient participated  Level of consciousness: awake and alert  Airway patency: patent  Nausea & Vomiting: no nausea and no vomiting  Cardiovascular status: hemodynamically stable  Respiratory status: acceptable  Hydration status: euvolemic  Pain management: adequate    No notable events documented.

## 2025-03-08 NOTE — SIGNIFICANT EVENT
[facility] Hospitalist    Hospitalist RRT/Code Blue Note    Subjective:    Called to bedside since pt was drinking water and he started coughing and choking.  Patient was looking pale.  But his vitals were within normal he was saturating 98% on room air.       pantoprazole (PROTONIX) 40 mg in sodium chloride (PF) 0.9 % 10 mL injection  40 mg IntraVENous Q6H    DULoxetine  30 mg Oral Nightly    ferrous sulfate  325 mg Oral Daily with breakfast    insulin glargine  28 Units SubCUTAneous BID    levothyroxine  50 mcg Oral QAM AC    [Held by provider] rivaroxaban  15 mg Oral Dinner    torsemide  20 mg Oral Daily    insulin lispro  0-8 Units SubCUTAneous 4x Daily AC & HS    vitamin B-12  500 mcg Oral Every Other Day     sodium chloride, , PRN  acetaminophen, 650 mg, Q6H PRN  prochlorperazine, 10 mg, Q6H PRN  melatonin, 5 mg, Nightly PRN  albuterol, 2.5 mg, Q6H PRN  glucose, 4 tablet, PRN  dextrose bolus, 125 mL, PRN   Or  dextrose bolus, 250 mL, PRN  glucagon (rDNA), 1 mg, PRN  dextrose, , Continuous PRN         Objective:    /75   Pulse (!) 110   Temp 98.5 °F (36.9 °C) (Oral)   Resp 20   Ht 1.73 m (5' 8.11\")   Wt 117.9 kg (260 lb)   SpO2 100%   BMI 39.41 kg/m²   General Appearance: alert and oriented to person, place and time, pale  Head: normocephalic and atraumatic  Eyes: pupils equal, round, and reactive to light, extraocular eye movements intact, conjunctivae normal  Pulmonary/Chest: Coarse rhonchi on the right base  Cardiovascular: normal rate, regular rhythm, normal S1 and S2,  Abdomen: soft, non-tender, non-distended, normal bowel sounds, no masses or organomegaly  Extremities: no cyanosis, clubbing or edema      Recent Labs     03/06/25  1952 03/07/25  0715 03/08/25  0638    139 136   K 4.6 4.5 4.4    107 101   CO2 23 22 22   BUN 27* 33* 36*   CREATININE 2.3* 2.1* 2.1*   GLUCOSE 251* 240* 265*   CALCIUM 8.5* 8.0* 8.1*       Recent Labs     03/07/25  0715 03/07/25  1439

## 2025-03-08 NOTE — PROCEDURES
Procedure:  Esophagogastroduodenoscopy with Biopsy    Indication: Coffee-ground emesis on Xarelto    Sedation  MAC    Endoscope was advanced easily through mouth to second portion of duodenum      Oropharynx views are limited but grossly normal.    Esophagus:   Lampe-colored mucosa between 36 and 37 cm 2 tongues.  Biopsies taken.  GE junction at 37 cm    Stomach:   Moderate hyperemia in the gastric cardia                          On the posterior wall of the body approximately 52 cm from the incisors was a 1.5 cm pedunculated polyp with intense hyperemia.  This was biopsied only.  It was soft to biopsy.                          In the distal antrum was a 4 to 5 cm pedunculated polyp with an extremely broad base.  There was a 5 to 6 mm superficial ulcer on the polyp with flat spots as well as some fresh blood dispersed on the surface of the polyp.  This was biopsied only.  Some areas did feel slightly hard to biopsy.                          Patchy hyperemia in portions of the antrum as well as throughout the rest of the body.  Biopsies for Helicobacter taken                           Scattered polyps all less than 1 cm in the gastric body observed only.    Duodenum: Bulb is normal.    Second portion of duodenum is normal.    EBL: Less than 1 cc    IMPRESSION AND PLAN:     1.  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  Biopsies taken.  2.  Posterior wall distal gastric body pedunculated polyp 1.5 cm biopsied as above.  3.  Extremely large distal antral 4 to 5 cm polyp with small ulceration and fresh blood on the polyp.  Had an extremely broad base.  4.  Areas of intense hyperemia throughout the antrum and body biopsies for Helicobacter taken.  Additional small polyps in the gastric body observed only.  5.  Normal bulb and proximal small bowel    Recommendations: Await biopsy results particularly of the extremely large polyp.  Would hold Xarelto for the time being.  If biopsies are benign then would  recommend tertiary center for possible endoscopic resection of both these polyps as they will be an ongoing source of GI blood loss particularly on antiplatelet therapy.  Continue PPI.  If active bleeding would consider repeat endoscopy with therapeutics.  Ultimately will need definitive therapy however.  Further recommendations for repeat endoscopy for possible Avelar's surveillance in this 79-year-old with multiple medical problems depending upon pathology.      Follow up as outpatient in office, call [phone] to schedule for appointment.      JEANNIE Harrison MD  3/8/2025  12:26 PM      [id number] NOTE  Date: 3/8/2025   Name: Brooke Vaughan  YOB: 1945    Procedures

## 2025-03-08 NOTE — ANESTHESIA PRE PROCEDURE
Kwame Rubio MD   ammonium lactate (AMLACTIN) 12 % cream Apply 1 g topically 2 times daily Apply topically as needed.    Kwame Rubio MD   simethicone (MYLICON) 80 MG chewable tablet Take 1 tablet by mouth 4 times daily as needed for Flatulence    Kwame Rubio MD   melatonin 3 MG TABS tablet Take 1 tablet by mouth nightly    Kwame Rubio MD   levothyroxine (SYNTHROID) 50 MCG tablet Take 1 tablet by mouth every morning (before breakfast)    Kwame Rubio MD   vitamin B-12 (CYANOCOBALAMIN) 1000 MCG tablet Take 1 tablet by mouth daily    Kwame Rubio MD   Capsaicin 0.1 % CREA Apply 1 each topically 3 times daily    Kwame Rubio MD   benzonatate (TESSALON) 100 MG capsule Take 1 capsule by mouth 3 times daily as needed for Cough    Kwame Rubio MD   MENTHOL-METHYL SALICYLATE EX Apply 1 each topically 4 times daily    Kwame Rubio MD   carboxymethylcellulose (REFRESH PLUS) 0.5 % SOLN ophthalmic solution 1 drop 3 times daily    Kwame Rubio MD   albuterol sulfate  (90 Base) MCG/ACT inhaler Inhale 1-2 puffs into the lungs 4 times daily as needed for Wheezing    Kwame Rubio MD   albuterol (PROVENTIL) (2.5 MG/3ML) 0.083% nebulizer solution Take 3 mLs by nebulization every 6 hours as needed for Wheezing or Shortness of Breath    Kwame Rubio MD   insulin glargine (LANTUS) 100 UNIT/ML injection vial Inject 46 Units into the skin nightly    Kwame Rubio MD   polyethylene glycol (GLYCOLAX) powder Take 17 g by mouth daily    Kwame Rubio MD   tamsulosin (FLOMAX) 0.4 MG capsule Take 1 capsule by mouth nightly    Kwame Rubio MD   amiodarone (CORDARONE) 200 MG tablet Take 1 tablet by mouth daily    Kwame Rubio MD   aspirin 81 MG EC tablet Take 1 tablet by mouth daily    Kwame Rubio MD   cetirizine (ZYRTEC) 10 MG tablet Take 1 tablet by mouth daily    Kwame Rubio

## 2025-03-08 NOTE — PROGRESS NOTES
4 Eyes Skin Assessment     NAME:  Brooke Vaughan  YOB: 1945  MEDICAL RECORD NUMBER:  [id number]    The patient is being assessed for  Admission    I agree that at least one RN has performed a thorough Head to Toe Skin Assessment on the patient. ALL assessment sites listed below have been assessed.      Areas assessed by both nurses:    Head, Face, Ears, Shoulders, Back, Chest, Arms, Elbows, Hands, Sacrum. Buttock, Coccyx, Ischium, Legs. Feet and Heels, and Under Medical Devices         Does the Patient have a Wound? No noted wound(s) Scattered scabs and scars. Left hand skin tear with bandage applied.        Tim Prevention initiated by RN: No  Wound Care Orders initiated by RN: No    Pressure Injury (Stage 3,4, Unstageable, DTI, NWPT, and Complex wounds) if present, place Wound referral order by RN under : No    New Ostomies, if present place, Ostomy referral order under : No     Nurse 1 eSignature: Electronically signed by Gala Hussein RN on 3/8/25 at 1:43 AM EST    **SHARE this note so that the co-signing nurse can place an eSignature**    Nurse 2 eSignature: Electronically signed by Yury Niño RN on 3/8/25 at 1:45 AM EST

## 2025-03-08 NOTE — H&P
Immediately prior to the procedure the patient's History and Physical was reviewed- there are no changes with the current vitals.Blood pressure 126/60, pulse (!) 103, temperature 98.7 °F (37.1 °C), temperature source Oral, resp. rate 20, height 1.73 m (5' 8.11\"), weight 117.9 kg (260 lb), SpO2 95%.

## 2025-03-09 LAB
ALBUMIN SERPL-MCNC: 3.3 G/DL (ref 3.5–5.2)
ALP SERPL-CCNC: 51 U/L (ref 40–129)
ALT SERPL-CCNC: 11 U/L (ref 0–40)
ANION GAP SERPL CALCULATED.3IONS-SCNC: 10 MMOL/L (ref 7–16)
AST SERPL-CCNC: 19 U/L (ref 0–39)
BASOPHILS # BLD: 0.03 K/UL (ref 0–0.2)
BASOPHILS NFR BLD: 0 % (ref 0–2)
BILIRUB SERPL-MCNC: 0.4 MG/DL (ref 0–1.2)
BUN SERPL-MCNC: 29 MG/DL (ref 6–23)
CALCIUM SERPL-MCNC: 7.9 MG/DL (ref 8.6–10.2)
CHLORIDE SERPL-SCNC: 106 MMOL/L (ref 98–107)
CO2 SERPL-SCNC: 25 MMOL/L (ref 22–29)
CREAT SERPL-MCNC: 2.2 MG/DL (ref 0.7–1.2)
EOSINOPHIL # BLD: 0.14 K/UL (ref 0.05–0.5)
EOSINOPHILS RELATIVE PERCENT: 1 % (ref 0–6)
ERYTHROCYTE [DISTWIDTH] IN BLOOD BY AUTOMATED COUNT: 15 % (ref 11.5–15)
GFR, ESTIMATED: 29 ML/MIN/1.73M2
GLUCOSE BLD-MCNC: 133 MG/DL (ref 74–99)
GLUCOSE BLD-MCNC: 177 MG/DL (ref 74–99)
GLUCOSE BLD-MCNC: 220 MG/DL (ref 74–99)
GLUCOSE BLD-MCNC: 246 MG/DL (ref 74–99)
GLUCOSE SERPL-MCNC: 113 MG/DL (ref 74–99)
HCT VFR BLD AUTO: 20.3 % (ref 37–54)
HCT VFR BLD AUTO: 24 % (ref 37–54)
HCT VFR BLD AUTO: 24.3 % (ref 37–54)
HGB BLD-MCNC: 6.6 G/DL (ref 12.5–16.5)
HGB BLD-MCNC: 7.9 G/DL (ref 12.5–16.5)
HGB BLD-MCNC: 8 G/DL (ref 12.5–16.5)
LYMPHOCYTES NFR BLD: 3.54 K/UL (ref 1.5–4)
LYMPHOCYTES RELATIVE PERCENT: 36 % (ref 20–42)
MAGNESIUM SERPL-MCNC: 2 MG/DL (ref 1.6–2.6)
MCH RBC QN AUTO: 34.4 PG (ref 26–35)
MCHC RBC AUTO-ENTMCNC: 32.5 G/DL (ref 32–34.5)
MCV RBC AUTO: 105.7 FL (ref 80–99.9)
MONOCYTES NFR BLD: 0.59 K/UL (ref 0.1–0.95)
MONOCYTES NFR BLD: 6 % (ref 2–12)
NEUTROPHILS NFR BLD: 56 % (ref 43–80)
NEUTS SEG NFR BLD: 5.51 K/UL (ref 1.8–7.3)
PHOSPHATE SERPL-MCNC: 2.5 MG/DL (ref 2.5–4.5)
PLATELET # BLD AUTO: 96 K/UL (ref 130–450)
PLATELET CONFIRMATION: NORMAL
PMV BLD AUTO: 9.2 FL (ref 7–12)
POTASSIUM SERPL-SCNC: 3.6 MMOL/L (ref 3.5–5)
PROT SERPL-MCNC: 4.9 G/DL (ref 6.4–8.3)
RBC # BLD AUTO: 1.92 M/UL (ref 3.8–5.8)
RBC # BLD: ABNORMAL 10*6/UL
SODIUM SERPL-SCNC: 141 MMOL/L (ref 132–146)
WBC OTHER # BLD: 9.9 K/UL (ref 4.5–11.5)

## 2025-03-09 PROCEDURE — 85018 HEMOGLOBIN: CPT

## 2025-03-09 PROCEDURE — 85014 HEMATOCRIT: CPT

## 2025-03-09 PROCEDURE — 6370000000 HC RX 637 (ALT 250 FOR IP): Performed by: INTERNAL MEDICINE

## 2025-03-09 PROCEDURE — 82962 GLUCOSE BLOOD TEST: CPT

## 2025-03-09 PROCEDURE — 36415 COLL VENOUS BLD VENIPUNCTURE: CPT

## 2025-03-09 PROCEDURE — 6370000000 HC RX 637 (ALT 250 FOR IP)

## 2025-03-09 PROCEDURE — P9016 RBC LEUKOCYTES REDUCED: HCPCS

## 2025-03-09 PROCEDURE — 83735 ASSAY OF MAGNESIUM: CPT

## 2025-03-09 PROCEDURE — 2060000000 HC ICU INTERMEDIATE R&B

## 2025-03-09 PROCEDURE — 2500000003 HC RX 250 WO HCPCS: Performed by: HOSPITALIST

## 2025-03-09 PROCEDURE — 85025 COMPLETE CBC W/AUTO DIFF WBC: CPT

## 2025-03-09 PROCEDURE — 6360000002 HC RX W HCPCS: Performed by: HOSPITALIST

## 2025-03-09 PROCEDURE — 36430 TRANSFUSION BLD/BLD COMPNT: CPT

## 2025-03-09 PROCEDURE — 2580000003 HC RX 258: Performed by: HOSPITALIST

## 2025-03-09 PROCEDURE — 84100 ASSAY OF PHOSPHORUS: CPT

## 2025-03-09 PROCEDURE — 80053 COMPREHEN METABOLIC PANEL: CPT

## 2025-03-09 PROCEDURE — 2580000003 HC RX 258: Performed by: INTERNAL MEDICINE

## 2025-03-09 RX ORDER — SODIUM CHLORIDE 9 MG/ML
INJECTION, SOLUTION INTRAVENOUS PRN
Status: DISCONTINUED | OUTPATIENT
Start: 2025-03-09 | End: 2025-03-15 | Stop reason: HOSPADM

## 2025-03-09 RX ADMIN — TORSEMIDE 20 MG: 20 TABLET ORAL at 08:08

## 2025-03-09 RX ADMIN — DULOXETINE HYDROCHLORIDE 30 MG: 30 CAPSULE, DELAYED RELEASE ORAL at 21:23

## 2025-03-09 RX ADMIN — INSULIN GLARGINE 28 UNITS: 100 INJECTION, SOLUTION SUBCUTANEOUS at 08:07

## 2025-03-09 RX ADMIN — SODIUM CHLORIDE: 9 INJECTION, SOLUTION INTRAVENOUS at 21:32

## 2025-03-09 RX ADMIN — CYANOCOBALAMIN TAB 1000 MCG 500 MCG: 1000 TAB at 08:08

## 2025-03-09 RX ADMIN — SODIUM CHLORIDE, PRESERVATIVE FREE 40 MG: 5 INJECTION INTRAVENOUS at 21:23

## 2025-03-09 RX ADMIN — SODIUM CHLORIDE: 9 INJECTION, SOLUTION INTRAVENOUS at 04:49

## 2025-03-09 RX ADMIN — SODIUM CHLORIDE, PRESERVATIVE FREE 40 MG: 5 INJECTION INTRAVENOUS at 05:46

## 2025-03-09 RX ADMIN — LEVOTHYROXINE SODIUM 50 MCG: 0.05 TABLET ORAL at 05:46

## 2025-03-09 RX ADMIN — SODIUM CHLORIDE, PRESERVATIVE FREE 40 MG: 5 INJECTION INTRAVENOUS at 12:13

## 2025-03-09 RX ADMIN — INSULIN LISPRO 2 UNITS: 100 INJECTION, SOLUTION INTRAVENOUS; SUBCUTANEOUS at 17:41

## 2025-03-09 RX ADMIN — FERROUS SULFATE TAB 325 MG (65 MG ELEMENTAL FE) 325 MG: 325 (65 FE) TAB at 08:08

## 2025-03-09 RX ADMIN — SODIUM CHLORIDE, PRESERVATIVE FREE 40 MG: 5 INJECTION INTRAVENOUS at 08:06

## 2025-03-09 RX ADMIN — INSULIN GLARGINE 28 UNITS: 100 INJECTION, SOLUTION SUBCUTANEOUS at 21:24

## 2025-03-09 RX ADMIN — POLYVINYL ALCOHOL, POVIDONE 1 DROP: 14; 6 SOLUTION/ DROPS OPHTHALMIC at 17:40

## 2025-03-09 RX ADMIN — INSULIN LISPRO 2 UNITS: 100 INJECTION, SOLUTION INTRAVENOUS; SUBCUTANEOUS at 12:14

## 2025-03-09 NOTE — PROGRESS NOTES
Name:  Brooke Vaughan  :  1945  MRN:  [id number]  Room:  [address] [place] [address]-  DOS:  3/9/2025    [facility]  [facility]  Dr. Kristen Lowery M.D.  Dr. Jose Dyson M.D.  Dr. Nelson Armenta D.O.  Dr. Jean-Pierre Harrison M.D.  Dr. José Manuel Beaulieu D.O.    -NP Progress Note-    PCP:  Betty Rodríguez (Inactive)  Admitting Physician:  Jed Connor DO  Chief Complaint:    Chief Complaint   Patient presents with    Hematemesis     EMS states that they were called for fall. Pt started to vomit blood upon arrival        Subjective  Patient resting in bed.  Easily awakened.  Family at bedside.  Denies abdominal pain.  Tolerated diet this morning.  Denies nausea or vomiting.    Physical Examination  Vitals:  /64   Pulse 89   Temp 98.7 °F (37.1 °C) (Oral)   Resp 20   Ht 1.73 m (5' 8.11\")   Wt 117.9 kg (260 lb)   SpO2 100%   BMI 39.41 kg/m²   General Appearance:  awake, alert, and oriented to person, place, time, and purpose; appears stated age and cooperative; no apparent distress no labored breathing  HEENT:  PERRL; EOMI; sclera clear; buccal mucosa moist  Neck:  supple; trachea midline; no thyromegaly; no JVD; no bruits  Heart:  rhythm regular; rate controlled; no murmurs  Lungs:  symmetrical; clear to auscultation bilaterally; no wheezes; no rhonchi; no rales  Abdomen:  soft, non-tender, non-distended; bowel sounds positive; no organomegaly or masses; no pain on palpation  Extremities:  peripheral pulses present; no peripheral edema; no ulcers  Neurologic:  alert and oriented x 3; no focal deficit; cranial nerves grossly intact  Skin:  no petechia; no hemorrhage; no wounds    Medications  Scheduled Meds    pantoprazole (PROTONIX) 40 mg in sodium chloride (PF) 0.9 % 10 mL injection  40 mg IntraVENous Q6H    DULoxetine  30 mg Oral Nightly    ferrous sulfate  325 mg Oral Daily with breakfast    insulin glargine  28 Units SubCUTAneous BID    levothyroxine  50 mcg Oral QAM AC    [Held by  mg/dL    Est, Glom Filt Rate 31 (L) >60 mL/min/1.73m2    Calcium 8.1 (L) 8.6 - 10.2 mg/dL    Total Protein 5.0 (L) 6.4 - 8.3 g/dL    Albumin 3.5 3.5 - 5.2 g/dL    Total Bilirubin 0.5 0.0 - 1.2 mg/dL    Alkaline Phosphatase 53 40 - 129 U/L    ALT 12 0 - 40 U/L    AST 18 0 - 39 U/L   POCT Glucose    Collection Time: 03/08/25  1:12 PM   Result Value Ref Range    POC Glucose 327 (H) 74 - 99 mg/dL   POCT Glucose    Collection Time: 03/08/25  3:33 PM   Result Value Ref Range    POC Glucose 405 (H) 74 - 99 mg/dL   CBC    Collection Time: 03/08/25  3:57 PM   Result Value Ref Range    WBC 14.4 (H) 4.5 - 11.5 k/uL    RBC 2.29 (L) 3.80 - 5.80 m/uL    Hemoglobin 7.9 (L) 12.5 - 16.5 g/dL    Hematocrit 23.4 (L) 37.0 - 54.0 %    .2 (H) 80.0 - 99.9 fL    MCH 34.5 26.0 - 35.0 pg    MCHC 33.8 32.0 - 34.5 g/dL    RDW 14.8 11.5 - 15.0 %    Platelets 136 130 - 450 k/uL    MPV 9.5 7.0 - 12.0 fL   Comprehensive Metabolic Panel    Collection Time: 03/08/25  3:57 PM   Result Value Ref Range    Sodium 137 132 - 146 mmol/L    Potassium 4.5 3.5 - 5.0 mmol/L    Chloride 105 98 - 107 mmol/L    CO2 21 (L) 22 - 29 mmol/L    Anion Gap 11 7 - 16 mmol/L    Glucose 365 (H) 74 - 99 mg/dL    BUN 35 (H) 6 - 23 mg/dL    Creatinine 2.2 (H) 0.70 - 1.20 mg/dL    Est, Glom Filt Rate 29 (L) >60 mL/min/1.73m2    Calcium 7.9 (L) 8.6 - 10.2 mg/dL    Total Protein 5.2 (L) 6.4 - 8.3 g/dL    Albumin 3.4 (L) 3.5 - 5.2 g/dL    Total Bilirubin 0.5 0.0 - 1.2 mg/dL    Alkaline Phosphatase 52 40 - 129 U/L    ALT 12 0 - 40 U/L    AST 19 0 - 39 U/L   D-Dimer, Quantitative    Collection Time: 03/08/25  3:57 PM   Result Value Ref Range    D-Dimer, Quant <200 0 - 230 ng/mL DDU   Troponin    Collection Time: 03/08/25  3:57 PM   Result Value Ref Range    Troponin, High Sensitivity 30 (H) 0 - 11 ng/L   POCT Glucose    Collection Time: 03/08/25  4:59 PM   Result Value Ref Range    POC Glucose 358 (H) 74 - 99 mg/dL   Troponin    Collection Time: 03/08/25  5:18 PM   Result  acute intraperitoneal retroperitoneal process in the abdomen or in the pelvis.     CTA CHEST W CONTRAST  Result Date: 3/6/2025  EXAMINATION: CTA OF THE ABDOMEN AND PELVIS WITH CONTRAST; CTA OF THE CHEST 3/6/2025 8:39 pm: TECHNIQUE: CTA of the abdomen and pelvis was performed with the administration of intravenous contrast. Multiplanar reformatted images are provided for review. MIP images are provided for review. Automated exposure control, iterative reconstruction, and/or weight based adjustment of the mA/kV was utilized to reduce the radiation dose to as low as reasonably achievable.; CTA of the chest was performed after the administration of intravenous contrast. Multiplanar reformatted images are provided for review.  MIP images are provided for review. Automated exposure control, iterative reconstruction, and/or weight based adjustment of the mA/kV was utilized to reduce the radiation dose to as low as reasonably achievable. COMPARISON: None. HISTORY: ORDERING SYSTEM PROVIDED HISTORY: gi bleed TECHNOLOGIST PROVIDED HISTORY: Reason for exam:->gi bleed Additional Contrast?->None FINDINGS: 1.  CTA AORTA: THORACIC/ABDOMINAL/ILIAC VESSELS: Precontrast demonstrate no evidence for acute intramural hematoma in the wall of the thoracic aorta/abdominal aorta/iliac arteries. After IV contrast administration there is no indication for a aneurysm formation or dissection thoracic aorta/abdominal aorta/iliac arteries.  There is good contrast runoff into visualized proximal right left superficial and profunda femoral arteries. Great vessels appear unremarkable. There are good contrast enhancement for the celiac axis SMA, right and left renal arteries, and JESSE without indication for stenosis. In the precontrast study there are hyperdense contents in the stomach which more likely is relate with un digested medicine pill. After IV contrast administration, during the arterial phase cannot see conspicuously a point of bleeding

## 2025-03-09 NOTE — PROGRESS NOTES
Department of Internal Medicine  PN    [facility]   Admitting Physician: Dr. Connor  Consultants: Dr. Harrison      CHIEF COMPLAINT:  fall    HISTORY OF PRESENT ILLNESS:    Patient presents to ER due to falling at home twice today; states he felt lightheaded/dizzy and felt ringing in his ears, he is unsure if he lost consciousness the first fall. The second fall his daughter helped him lower himself to the ground. When he initially arrived to ER he threw up blood and had bloody diarrhea (first occurrence-no history of GI bleed, has history of anemia, on xarelto for AFIB, denies ASA/ETOH/NSAID use, takes tylenol for pain) he also has some shortness of breath with exertion. He denies new allergies. Unsure of medications-[facility]. His daughter Gerda and her son are present at cart-side, all questions answered at this time. Patient does not use nicotine, alcohol, marijuana, or illicit drugs. Lives at home with Gerda, independent with ADLs uses cane and sometimes rollator, no home health services. He is agreeable to admission for evaluation of hematemesis.    3/7/2025  Patient seen examined on monitored bed in ED hold.  Patient currently down nuclear medicine for GI bleeding scan.  Patient still has some abdominal discomfort and the lightheadedness is improved.  BUN/creatinine 33/2.1 with normal electrolytes.  Blood sugars range 240-246.  Liver enzymes are normal with a WBC of 10.9 hemoglobin 10.5.  INR is 1.4.  Heart rate is 89 with blood pressure 147/79 with O2 sat 96% on room air at rest.  Patient's daughter is at the bedside.  Patient overall feels better but still very weak.  Unfortunately patient had a CTA of the abdomen with IV contrast and a CTA of the chest.  These did not show any evidence of GI bleed or PE or acute pulmonary problem.  There is no evidence any intraluminal hematoma or aneurysm.  Patient serum creatinine was 2.3 on admission.    3/8/2025  Patient seen examined on PCU/Endo.   Patient seen immediately post scope.  Patient had distal gastric body pedunculated polyp 1.5 cm noted which was biopsied and extremely large distal antral 4-5 cm polyp with small ulceration with fresh blood noted on the polyp.  Patient had extremely broad base to it.  BUN/creatinine 36/2.1 with blood sugars ranging 240-283.  Liver enzymes are normal with a WBC of 14.1 hemoglobin 8.6.  Temp 98.7 with heart rate of 100 blood pressure 126/60.  O2 sat 95% room air at rest.  Stools were positive for occult blood.  Case was discussed with the endoscopist in detail.  There would be no removal of the polyp at this time because the recent history of Xarelto.  He is recommending transfer to a tertiary center because of the size of the base of the problem.  Patient is doing well post endoscopy.  Patient still sedated.    3/9/2025  Patient seen examined on PCU.  Patient's significant other is at the bedside and case discussed.  Patient feels very tired and weak.  Patient short of breath with any activity.  Temperature is 97.5 with heart rate 82 blood pressure 125/67.  O2 sat 98% room air at rest.  BUN/creatinine 29/2.2 blood sugars range 113-246.  Transaminase normal with a WBC 9.9 hemoglobin 6.6.  GI note reviewed.    PAST MEDICAL Hx:  Past Medical History:   Diagnosis Date    Arthritis     Atrial fibrillation (HCC)     Diabetes mellitus type 2, noninsulin dependent (HCC)     Dyspnea     H/O cardiovascular stress test 07/24/2023    Lexiscan    History of cardiovascular stress test 10/05/2018    Lexiscan stress test    History of echocardiogram 12/28/2017    EF  55%    Hyperlipidemia     Hypertension     Preoperative clearance     on chart for surgery with KRISHAN SAMSON [place] 12/2015       PAST SURGICAL Hx:   Past Surgical History:   Procedure Laterality Date    CARDIAC CATHETERIZATION  04/26/2012    CARDIAC CATHETERIZATION  10/12/2018    Dr. Soler    CARDIOVASCULAR STRESS TEST  08/11    CHOLECYSTECTOMY      DIAGNOSTIC CARDIAC CATH LAB  There is a redundant the sigmoid colon which is anatomic variation. No indication for pulling of contrast seen in the portal venous phase. 2.  CTA CHEST: The contrast density was target to evaluate the thoracic aorta and not the pulmonary artery circulation.  The contrast is limited to evaluate more distal segmental branches of the pulmonary artery circulation bilateral but there is no conspicuous acute central pulmonary embolus in the main PA, right and left main PAs, in the lobar, segmental branches more proximally. The heart is normal size.  There is a lipomatosis of the inter atrial septum which is a form of anatomical variation.  Calcifications are seen in the coronary arteries. Calcified lymph nodes/granulomas are seen in mediastinum.  No mediastinal masses are seen. There are unremarkable appearance for the great vessels. Lungs are normally expanded.  There is a small peripheral triangular shaped density in the posterior aspect of the right upper lobe posterior segment which appears to be more likely a small area of residual atelectasis.  No acute infiltrates or consolidations are seen in. There is no pleural effusions. Small calcified granuloma is seen in the right middle lobe. 3.  CT ABDOMEN/PELVIS: The liver has normal size, density with normal pattern of arterial and portal venous phase contrast enhancement.  No focal lesions are seen. Patient had previous cholecystectomy.  The biliary tree is not dilated. There is a diverticula in the 2nd segment of the duodenum where the papilla is located. There extensive fat replacement of the pancreas.  No conspicuous focal lesions were seen in the pancreas.  The pancreatic ductal system is not dilated the.  There is small foci of calcification in the tail of the pancreas which can be manifestation of previous pancreatitis. The spleen has a few calcified granulomas and appear unremarkable. The adrenals not enlarged. Kidneys maintain overall preserved size but there

## 2025-03-10 LAB
ABO/RH: NORMAL
ALBUMIN SERPL-MCNC: 3.1 G/DL (ref 3.5–5.2)
ALP SERPL-CCNC: 49 U/L (ref 40–129)
ALT SERPL-CCNC: 14 U/L (ref 0–40)
ANION GAP SERPL CALCULATED.3IONS-SCNC: 9 MMOL/L (ref 7–16)
ANTIBODY SCREEN: NEGATIVE
ARM BAND NUMBER: NORMAL
AST SERPL-CCNC: 25 U/L (ref 0–39)
BASOPHILS # BLD: 0.06 K/UL (ref 0–0.2)
BASOPHILS NFR BLD: 1 % (ref 0–2)
BILIRUB SERPL-MCNC: 0.5 MG/DL (ref 0–1.2)
BLOOD BANK BLOOD PRODUCT EXPIRATION DATE: NORMAL
BLOOD BANK DISPENSE STATUS: NORMAL
BLOOD BANK ISBT PRODUCT BLOOD TYPE: 6200
BLOOD BANK PRODUCT CODE: NORMAL
BLOOD BANK SAMPLE EXPIRATION: NORMAL
BLOOD BANK UNIT TYPE AND RH: NORMAL
BPU ID: NORMAL
BUN SERPL-MCNC: 22 MG/DL (ref 6–23)
CALCIUM SERPL-MCNC: 8.1 MG/DL (ref 8.6–10.2)
CHLORIDE SERPL-SCNC: 106 MMOL/L (ref 98–107)
CO2 SERPL-SCNC: 25 MMOL/L (ref 22–29)
COMPONENT: NORMAL
CREAT SERPL-MCNC: 2 MG/DL (ref 0.7–1.2)
CROSSMATCH RESULT: NORMAL
EKG ATRIAL RATE: 102 BPM
EKG Q-T INTERVAL: 432 MS
EKG QRS DURATION: 86 MS
EKG QTC CALCULATION (BAZETT): 573 MS
EKG R AXIS: -21 DEGREES
EKG T AXIS: 114 DEGREES
EKG VENTRICULAR RATE: 106 BPM
EOSINOPHIL # BLD: 0.12 K/UL (ref 0.05–0.5)
EOSINOPHILS RELATIVE PERCENT: 2 % (ref 0–6)
ERYTHROCYTE [DISTWIDTH] IN BLOOD BY AUTOMATED COUNT: 16.6 % (ref 11.5–15)
GFR, ESTIMATED: 33 ML/MIN/1.73M2
GLUCOSE BLD-MCNC: 135 MG/DL (ref 74–99)
GLUCOSE BLD-MCNC: 237 MG/DL (ref 74–99)
GLUCOSE BLD-MCNC: 243 MG/DL (ref 74–99)
GLUCOSE BLD-MCNC: 274 MG/DL (ref 74–99)
GLUCOSE SERPL-MCNC: 85 MG/DL (ref 74–99)
HCT VFR BLD AUTO: 23.4 % (ref 37–54)
HGB BLD-MCNC: 7.6 G/DL (ref 12.5–16.5)
LYMPHOCYTES NFR BLD: 2.33 K/UL (ref 1.5–4)
LYMPHOCYTES RELATIVE PERCENT: 33 % (ref 20–42)
MCH RBC QN AUTO: 33.5 PG (ref 26–35)
MCHC RBC AUTO-ENTMCNC: 32.5 G/DL (ref 32–34.5)
MCV RBC AUTO: 103.1 FL (ref 80–99.9)
MONOCYTES NFR BLD: 0.12 K/UL (ref 0.1–0.95)
MONOCYTES NFR BLD: 2 % (ref 2–12)
MYELOCYTES ABSOLUTE COUNT: 0.06 K/UL
MYELOCYTES: 1 %
NEUTROPHILS NFR BLD: 61 % (ref 43–80)
NEUTS SEG NFR BLD: 4.3 K/UL (ref 1.8–7.3)
NUCLEATED RED BLOOD CELLS: 3 PER 100 WBC
PLATELET # BLD AUTO: 77 K/UL (ref 130–450)
PLATELET CONFIRMATION: NORMAL
PMV BLD AUTO: 9.4 FL (ref 7–12)
POTASSIUM SERPL-SCNC: 3.8 MMOL/L (ref 3.5–5)
PROT SERPL-MCNC: 4.9 G/DL (ref 6.4–8.3)
RBC # BLD AUTO: 2.27 M/UL (ref 3.8–5.8)
RBC # BLD: NORMAL 10*6/UL
SODIUM SERPL-SCNC: 140 MMOL/L (ref 132–146)
TRANSFUSION STATUS: NORMAL
UNIT DIVISION: 0
UNIT ISSUE DATE/TIME: NORMAL
UNIT TAG COMMENT: NORMAL
UNIT TAG COMMENT: NORMAL
WBC OTHER # BLD: 7 K/UL (ref 4.5–11.5)

## 2025-03-10 PROCEDURE — 2500000003 HC RX 250 WO HCPCS: Performed by: HOSPITALIST

## 2025-03-10 PROCEDURE — 36415 COLL VENOUS BLD VENIPUNCTURE: CPT

## 2025-03-10 PROCEDURE — 2060000000 HC ICU INTERMEDIATE R&B

## 2025-03-10 PROCEDURE — 82962 GLUCOSE BLOOD TEST: CPT

## 2025-03-10 PROCEDURE — 2580000003 HC RX 258: Performed by: HOSPITALIST

## 2025-03-10 PROCEDURE — 80053 COMPREHEN METABOLIC PANEL: CPT

## 2025-03-10 PROCEDURE — 6360000002 HC RX W HCPCS: Performed by: HOSPITALIST

## 2025-03-10 PROCEDURE — 85025 COMPLETE CBC W/AUTO DIFF WBC: CPT

## 2025-03-10 PROCEDURE — 6370000000 HC RX 637 (ALT 250 FOR IP)

## 2025-03-10 RX ADMIN — INSULIN GLARGINE 28 UNITS: 100 INJECTION, SOLUTION SUBCUTANEOUS at 21:16

## 2025-03-10 RX ADMIN — TORSEMIDE 20 MG: 20 TABLET ORAL at 07:16

## 2025-03-10 RX ADMIN — LEVOTHYROXINE SODIUM 50 MCG: 0.05 TABLET ORAL at 05:21

## 2025-03-10 RX ADMIN — INSULIN GLARGINE 28 UNITS: 100 INJECTION, SOLUTION SUBCUTANEOUS at 08:57

## 2025-03-10 RX ADMIN — SODIUM CHLORIDE, PRESERVATIVE FREE 40 MG: 5 INJECTION INTRAVENOUS at 11:56

## 2025-03-10 RX ADMIN — SODIUM CHLORIDE, PRESERVATIVE FREE 40 MG: 5 INJECTION INTRAVENOUS at 17:03

## 2025-03-10 RX ADMIN — FERROUS SULFATE TAB 325 MG (65 MG ELEMENTAL FE) 325 MG: 325 (65 FE) TAB at 07:16

## 2025-03-10 RX ADMIN — SODIUM CHLORIDE, PRESERVATIVE FREE 40 MG: 5 INJECTION INTRAVENOUS at 05:20

## 2025-03-10 RX ADMIN — INSULIN LISPRO 2 UNITS: 100 INJECTION, SOLUTION INTRAVENOUS; SUBCUTANEOUS at 17:03

## 2025-03-10 RX ADMIN — INSULIN LISPRO 4 UNITS: 100 INJECTION, SOLUTION INTRAVENOUS; SUBCUTANEOUS at 21:18

## 2025-03-10 RX ADMIN — INSULIN LISPRO 2 UNITS: 100 INJECTION, SOLUTION INTRAVENOUS; SUBCUTANEOUS at 11:55

## 2025-03-10 RX ADMIN — DULOXETINE HYDROCHLORIDE 30 MG: 30 CAPSULE, DELAYED RELEASE ORAL at 20:50

## 2025-03-10 ASSESSMENT — PAIN SCALES - GENERAL: PAINLEVEL_OUTOF10: 0

## 2025-03-10 NOTE — CARE COORDINATION
Case Management Assessment  Initial Evaluation    Date/Time of Evaluation: 3/10/2025 3:06 PM  Assessment Completed by: MARLEE Hugo    If patient is discharged prior to next notation, then this note serves as note for discharge by case management.    Patient Name: Brooke Vaughan                   YOB: 1945  Diagnosis: Hematemesis [K92.0]  Fall, initial encounter [W19.XXXA]  Gastrointestinal hemorrhage with hematemesis [K92.0]                   Date / Time: 3/6/2025  7:37 PM    Patient Admission Status: Inpatient   Readmission Risk (Low < 19, Mod (19-27), High > 27): Readmission Risk Score: 20.1    Current PCP: Betty Rodríguez (Inactive)  PCP verified by CM? Yes    Chart Reviewed: Yes      History Provided by: Child/Family  Patient Orientation: Other (see comment) (Per dtr, pt A&Ox4.)    Patient Cognition: Other (see comment) (Per dtr alert)    Hospitalization in the last 30 days (Readmission):  No    If yes, Readmission Assessment in CM Navigator will be completed.    Advance Directives:      Code Status: Full Code   Patient's Primary Decision Maker is: Named in Scanned ACP Document (Has hcpoa in place if/when pt is unable to make own decisions.)    Primary Decision Maker: Gerda Saucedo - Child - [phone]    Discharge Planning:    Patient lives with: Children Type of Home: House  Primary Care Giver: Family  Patient Support Systems include: Children   Current Financial resources:    Current community resources:    Current services prior to admission: C-pap            Current DME:              Type of Home Care services:  None    ADLS  Prior functional level: Assistance with the following:, Cooking, Housework, Shopping, Other (see comment) (transportation)  Current functional level: Cooking, Housework, Shopping, Other (see comment) (transportaiton)    PT AM-PAC:   /24  OT AM-PAC:   /24    Family can provide assistance at DC: Yes  Would you like Case Management to discuss the discharge plan with any

## 2025-03-10 NOTE — ACP (ADVANCE CARE PLANNING)
Advance Care Planning   Healthcare Decision Maker:    Primary Decision Maker: Gerda Saucedo - [phone]    Click here to complete Healthcare Decision Makers including selection of the Healthcare Decision Maker Relationship (ie \"Primary\").  Today we documented Decision Maker(s) consistent with ACP documents on file.

## 2025-03-10 NOTE — PROGRESS NOTES
Department of Internal Medicine  PN    [facility]   Admitting Physician: Dr. Connor  Consultants: Dr. Harrison      CHIEF COMPLAINT:  fall    HISTORY OF PRESENT ILLNESS:    Patient presents to ER due to falling at home twice today; states he felt lightheaded/dizzy and felt ringing in his ears, he is unsure if he lost consciousness the first fall. The second fall his daughter helped him lower himself to the ground. When he initially arrived to ER he threw up blood and had bloody diarrhea (first occurrence-no history of GI bleed, has history of anemia, on xarelto for AFIB, denies ASA/ETOH/NSAID use, takes tylenol for pain) he also has some shortness of breath with exertion. He denies new allergies. Unsure of medications-[facility]. His daughter Gerda and her son are present at cart-side, all questions answered at this time. Patient does not use nicotine, alcohol, marijuana, or illicit drugs. Lives at home with Gerda, independent with ADLs uses cane and sometimes rollator, no home health services. He is agreeable to admission for evaluation of hematemesis.    3/7/2025  Patient seen examined on monitored bed in ED hold.  Patient currently down nuclear medicine for GI bleeding scan.  Patient still has some abdominal discomfort and the lightheadedness is improved.  BUN/creatinine 33/2.1 with normal electrolytes.  Blood sugars range 240-246.  Liver enzymes are normal with a WBC of 10.9 hemoglobin 10.5.  INR is 1.4.  Heart rate is 89 with blood pressure 147/79 with O2 sat 96% on room air at rest.  Patient's daughter is at the bedside.  Patient overall feels better but still very weak.  Unfortunately patient had a CTA of the abdomen with IV contrast and a CTA of the chest.  These did not show any evidence of GI bleed or PE or acute pulmonary problem.  There is no evidence any intraluminal hematoma or aneurysm.  Patient serum creatinine was 2.3 on admission.    3/8/2025  Patient seen examined on PCU/Endo.   evidence of an acute infarct.  There is no evidence of hydrocephalus. ORBITS: The visualized portion of the orbits demonstrate no acute abnormality. SINUSES: The frontal, ethmoid and sphenoid sinus mucosal thickening. SOFT TISSUES/SKULL:  No acute abnormality of the visualized skull or soft tissues.     1. No acute intracranial abnormality. 2. Frontal, ethmoid and sphenoid sinus mucosal thickening.     Encounter Date: 03/06/25   EKG 12 Lead   Result Value    Ventricular Rate 106    Atrial Rate 102    QRS Duration 86    Q-T Interval 432    QTc Calculation (Bazett) 573    R Axis -21    T Axis 114    Narrative    Possible atrial fibrillation with PVCs but rule out sinus with 1st degree AV block  ST & T wave abnormality, consider lateral ischemia  Possible LVH  Prolonged QT  Abnormal ECG    Confirmed by Jorge L Melgar ([id number]) on 3/10/2025 11:35:45 AM         ASSESSMENT:  Hematemesis-secondary to large gastric polyp with ulceration  2 gastric polyps-1.5 cm pedunculated polyp posterior gastric wall, 4-5 cm polyp with small ulceration in the antrum associated with active bleed-EGD with biopsy on 3/8/2025  Acute versus CKD stage IV-baseline creatinine 2.1 December 2023  Lactic acidosis  Mildly elevated troponin  Leukocytosis-resolved  Acute anemia-secondary to GI blood loss-large gastric polyp with ulceration on Xarelto  Accelerated junctional rhythm   Atrial fibrillation on xarelto  COPD on albuterol sulfate, olodatrol/tiotropium   HFpEF on torsemide  History of pernicious anemia on cyanocobalamin   History of iron deficiency anemia on ferrous sulfate  Depression on duloxetine   Type II insulin dependent diabetes mellitus on glargine  Hypothyroidism on levothyroxine  Class II obesity BMI 35.26 kg/m2    PLAN:  Admit to monitored floor. Home medications will be reconciled. Lab work ordered for morning. Antiemetics, antipyretics, as needed pain medicine, electrolyte supplementation, hypoglycemia recovery medications have

## 2025-03-10 NOTE — PROGRESS NOTES
PROGRESS NOTE  By Pietro Ann M.D.    [facility]  Dr. Kristen Lowery M.D.,  Dr. Jose Dyson M.D.,   Dr. Nelson Armenta D.O.,  Dr. Jean-Pierre Harrison M.D.,  MCKAYLA LawrenceO.,          Brooke Vaughan  79 y.o.  male    SUBJECTIVE:  Denies abdominal pain.  Denies any bleeding overnight.  H&H with slight downward trend after initial transfusion.  Family (daughter) at bedside    OBJECTIVE:    BP (!) 184/78   Pulse 90   Temp 97.5 °F (36.4 °C) (Infrared)   Resp 15   Ht 1.73 m (5' 8.11\")   Wt 117.9 kg (260 lb)   SpO2 99%   BMI 39.41 kg/m²     General: NAD/older adult obese  male  HEENT: Anicteric sclera/moist oral mucosa  Neck: Supple with trachea midline  Chest: CTAB  Cor: Irregular  Abd.: Soft and obese.  Nontender  Extr.:  Mild bilateral lower extremity edema.  Decreased muscle tone and bulk throughout  Skin: Warm and dry/anicteric      DATA:    Monitor data reviewed -atrial fibrillation noted.    Stool (measured) : 1 mL  Lab Results   Component Value Date/Time    WBC 7.0 03/10/2025 05:13 AM    RBC 2.27 03/10/2025 05:13 AM    HGB 7.6 03/10/2025 05:13 AM    HCT 23.4 03/10/2025 05:13 AM    .1 03/10/2025 05:13 AM    MCH 33.5 03/10/2025 05:13 AM    MCHC 32.5 03/10/2025 05:13 AM    RDW 16.6 03/10/2025 05:13 AM    PLT 77 03/10/2025 05:13 AM    MPV 9.4 03/10/2025 05:13 AM     Lab Results   Component Value Date/Time     03/10/2025 05:13 AM    K 3.8 03/10/2025 05:13 AM    K 4.5 05/07/2019 11:05 AM     03/10/2025 05:13 AM    CO2 25 03/10/2025 05:13 AM    BUN 22 03/10/2025 05:13 AM    CREATININE 2.0 03/10/2025 05:13 AM    CALCIUM 8.1 03/10/2025 05:13 AM    BILITOT 0.5 03/10/2025 05:13 AM    ALKPHOS 49 03/10/2025 05:13 AM    AST 25 03/10/2025 05:13 AM    ALT 14 03/10/2025 05:13 AM     Lab Results   Component Value Date/Time    LIPASE 6 03/06/2025 07:56 PM     No results found for: \"AMYLASE\"      ASSESSMENT/PLAN:  Patient Active Problem List   Diagnosis    GERD (gastroesophageal

## 2025-03-10 NOTE — PLAN OF CARE
Problem: Safety - Adult  Goal: Free from fall injury  Outcome: Progressing  Flowsheets (Taken 3/10/2025 0836)  Free From Fall Injury:   Instruct family/caregiver on patient safety   Based on caregiver fall risk screen, instruct family/caregiver to ask for assistance with transferring infant if caregiver noted to have fall risk factors     Problem: Chronic Conditions and Co-morbidities  Goal: Patient's chronic conditions and co-morbidity symptoms are monitored and maintained or improved  Outcome: Progressing  Flowsheets (Taken 3/10/2025 0716)  Care Plan - Patient's Chronic Conditions and Co-Morbidity Symptoms are Monitored and Maintained or Improved:   Monitor and assess patient's chronic conditions and comorbid symptoms for stability, deterioration, or improvement   Collaborate with multidisciplinary team to address chronic and comorbid conditions and prevent exacerbation or deterioration     Problem: Discharge Planning  Goal: Discharge to home or other facility with appropriate resources  Outcome: Progressing  Flowsheets (Taken 3/10/2025 0716)  Discharge to home or other facility with appropriate resources:   Identify barriers to discharge with patient and caregiver   Arrange for needed discharge resources and transportation as appropriate   Identify discharge learning needs (meds, wound care, etc)     Problem: Skin/Tissue Integrity  Goal: Skin integrity remains intact  Description: 1.  Monitor for areas of redness and/or skin breakdown  2.  Assess vascular access sites hourly  3.  Every 4-6 hours minimum:  Change oxygen saturation probe site  4.  Every 4-6 hours:  If on nasal continuous positive airway pressure, respiratory therapy assess nares and determine need for appliance change or resting period  Outcome: Progressing  Flowsheets  Taken 3/10/2025 0836  Skin Integrity Remains Intact:   Monitor for areas of redness and/or skin breakdown   Assess vascular access sites hourly  Taken 3/10/2025 0716  Skin  Integrity Remains Intact:   Monitor for areas of redness and/or skin breakdown   Assess vascular access sites hourly

## 2025-03-11 LAB
ALBUMIN SERPL-MCNC: 3.5 G/DL (ref 3.5–5.2)
ALP SERPL-CCNC: 55 U/L (ref 40–129)
ALT SERPL-CCNC: 19 U/L (ref 0–40)
ANION GAP SERPL CALCULATED.3IONS-SCNC: 9 MMOL/L (ref 7–16)
AST SERPL-CCNC: 31 U/L (ref 0–39)
BASOPHILS # BLD: 0 K/UL (ref 0–0.2)
BASOPHILS NFR BLD: 0 % (ref 0–2)
BILIRUB SERPL-MCNC: 0.6 MG/DL (ref 0–1.2)
BUN SERPL-MCNC: 14 MG/DL (ref 6–23)
CALCIUM SERPL-MCNC: 8.5 MG/DL (ref 8.6–10.2)
CHLORIDE SERPL-SCNC: 104 MMOL/L (ref 98–107)
CO2 SERPL-SCNC: 26 MMOL/L (ref 22–29)
CREAT SERPL-MCNC: 1.7 MG/DL (ref 0.7–1.2)
EOSINOPHIL # BLD: 0.19 K/UL (ref 0.05–0.5)
EOSINOPHILS RELATIVE PERCENT: 4 % (ref 0–6)
ERYTHROCYTE [DISTWIDTH] IN BLOOD BY AUTOMATED COUNT: 16.9 % (ref 11.5–15)
GFR, ESTIMATED: 39 ML/MIN/1.73M2
GLUCOSE BLD-MCNC: 108 MG/DL (ref 74–99)
GLUCOSE BLD-MCNC: 215 MG/DL (ref 74–99)
GLUCOSE BLD-MCNC: 261 MG/DL (ref 74–99)
GLUCOSE BLD-MCNC: 262 MG/DL (ref 74–99)
GLUCOSE SERPL-MCNC: 94 MG/DL (ref 74–99)
HCT VFR BLD AUTO: 22.6 % (ref 37–54)
HGB BLD-MCNC: 7.7 G/DL (ref 12.5–16.5)
LYMPHOCYTES NFR BLD: 1.56 K/UL (ref 1.5–4)
LYMPHOCYTES RELATIVE PERCENT: 28 % (ref 20–42)
MCH RBC QN AUTO: 33.9 PG (ref 26–35)
MCHC RBC AUTO-ENTMCNC: 34.1 G/DL (ref 32–34.5)
MCV RBC AUTO: 99.6 FL (ref 80–99.9)
MONOCYTES NFR BLD: 0.15 K/UL (ref 0.1–0.95)
MONOCYTES NFR BLD: 3 % (ref 2–12)
NEUTROPHILS NFR BLD: 66 % (ref 43–80)
NEUTS SEG NFR BLD: 3.6 K/UL (ref 1.8–7.3)
PLATELET # BLD AUTO: 89 K/UL (ref 130–450)
PLATELET CONFIRMATION: NORMAL
PMV BLD AUTO: 9.3 FL (ref 7–12)
POTASSIUM SERPL-SCNC: 3.5 MMOL/L (ref 3.5–5)
PROT SERPL-MCNC: 5.3 G/DL (ref 6.4–8.3)
RBC # BLD AUTO: 2.27 M/UL (ref 3.8–5.8)
RBC # BLD: ABNORMAL 10*6/UL
SODIUM SERPL-SCNC: 139 MMOL/L (ref 132–146)
WBC OTHER # BLD: 5.5 K/UL (ref 4.5–11.5)

## 2025-03-11 PROCEDURE — 85025 COMPLETE CBC W/AUTO DIFF WBC: CPT

## 2025-03-11 PROCEDURE — 36415 COLL VENOUS BLD VENIPUNCTURE: CPT

## 2025-03-11 PROCEDURE — 82962 GLUCOSE BLOOD TEST: CPT

## 2025-03-11 PROCEDURE — 2060000000 HC ICU INTERMEDIATE R&B

## 2025-03-11 PROCEDURE — 2580000003 HC RX 258: Performed by: HOSPITALIST

## 2025-03-11 PROCEDURE — 2580000003 HC RX 258: Performed by: INTERNAL MEDICINE

## 2025-03-11 PROCEDURE — 6370000000 HC RX 637 (ALT 250 FOR IP)

## 2025-03-11 PROCEDURE — 6360000002 HC RX W HCPCS: Performed by: HOSPITALIST

## 2025-03-11 PROCEDURE — 80053 COMPREHEN METABOLIC PANEL: CPT

## 2025-03-11 RX ADMIN — FERROUS SULFATE TAB 325 MG (65 MG ELEMENTAL FE) 325 MG: 325 (65 FE) TAB at 08:08

## 2025-03-11 RX ADMIN — DULOXETINE HYDROCHLORIDE 30 MG: 30 CAPSULE, DELAYED RELEASE ORAL at 20:42

## 2025-03-11 RX ADMIN — CYANOCOBALAMIN TAB 1000 MCG 500 MCG: 1000 TAB at 08:08

## 2025-03-11 RX ADMIN — SODIUM CHLORIDE, PRESERVATIVE FREE 40 MG: 5 INJECTION INTRAVENOUS at 11:39

## 2025-03-11 RX ADMIN — INSULIN LISPRO 4 UNITS: 100 INJECTION, SOLUTION INTRAVENOUS; SUBCUTANEOUS at 11:38

## 2025-03-11 RX ADMIN — TORSEMIDE 20 MG: 20 TABLET ORAL at 08:08

## 2025-03-11 RX ADMIN — SODIUM CHLORIDE, PRESERVATIVE FREE 40 MG: 5 INJECTION INTRAVENOUS at 00:24

## 2025-03-11 RX ADMIN — SODIUM CHLORIDE, PRESERVATIVE FREE 40 MG: 5 INJECTION INTRAVENOUS at 06:01

## 2025-03-11 RX ADMIN — INSULIN LISPRO 4 UNITS: 100 INJECTION, SOLUTION INTRAVENOUS; SUBCUTANEOUS at 20:42

## 2025-03-11 RX ADMIN — LEVOTHYROXINE SODIUM 50 MCG: 0.05 TABLET ORAL at 06:01

## 2025-03-11 RX ADMIN — SODIUM CHLORIDE, PRESERVATIVE FREE 40 MG: 5 INJECTION INTRAVENOUS at 18:09

## 2025-03-11 RX ADMIN — INSULIN GLARGINE 28 UNITS: 100 INJECTION, SOLUTION SUBCUTANEOUS at 20:42

## 2025-03-11 RX ADMIN — SODIUM CHLORIDE: 9 INJECTION, SOLUTION INTRAVENOUS at 01:27

## 2025-03-11 RX ADMIN — INSULIN LISPRO 2 UNITS: 100 INJECTION, SOLUTION INTRAVENOUS; SUBCUTANEOUS at 17:35

## 2025-03-11 RX ADMIN — INSULIN GLARGINE 28 UNITS: 100 INJECTION, SOLUTION SUBCUTANEOUS at 08:09

## 2025-03-11 ASSESSMENT — ENCOUNTER SYMPTOMS
SHORTNESS OF BREATH: 0
WHEEZING: 0
NAUSEA: 0
ABDOMINAL DISTENTION: 0
BLOOD IN STOOL: 1
EYE PAIN: 0
ABDOMINAL PAIN: 0
EYE DISCHARGE: 0
BACK PAIN: 0
SINUS PRESSURE: 0
RECTAL PAIN: 0
COUGH: 0
EYE REDNESS: 0
DIARRHEA: 1
VOMITING: 0
SORE THROAT: 0

## 2025-03-11 ASSESSMENT — PAIN SCALES - GENERAL: PAINLEVEL_OUTOF10: 0

## 2025-03-11 NOTE — PROGRESS NOTES
Spiritual Health History and Assessment/Progress Note  [facility]  Mikal Ayala    (P) Initial Encounter,  ,  ,      Name: Brooke Vaughan MRN: [id number]    Age: 79 y.o.     Sex: male   Language: English   Catholic: Pentecostalism   Gastrointestinal hemorrhage with hematemesis     Date: 3/11/2025                           Spiritual Assessment began in [facility] PIC/ICU        Referral/Consult From: (P) Rounding   Encounter Overview/Reason: (P) Initial Encounter  Service Provided For: (P) Patient    Tracy, Belief, Meaning:   Patient is connected with a tracy tradition or spiritual practice  Family/Friends No family/friends present      Importance and Influence:  Patient has spiritual/personal beliefs that influence decisions regarding their health  Family/Friends No family/friends present    Community:  Patient feels well-supported. Support system includes: Extended family  Family/Friends No family/friends present    Assessment and Plan of Care:     Patient Interventions include: Facilitated expression of thoughts and feelings and Affirmed coping skills/support systems  Family/Friends Interventions include: No family/friends present    Patient Plan of Care: Spiritual Care available upon further referral  Family/Friends Plan of Care: No family/friends present    Electronically signed by Chaplain Tosha on 3/11/2025 at 3:41 PM

## 2025-03-11 NOTE — PLAN OF CARE
Problem: Safety - Adult  Goal: Free from fall injury  3/11/2025 0927 by Eliot Cox RN  Outcome: Progressing  Flowsheets (Taken 3/11/2025 0927)  Free From Fall Injury:   Instruct family/caregiver on patient safety   Based on caregiver fall risk screen, instruct family/caregiver to ask for assistance with transferring infant if caregiver noted to have fall risk factors  3/11/2025 0650 by Karlo Martin RN  Outcome: Progressing  Flowsheets (Taken 3/10/2025 2000)  Free From Fall Injury: Instruct family/caregiver on patient safety     Problem: Chronic Conditions and Co-morbidities  Goal: Patient's chronic conditions and co-morbidity symptoms are monitored and maintained or improved  3/11/2025 0927 by Eliot Cox RN  Outcome: Progressing  Flowsheets (Taken 3/11/2025 0800)  Care Plan - Patient's Chronic Conditions and Co-Morbidity Symptoms are Monitored and Maintained or Improved:   Monitor and assess patient's chronic conditions and comorbid symptoms for stability, deterioration, or improvement   Collaborate with multidisciplinary team to address chronic and comorbid conditions and prevent exacerbation or deterioration  3/11/2025 0650 by Karlo Martin RN  Outcome: Progressing  Flowsheets (Taken 3/10/2025 2013)  Care Plan - Patient's Chronic Conditions and Co-Morbidity Symptoms are Monitored and Maintained or Improved: Monitor and assess patient's chronic conditions and comorbid symptoms for stability, deterioration, or improvement     Problem: Discharge Planning  Goal: Discharge to home or other facility with appropriate resources  3/11/2025 0927 by Eliot Cox RN  Outcome: Progressing  Flowsheets (Taken 3/11/2025 0800)  Discharge to home or other facility with appropriate resources:   Identify barriers to discharge with patient and caregiver   Arrange for needed discharge resources and transportation as appropriate   Identify discharge learning needs (meds, wound care, etc)  3/11/2025 0650 by  Karlo Martin, RN  Outcome: Progressing  Flowsheets (Taken 3/10/2025 2013)  Discharge to home or other facility with appropriate resources: Identify barriers to discharge with patient and caregiver     Problem: Skin/Tissue Integrity  Goal: Skin integrity remains intact  Description: 1.  Monitor for areas of redness and/or skin breakdown  2.  Assess vascular access sites hourly  3.  Every 4-6 hours minimum:  Change oxygen saturation probe site  4.  Every 4-6 hours:  If on nasal continuous positive airway pressure, respiratory therapy assess nares and determine need for appliance change or resting period  3/11/2025 0927 by Eliot Cox, RN  Outcome: Progressing  Flowsheets  Taken 3/11/2025 0927  Skin Integrity Remains Intact:   Monitor for areas of redness and/or skin breakdown   Assess vascular access sites hourly  Taken 3/11/2025 0800  Skin Integrity Remains Intact:   Monitor for areas of redness and/or skin breakdown   Assess vascular access sites hourly  3/11/2025 0650 by Karlo Martin, RN  Outcome: Progressing  Flowsheets  Taken 3/10/2025 2013  Skin Integrity Remains Intact: Monitor for areas of redness and/or skin breakdown  Taken 3/10/2025 2000  Skin Integrity Remains Intact: Monitor for areas of redness and/or skin breakdown     Problem: ABCDS Injury Assessment  Goal: Absence of physical injury  3/11/2025 0927 by Eliot Cox, RN  Outcome: Progressing  Flowsheets (Taken 3/11/2025 0927)  Absence of Physical Injury: Implement safety measures based on patient assessment  3/11/2025 0650 by Karlo Martin, RN  Outcome: Progressing  Flowsheets (Taken 3/10/2025 2000)  Absence of Physical Injury: Implement safety measures based on patient assessment

## 2025-03-11 NOTE — PROGRESS NOTES
PROGRESS NOTE  By Pietro Ann M.D.    [facility]  Dr. Kristen Lowery M.D.,  Dr. Jose Dyson M.D.,   Dr. Nelson Armenta D.O.,  Dr. Jean-Pierre Harrison M.D.,  MCKAYLA LawrenceO.,          Brooke Vaughan  79 y.o.  male    SUBJECTIVE:  Denies abdominal pain.  Denies nausea vomiting.  Reports bowel movement without blood or black stool    OBJECTIVE:    BP (!) 156/72   Pulse 92   Temp 98.4 °F (36.9 °C) (Oral)   Resp 18   Ht 1.73 m (5' 8.11\")   Wt 117.9 kg (260 lb)   SpO2 100%   BMI 39.41 kg/m²     General: NAD/obese older adult  male  HEENT: Anicteric sclera/moist oral mucosa  Neck: Supple with trachea midline  Chest: Symmetric excursion/CTAB  Cor: Regular  Abd.: Soft and obese.  Nontender.  BS +  Extr.:  Mild lower extremity edema.  Decreased muscle tone and bulk throughout  Skin: Warm and dry/anicteric        DATA:    Monitor data reviewed -sinus rhythm noted.    Stool (measured) : 1 mL  Lab Results   Component Value Date/Time    WBC 5.5 03/11/2025 06:41 AM    RBC 2.27 03/11/2025 06:41 AM    HGB 7.7 03/11/2025 06:41 AM    HCT 22.6 03/11/2025 06:41 AM    MCV 99.6 03/11/2025 06:41 AM    MCH 33.9 03/11/2025 06:41 AM    MCHC 34.1 03/11/2025 06:41 AM    RDW 16.9 03/11/2025 06:41 AM    PLT 89 03/11/2025 06:41 AM    MPV 9.3 03/11/2025 06:41 AM     Lab Results   Component Value Date/Time     03/11/2025 06:41 AM    K 3.5 03/11/2025 06:41 AM    K 4.5 05/07/2019 11:05 AM     03/11/2025 06:41 AM    CO2 26 03/11/2025 06:41 AM    BUN 14 03/11/2025 06:41 AM    CREATININE 1.7 03/11/2025 06:41 AM    CALCIUM 8.5 03/11/2025 06:41 AM    BILITOT 0.6 03/11/2025 06:41 AM    ALKPHOS 55 03/11/2025 06:41 AM    AST 31 03/11/2025 06:41 AM    ALT 19 03/11/2025 06:41 AM     Lab Results   Component Value Date/Time    LIPASE 6 03/06/2025 07:56 PM     No results found for: \"AMYLASE\"      ASSESSMENT/PLAN:  Patient Active Problem List   Diagnosis    GERD (gastroesophageal reflux disease)    Diabetes mellitus  admitting/pertinent consultants     H&H appears stabilized without evidence of overt bleed.  Negative nuclear medicine bleeding scan 3/7/2025  Advance diet  Consider anticoagulation with reversible/short acting agent -however as previously stated it is likely for recurrent bleeding from above described large polyps  -should consider holding oral anticoagulation until this issue is addressed  -consider consultation with advanced gastroenterology (Dr. Rich/Dr. Leal)-defer to admitting.    Pietro Ann MD  3/11/2025  8:34 AM    NOTE:  This report was transcribed using voice recognition software.  Every effort was made to ensure accuracy; however, inadvertent computerized transcription errors may be present.

## 2025-03-11 NOTE — CONSULTS
GENERAL SURGERY  CONSULT NOTE  3/11/2025    Physician Consulted: Dr. Randolph   Reason for Consult: Gastric polyp   Referring Physician: Dr. Nikolas OROZCO  Brooke Vaughan is a 79 y.o. male who presents to the general surgery service for evaluation of large gastric polyp. The patient has been admitted since 3/6/2025 for treatment of gastrointestinal hemorrhage with hematemesis and fall. Workup performed in the ED, and EGD performed on 3/8/25 revealing 1.5 cm distal polyp and 5 cm antral polyp. Today patient reports 4 to 5 bowel movements today with bloody dark stool, ranging from soft stool to diarrhea. Patient's hemoglobin dropped from 12.4 on arrival to 7.7 and platelets are 89.    Medical history is significant for A-fib, Type 2 DM, HTN . Abdominal surgical history is significant for cholecystectomy, EGD and colonoscopy [facility]. The patient is taking Xarelto 15 mg daily blood thinning medications.    Patient  reports that he quit smoking about 30 years ago. His smoking use included cigarettes. He started smoking about 55 years ago. He has a 12.5 pack-year smoking history. He has never used smokeless tobacco. He reports that he does not drink alcohol and does not use drugs.    Since onset the patient has been stable    Family history       Past Medical History:   Diagnosis Date    Arthritis     Atrial fibrillation (HCC)     Diabetes mellitus type 2, noninsulin dependent (HCC)     Dyspnea     H/O cardiovascular stress test 07/24/2023    Lexiscan    History of cardiovascular stress test 10/05/2018    Lexiscan stress test    History of echocardiogram 12/28/2017    EF  55%    Hyperlipidemia     Hypertension     Preoperative clearance     on chart for surgery with KRISHAN SAMSON Guille 12/2015       Past Surgical History:   Procedure Laterality Date    CARDIAC CATHETERIZATION  04/26/2012    CARDIAC CATHETERIZATION  10/12/2018    Dr. Soler    CARDIOVASCULAR STRESS TEST  08/11    CHOLECYSTECTOMY      DIAGNOSTIC CARDIAC CATH LAB      Electronically signed by RICK Randall CNP on 3/11/25 at 2:35 PM EDT

## 2025-03-11 NOTE — PLAN OF CARE
Problem: Safety - Adult  Goal: Free from fall injury  Outcome: Progressing  Flowsheets (Taken 3/10/2025 2000)  Free From Fall Injury: Instruct family/caregiver on patient safety     Problem: Pain  Goal: Verbalizes/displays adequate comfort level or baseline comfort level  Outcome: Progressing     Problem: Chronic Conditions and Co-morbidities  Goal: Patient's chronic conditions and co-morbidity symptoms are monitored and maintained or improved  Outcome: Progressing  Flowsheets (Taken 3/10/2025 2013)  Care Plan - Patient's Chronic Conditions and Co-Morbidity Symptoms are Monitored and Maintained or Improved: Monitor and assess patient's chronic conditions and comorbid symptoms for stability, deterioration, or improvement

## 2025-03-11 NOTE — CARE COORDINATION
3/11/25  Note: Pt admitted for Gastrointestinal hemorrhage w/ Hematemesis. Per IDT pt on IV protonix; transfusions as ordered. Anticoagulant on hold. H&H ordered q8hrs. Room air. Per review GI needs to initiate transfer to [facility] if being recommended. Followed up with pt @ bedside. Pt lives with Gerda/Dtr. Pt Veter[facility] & [facility] from [facility]. Dr. Betty Rodríguez is [facility] p[facility].  Pt receives meds through [facility] other than vitamin supplements. Pt on xarelto pta & dtr reported all of pts meds are covered 100%. Pt has nebulizer & cpap through the [facility]. Pt also has rollator, which is baseline for ambulation. Pt also has a cane, wc, & transfer bench. Dtr pts primary support & declined anticipation of any needs upon d/c. SW following for discharge status/potential needs. Electronically signed by MARLEE Hugo on 3/11/2025 at 4:44 PM

## 2025-03-11 NOTE — PROGRESS NOTES
Department of Internal Medicine  PN    [facility]P: [facility]   Admitting Physician: Dr. Connor  Consultants: Dr. Harrison      CHIEF COMPLAINT:  fall    HISTORY OF PRESENT ILLNESS:    Patient presents to ER due to falling at home twice today; states he felt lightheaded/dizzy and felt ringing in his ears, he is unsure if he lost consciousness the first fall. The second fall his daughter helped him lower himself to the ground. When he initially arrived to ER he threw up blood and had bloody diarrhea (first occurrence-no history of GI bleed, has history of anemia, on xarelto for AFIB, denies ASA/ETOH/NSAID use, takes tylenol for pain) he also has some shortness of breath with exertion. He denies new allergies. Unsure of medications-[facility] with [facility]. His daughter Gerda and her son are present at cart-side, all questions answered at this time. Patient does not use nicotine, alcohol, marijuana, or illicit drugs. Lives at home with Gerda, independent with ADLs uses cane and sometimes rollator, no home health services. He is agreeable to admission for evaluation of hematemesis.    3/7/2025  Patient seen examined on monitored bed in ED hold.  Patient currently down nuclear medicine for GI bleeding scan.  Patient still has some abdominal discomfort and the lightheadedness is improved.  BUN/creatinine 33/2.1 with normal electrolytes.  Blood sugars range 240-246.  Liver enzymes are normal with a WBC of 10.9 hemoglobin 10.5.  INR is 1.4.  Heart rate is 89 with blood pressure 147/79 with O2 sat 96% on room air at rest.  Patient's daughter is at the bedside.  Patient overall feels better but still very weak.  Unfortunately patient had a CTA of the abdomen with IV contrast and a CTA of the chest.  These did not show any evidence of GI bleed or PE or acute pulmonary problem.  There is no evidence any intraluminal hematoma or aneurysm.  Patient serum creatinine was 2.3 on admission.    3/8/2025  Patient seen examined on PCU/Endo.   provided for review. Automated exposure control, iterative reconstruction, and/or weight based adjustment of the mA/kV was utilized to reduce the radiation dose to as low as reasonably achievable. COMPARISON: None. HISTORY: ORDERING SYSTEM PROVIDED HISTORY: gi bleed TECHNOLOGIST PROVIDED HISTORY: Reason for exam:->gi bleed Additional Contrast?->None FINDINGS: 1.  CTA AORTA: THORACIC/ABDOMINAL/ILIAC VESSELS: Precontrast demonstrate no evidence for acute intramural hematoma in the wall of the thoracic aorta/abdominal aorta/iliac arteries. After IV contrast administration there is no indication for a aneurysm formation or dissection thoracic aorta/abdominal aorta/iliac arteries.  There is good contrast runoff into visualized proximal right left superficial and profunda femoral arteries. Great vessels appear unremarkable. There are good contrast enhancement for the celiac axis SMA, right and left renal arteries, and JESSE without indication for stenosis. In the precontrast study there are hyperdense contents in the stomach which more likely is relate with un digested medicine pill. After IV contrast administration, during the arterial phase cannot see conspicuously a point of bleeding throughout the thoracic esophagus, stomach not accounted the Jennifer contrast hyperdensity, duodenum, small bowel segments and for the entire colon from the cecum through the rectum.  There is a redundant the sigmoid colon which is anatomic variation. No indication for pulling of contrast seen in the portal venous phase. 2.  CTA CHEST: The contrast density was target to evaluate the thoracic aorta and not the pulmonary artery circulation.  The contrast is limited to evaluate more distal segmental branches of the pulmonary artery circulation bilateral but there is no conspicuous acute central pulmonary embolus in the main PA, right and left main PAs, in the lobar, segmental branches more proximally. The heart is normal size.  There is a

## 2025-03-12 ENCOUNTER — RESULTS FOLLOW-UP (OUTPATIENT)
Dept: EMERGENCY DEPT | Age: 80
End: 2025-03-12

## 2025-03-12 LAB
ALBUMIN SERPL-MCNC: 3.6 G/DL (ref 3.5–5.2)
ALP SERPL-CCNC: 63 U/L (ref 40–129)
ALT SERPL-CCNC: 21 U/L (ref 0–40)
ANION GAP SERPL CALCULATED.3IONS-SCNC: 10 MMOL/L (ref 7–16)
AST SERPL-CCNC: 29 U/L (ref 0–39)
BASOPHILS # BLD: 0 K/UL (ref 0–0.2)
BASOPHILS NFR BLD: 0 % (ref 0–2)
BILIRUB SERPL-MCNC: 0.7 MG/DL (ref 0–1.2)
BUN SERPL-MCNC: 12 MG/DL (ref 6–23)
CALCIUM SERPL-MCNC: 8.8 MG/DL (ref 8.6–10.2)
CHLORIDE SERPL-SCNC: 104 MMOL/L (ref 98–107)
CO2 SERPL-SCNC: 27 MMOL/L (ref 22–29)
CREAT SERPL-MCNC: 1.7 MG/DL (ref 0.7–1.2)
EOSINOPHIL # BLD: 0.16 K/UL (ref 0.05–0.5)
EOSINOPHILS RELATIVE PERCENT: 3 % (ref 0–6)
ERYTHROCYTE [DISTWIDTH] IN BLOOD BY AUTOMATED COUNT: 16.9 % (ref 11.5–15)
GFR, ESTIMATED: 40 ML/MIN/1.73M2
GLUCOSE BLD-MCNC: 235 MG/DL (ref 74–99)
GLUCOSE BLD-MCNC: 255 MG/DL (ref 74–99)
GLUCOSE BLD-MCNC: 291 MG/DL (ref 74–99)
GLUCOSE BLD-MCNC: 92 MG/DL (ref 74–99)
GLUCOSE SERPL-MCNC: 69 MG/DL (ref 74–99)
HCT VFR BLD AUTO: 25.8 % (ref 37–54)
HGB BLD-MCNC: 8.4 G/DL (ref 12.5–16.5)
LYMPHOCYTES NFR BLD: 1.64 K/UL (ref 1.5–4)
LYMPHOCYTES RELATIVE PERCENT: 27 % (ref 20–42)
MCH RBC QN AUTO: 33.1 PG (ref 26–35)
MCHC RBC AUTO-ENTMCNC: 32.6 G/DL (ref 32–34.5)
MCV RBC AUTO: 101.6 FL (ref 80–99.9)
MONOCYTES NFR BLD: 0.16 K/UL (ref 0.1–0.95)
MONOCYTES NFR BLD: 3 % (ref 2–12)
NEUTROPHILS NFR BLD: 68 % (ref 43–80)
NEUTS SEG NFR BLD: 4.14 K/UL (ref 1.8–7.3)
NUCLEATED RED BLOOD CELLS: 2 PER 100 WBC
PLATELET # BLD AUTO: 98 K/UL (ref 130–450)
PLATELET CONFIRMATION: NORMAL
PMV BLD AUTO: 9.3 FL (ref 7–12)
POTASSIUM SERPL-SCNC: 3.7 MMOL/L (ref 3.5–5)
PROT SERPL-MCNC: 5.6 G/DL (ref 6.4–8.3)
RBC # BLD AUTO: 2.54 M/UL (ref 3.8–5.8)
RBC # BLD: ABNORMAL 10*6/UL
SODIUM SERPL-SCNC: 141 MMOL/L (ref 132–146)
WBC OTHER # BLD: 6.1 K/UL (ref 4.5–11.5)

## 2025-03-12 PROCEDURE — 6360000002 HC RX W HCPCS: Performed by: HOSPITALIST

## 2025-03-12 PROCEDURE — 85025 COMPLETE CBC W/AUTO DIFF WBC: CPT

## 2025-03-12 PROCEDURE — 2580000003 HC RX 258: Performed by: HOSPITALIST

## 2025-03-12 PROCEDURE — 6370000000 HC RX 637 (ALT 250 FOR IP)

## 2025-03-12 PROCEDURE — 80053 COMPREHEN METABOLIC PANEL: CPT

## 2025-03-12 PROCEDURE — 82962 GLUCOSE BLOOD TEST: CPT

## 2025-03-12 PROCEDURE — 2060000000 HC ICU INTERMEDIATE R&B

## 2025-03-12 PROCEDURE — 36415 COLL VENOUS BLD VENIPUNCTURE: CPT

## 2025-03-12 RX ORDER — PROCHLORPERAZINE EDISYLATE 5 MG/ML
5 INJECTION INTRAMUSCULAR; INTRAVENOUS EVERY 6 HOURS PRN
Status: DISCONTINUED | OUTPATIENT
Start: 2025-03-12 | End: 2025-03-15 | Stop reason: HOSPADM

## 2025-03-12 RX ADMIN — INSULIN LISPRO 4 UNITS: 100 INJECTION, SOLUTION INTRAVENOUS; SUBCUTANEOUS at 22:18

## 2025-03-12 RX ADMIN — INSULIN LISPRO 2 UNITS: 100 INJECTION, SOLUTION INTRAVENOUS; SUBCUTANEOUS at 17:11

## 2025-03-12 RX ADMIN — DULOXETINE HYDROCHLORIDE 30 MG: 30 CAPSULE, DELAYED RELEASE ORAL at 22:17

## 2025-03-12 RX ADMIN — LEVOTHYROXINE SODIUM 50 MCG: 0.05 TABLET ORAL at 05:30

## 2025-03-12 RX ADMIN — SODIUM CHLORIDE, PRESERVATIVE FREE 40 MG: 5 INJECTION INTRAVENOUS at 02:05

## 2025-03-12 RX ADMIN — FERROUS SULFATE TAB 325 MG (65 MG ELEMENTAL FE) 325 MG: 325 (65 FE) TAB at 08:29

## 2025-03-12 RX ADMIN — SODIUM CHLORIDE, PRESERVATIVE FREE 40 MG: 5 INJECTION INTRAVENOUS at 17:11

## 2025-03-12 RX ADMIN — TORSEMIDE 20 MG: 20 TABLET ORAL at 08:29

## 2025-03-12 RX ADMIN — SODIUM CHLORIDE, PRESERVATIVE FREE 40 MG: 5 INJECTION INTRAVENOUS at 05:26

## 2025-03-12 RX ADMIN — INSULIN GLARGINE 28 UNITS: 100 INJECTION, SOLUTION SUBCUTANEOUS at 22:18

## 2025-03-12 RX ADMIN — SODIUM CHLORIDE, PRESERVATIVE FREE 40 MG: 5 INJECTION INTRAVENOUS at 12:07

## 2025-03-12 RX ADMIN — INSULIN LISPRO 4 UNITS: 100 INJECTION, SOLUTION INTRAVENOUS; SUBCUTANEOUS at 12:06

## 2025-03-12 NOTE — CARE COORDINATION
3/12/25  Note: Pt on IV protonix & fluids; transfusions as ordered. Followed up with pt & dtr @ bedside. Dtr reported that phys is awaiting biopsy results for further decisions on intervention needed. I has been considered to have pt transferred to [place]. Current discharge plan is for pt to return home, his dtr resides with him. [facility] & [facility] Betty Rodríguez is [facility].  [facility] other than [facility] supplements. Pt on xarelto pta & dtr reported all of pts meds are covered 100%. Pt [facility] & [facility]ap [facility]. Pt also has rollator, which is baseline for ambulation. Pt also has a cane, wc, & transfer bench. Dtr pts primary support. No current d/c needs determined, will monitor. Electronically signed by MARLEE Hugo on 3/12/2025 at 2:25 PM

## 2025-03-12 NOTE — PROGRESS NOTES
PROGRESS NOTE  By Pietro Ann M.D.    [facility]  Dr. Kristen Lowery M.D.,  Dr. Jose Dyson M.D.,   Dr. Nelson Armenta D.O.,  Dr. Jean-Pierre Harrison M.D.,  MCKAYLA LawrenceO.,          Brooke Vaughan  79 y.o.  male    SUBJECTIVE:  No new complaints.  Denies any significant abdominal pain.  Denies nausea vomiting.  No family at bedside    OBJECTIVE:    BP (!) 156/74   Pulse 82   Temp 97.9 °F (36.6 °C) (Oral)   Resp 20   Ht 1.73 m (5' 8.11\")   Wt 117.9 kg (260 lb)   SpO2 96%   BMI 39.41 kg/m²     General: NAD/adult  male.  Obese.  HEENT: Anicteric sclera/moist oral mucosa  Neck: Supple with trachea midline  Chest: CTAB/symmetric excursions  Cor: Regular  Abd.: Soft and obese.  Nontender  Extr.:  No significant peripheral edema.  Decreased muscle tone and bulk throughout  Skin: Warm and dry/anicteric      DATA:    Monitor data reviewed -sinus rhythm noted.    Stool (measured) : 1 mL  Lab Results   Component Value Date/Time    WBC 6.1 03/12/2025 06:33 AM    RBC 2.54 03/12/2025 06:33 AM    HGB 8.4 03/12/2025 06:33 AM    HCT 25.8 03/12/2025 06:33 AM    .6 03/12/2025 06:33 AM    MCH 33.1 03/12/2025 06:33 AM    MCHC 32.6 03/12/2025 06:33 AM    RDW 16.9 03/12/2025 06:33 AM    PLT 98 03/12/2025 06:33 AM    MPV 9.3 03/12/2025 06:33 AM     Lab Results   Component Value Date/Time     03/11/2025 06:41 AM    K 3.5 03/11/2025 06:41 AM    K 4.5 05/07/2019 11:05 AM     03/11/2025 06:41 AM    CO2 26 03/11/2025 06:41 AM    BUN 14 03/11/2025 06:41 AM    CREATININE 1.7 03/11/2025 06:41 AM    CALCIUM 8.5 03/11/2025 06:41 AM    BILITOT 0.6 03/11/2025 06:41 AM    ALKPHOS 55 03/11/2025 06:41 AM    AST 31 03/11/2025 06:41 AM    ALT 19 03/11/2025 06:41 AM     Lab Results   Component Value Date/Time    LIPASE 6 03/06/2025 07:56 PM     No results found for: \"AMYLASE\"      ASSESSMENT/PLAN:  Patient Active Problem List   Diagnosis    GERD (gastroesophageal reflux disease)    Diabetes mellitus  admitting/pertinent consultants     H&H appears stabilized without evidence of overt bleed.  Negative nuclear medicine bleeding scan 3/7/2025  Advanced diet tolerated  Consider anticoagulation with reversible/short acting agent -however as previously stated it is likely for recurrent bleeding from above described large polyps  -should consider holding oral anticoagulation until this issue is addressed  -General Surgery input appreciated.      Pietro Ann MD  3/12/2025  7:36 AM    NOTE:  This report was transcribed using voice recognition software.  Every effort was made to ensure accuracy; however, inadvertent computerized transcription errors may be present.

## 2025-03-12 NOTE — PROGRESS NOTES
General SURGERY  DAILY PROGRESS NOTE  3/12/2025    CHIEF COMPLAINT:  Chief Complaint   Patient presents with    Hematemesis     EMS states that they were called for fall. Pt started to vomit blood upon arrival        SUBJECTIVE:  Feeling well this morning.  No dark or bloody bowel movements overnight, he says they are more brown now.  Denies any nausea, vomiting or hematemesis.    OBJECTIVE:  BP (!) 156/74   Pulse 82   Temp 97.9 °F (36.6 °C) (Oral)   Resp 20   Ht 1.73 m (5' 8.11\")   Wt 117.9 kg (260 lb)   SpO2 96%   BMI 39.41 kg/m²     GENERAL:  NAD. A&Ox3.  HEENT: Normocephalic  LUNGS: On room air. No acute respiratory distress  CARDIOVASCULAR: hemodynamically stable  SKIN: Warm and dry  ABDOMEN:  Soft, non-distended, nontender. No guarding, rigidity, rebound.  EXT: ROM intact all 4 extremities, no obvious deformities    CBC  Recent Labs     03/10/25  0513 03/11/25  0641   WBC 7.0 5.5   HGB 7.6* 7.7*   HCT 23.4* 22.6*   .1* 99.6   PLT 77* 89*   MPV 9.4 9.3       CMP  Recent Labs     03/10/25  0513 03/11/25  0641    139   K 3.8 3.5    104   CO2 25 26   BUN 22 14   CREATININE 2.0* 1.7*   GLUCOSE 85 94   CALCIUM 8.1* 8.5*   BILITOT 0.5 0.6   ALKPHOS 49 55   AST 25 31   ALT 14 19         ASSESSMENT/PLAN:  79 y.o. male with ulcerated, bleeding gastric mass    Plan  -Awaiting pathology still from EGD pathology will guide our decision making  -Will likely need resection regardless of path given it is causing him bleeding  -Transfuse for hemoglobin less than 7 or if patient becomes unstable  -Continue to hold anticoagulation  -Monitor abdominal exam    Mikal Prado MD  Surgery Resident PGY-4  3/12/2025  6:26 AM       As above. I saw and examined the patient and agree with the resident's assessment and plan.   Admits to some left sided dull abdominal pain this morning. Bowel movements have not been bloody and hemoglobin is stable. We are still awaiting final pathology fron 3/8, the case has been

## 2025-03-12 NOTE — PLAN OF CARE
Problem: Safety - Adult  Goal: Free from fall injury  3/12/2025 1756 by Sergey Lott RN  Outcome: Progressing  3/12/2025 0612 by Yury Niño RN  Outcome: Progressing     Problem: Chronic Conditions and Co-morbidities  Goal: Patient's chronic conditions and co-morbidity symptoms are monitored and maintained or improved  3/12/2025 1756 by Sergey Lott RN  Outcome: Progressing  Flowsheets (Taken 3/12/2025 0725)  Care Plan - Patient's Chronic Conditions and Co-Morbidity Symptoms are Monitored and Maintained or Improved: Monitor and assess patient's chronic conditions and comorbid symptoms for stability, deterioration, or improvement  3/12/2025 0612 by Yury Niño RN  Outcome: Progressing     Problem: Discharge Planning  Goal: Discharge to home or other facility with appropriate resources  3/12/2025 1756 by Sergey Lott RN  Outcome: Progressing  Flowsheets (Taken 3/12/2025 0725)  Discharge to home or other facility with appropriate resources: Identify barriers to discharge with patient and caregiver  3/12/2025 0612 by Yury Niño RN  Outcome: Progressing     Problem: Pain  Goal: Verbalizes/displays adequate comfort level or baseline comfort level  3/12/2025 1756 by Sergey Lott RN  Outcome: Progressing  3/12/2025 0612 by Yury Niño RN  Outcome: Progressing     Problem: Skin/Tissue Integrity  Goal: Skin integrity remains intact  Description: 1.  Monitor for areas of redness and/or skin breakdown  2.  Assess vascular access sites hourly  3.  Every 4-6 hours minimum:  Change oxygen saturation probe site  4.  Every 4-6 hours:  If on nasal continuous positive airway pressure, respiratory therapy assess nares and determine need for appliance change or resting period  3/12/2025 1756 by Sergey Lott RN  Outcome: Progressing  Flowsheets (Taken 3/12/2025 0725)  Skin Integrity Remains Intact: Monitor for areas of redness and/or skin breakdown  3/12/2025 0612 by Yury Niño  RN  Outcome: Progressing     Problem: ABCDS Injury Assessment  Goal: Absence of physical injury  3/12/2025 1756 by Sergey Lott RN  Outcome: Progressing  3/12/2025 0612 by Yury Niño, RN  Outcome: Progressing

## 2025-03-12 NOTE — PROGRESS NOTES
Department of Internal Medicine  PN    [facility]   Admitting Physician: Dr. Connor  Consultants: Dr. Harrison      CHIEF COMPLAINT:  fall    HISTORY OF PRESENT ILLNESS:    Patient presents to ER due to falling at home twice today; states he felt lightheaded/dizzy and felt ringing in his ears, he is unsure if he lost consciousness the first fall. The second fall his daughter helped him lower himself to the ground. When he initially arrived to ER he threw up blood and had bloody diarrhea (first occurrence-no history of GI bleed, has history of anemia, on xarelto for AFIB, denies ASA/ETOH/NSAID use, takes tylenol for pain) he also has some shortness of breath with exertion. He denies new allergies. Unsure of medications-[facility] with [facility]. His daughter Gerda and her son are present at cart-side, all questions answered at this time. Patient does not use nicotine, alcohol, marijuana, or illicit drugs. Lives at home with Gerda, independent with ADLs uses cane and sometimes rollator, no home health services. He is agreeable to admission for evaluation of hematemesis.    3/7/2025  Patient seen examined on monitored bed in ED hold.  Patient currently down nuclear medicine for GI bleeding scan.  Patient still has some abdominal discomfort and the lightheadedness is improved.  BUN/creatinine 33/2.1 with normal electrolytes.  Blood sugars range 240-246.  Liver enzymes are normal with a WBC of 10.9 hemoglobin 10.5.  INR is 1.4.  Heart rate is 89 with blood pressure 147/79 with O2 sat 96% on room air at rest.  Patient's daughter is at the bedside.  Patient overall feels better but still very weak.  Unfortunately patient had a CTA of the abdomen with IV contrast and a CTA of the chest.  These did not show any evidence of GI bleed or PE or acute pulmonary problem.  There is no evidence any intraluminal hematoma or aneurysm.  Patient serum creatinine was 2.3 on admission.    3/8/2025  Patient seen examined on PCU/Endo.

## 2025-03-13 LAB
ALBUMIN SERPL-MCNC: 3.4 G/DL (ref 3.5–5.2)
ALP SERPL-CCNC: 64 U/L (ref 40–129)
ALT SERPL-CCNC: 21 U/L (ref 0–40)
ANION GAP SERPL CALCULATED.3IONS-SCNC: 10 MMOL/L (ref 7–16)
AST SERPL-CCNC: 26 U/L (ref 0–39)
BASOPHILS # BLD: 0.04 K/UL (ref 0–0.2)
BASOPHILS NFR BLD: 1 % (ref 0–2)
BILIRUB SERPL-MCNC: 0.7 MG/DL (ref 0–1.2)
BUN SERPL-MCNC: 11 MG/DL (ref 6–23)
CALCIUM SERPL-MCNC: 8.7 MG/DL (ref 8.6–10.2)
CHLORIDE SERPL-SCNC: 101 MMOL/L (ref 98–107)
CO2 SERPL-SCNC: 28 MMOL/L (ref 22–29)
CREAT SERPL-MCNC: 1.8 MG/DL (ref 0.7–1.2)
EOSINOPHIL # BLD: 0.2 K/UL (ref 0.05–0.5)
EOSINOPHILS RELATIVE PERCENT: 4 % (ref 0–6)
ERYTHROCYTE [DISTWIDTH] IN BLOOD BY AUTOMATED COUNT: 16.7 % (ref 11.5–15)
GFR, ESTIMATED: 39 ML/MIN/1.73M2
GLUCOSE BLD-MCNC: 189 MG/DL (ref 74–99)
GLUCOSE BLD-MCNC: 192 MG/DL (ref 74–99)
GLUCOSE BLD-MCNC: 266 MG/DL (ref 74–99)
GLUCOSE BLD-MCNC: 271 MG/DL (ref 74–99)
GLUCOSE SERPL-MCNC: 188 MG/DL (ref 74–99)
HCT VFR BLD AUTO: 24.7 % (ref 37–54)
HGB BLD-MCNC: 8.3 G/DL (ref 12.5–16.5)
LYMPHOCYTES NFR BLD: 1.06 K/UL (ref 1.5–4)
LYMPHOCYTES RELATIVE PERCENT: 24 % (ref 20–42)
MCH RBC QN AUTO: 33.3 PG (ref 26–35)
MCHC RBC AUTO-ENTMCNC: 33.6 G/DL (ref 32–34.5)
MCV RBC AUTO: 99.2 FL (ref 80–99.9)
MONOCYTES NFR BLD: 0.04 K/UL (ref 0.1–0.95)
MONOCYTES NFR BLD: 1 % (ref 2–12)
NEUTROPHILS NFR BLD: 70 % (ref 43–80)
NEUTS SEG NFR BLD: 3.17 K/UL (ref 1.8–7.3)
PLATELET # BLD AUTO: 92 K/UL (ref 130–450)
PLATELET CONFIRMATION: NORMAL
PMV BLD AUTO: 9.6 FL (ref 7–12)
POTASSIUM SERPL-SCNC: 4.1 MMOL/L (ref 3.5–5)
PROT SERPL-MCNC: 5.4 G/DL (ref 6.4–8.3)
RBC # BLD AUTO: 2.49 M/UL (ref 3.8–5.8)
RBC # BLD: NORMAL 10*6/UL
SODIUM SERPL-SCNC: 139 MMOL/L (ref 132–146)
SURGICAL PATHOLOGY REPORT: NORMAL
WBC OTHER # BLD: 4.5 K/UL (ref 4.5–11.5)

## 2025-03-13 PROCEDURE — 2060000000 HC ICU INTERMEDIATE R&B

## 2025-03-13 PROCEDURE — 6370000000 HC RX 637 (ALT 250 FOR IP)

## 2025-03-13 PROCEDURE — 2580000003 HC RX 258: Performed by: INTERNAL MEDICINE

## 2025-03-13 PROCEDURE — 82962 GLUCOSE BLOOD TEST: CPT

## 2025-03-13 PROCEDURE — 2580000003 HC RX 258: Performed by: HOSPITALIST

## 2025-03-13 PROCEDURE — 6360000002 HC RX W HCPCS: Performed by: HOSPITALIST

## 2025-03-13 PROCEDURE — 85025 COMPLETE CBC W/AUTO DIFF WBC: CPT

## 2025-03-13 PROCEDURE — 36415 COLL VENOUS BLD VENIPUNCTURE: CPT

## 2025-03-13 PROCEDURE — 99232 SBSQ HOSP IP/OBS MODERATE 35: CPT | Performed by: STUDENT IN AN ORGANIZED HEALTH CARE EDUCATION/TRAINING PROGRAM

## 2025-03-13 PROCEDURE — 80053 COMPREHEN METABOLIC PANEL: CPT

## 2025-03-13 RX ADMIN — CYANOCOBALAMIN TAB 1000 MCG 500 MCG: 1000 TAB at 08:31

## 2025-03-13 RX ADMIN — SODIUM CHLORIDE, PRESERVATIVE FREE 40 MG: 5 INJECTION INTRAVENOUS at 16:52

## 2025-03-13 RX ADMIN — INSULIN LISPRO 4 UNITS: 100 INJECTION, SOLUTION INTRAVENOUS; SUBCUTANEOUS at 11:46

## 2025-03-13 RX ADMIN — SODIUM CHLORIDE, PRESERVATIVE FREE 40 MG: 5 INJECTION INTRAVENOUS at 23:16

## 2025-03-13 RX ADMIN — INSULIN LISPRO 4 UNITS: 100 INJECTION, SOLUTION INTRAVENOUS; SUBCUTANEOUS at 20:12

## 2025-03-13 RX ADMIN — SODIUM CHLORIDE, PRESERVATIVE FREE 40 MG: 5 INJECTION INTRAVENOUS at 06:13

## 2025-03-13 RX ADMIN — INSULIN LISPRO 2 UNITS: 100 INJECTION, SOLUTION INTRAVENOUS; SUBCUTANEOUS at 08:34

## 2025-03-13 RX ADMIN — SODIUM CHLORIDE: 9 INJECTION, SOLUTION INTRAVENOUS at 20:45

## 2025-03-13 RX ADMIN — LEVOTHYROXINE SODIUM 50 MCG: 0.05 TABLET ORAL at 06:13

## 2025-03-13 RX ADMIN — SODIUM CHLORIDE, PRESERVATIVE FREE 40 MG: 5 INJECTION INTRAVENOUS at 01:27

## 2025-03-13 RX ADMIN — INSULIN LISPRO 2 UNITS: 100 INJECTION, SOLUTION INTRAVENOUS; SUBCUTANEOUS at 16:52

## 2025-03-13 RX ADMIN — DULOXETINE HYDROCHLORIDE 30 MG: 30 CAPSULE, DELAYED RELEASE ORAL at 20:12

## 2025-03-13 RX ADMIN — INSULIN GLARGINE 28 UNITS: 100 INJECTION, SOLUTION SUBCUTANEOUS at 20:12

## 2025-03-13 RX ADMIN — FERROUS SULFATE TAB 325 MG (65 MG ELEMENTAL FE) 325 MG: 325 (65 FE) TAB at 08:33

## 2025-03-13 RX ADMIN — TORSEMIDE 20 MG: 20 TABLET ORAL at 08:31

## 2025-03-13 RX ADMIN — INSULIN GLARGINE 28 UNITS: 100 INJECTION, SOLUTION SUBCUTANEOUS at 08:36

## 2025-03-13 RX ADMIN — SODIUM CHLORIDE, PRESERVATIVE FREE 40 MG: 5 INJECTION INTRAVENOUS at 11:48

## 2025-03-13 NOTE — PLAN OF CARE
Problem: Safety - Adult  Goal: Free from fall injury  3/13/2025 1714 by Sergey Lott RN  Outcome: Progressing  3/13/2025 0648 by Yury Niño RN  Outcome: Progressing  Flowsheets (Taken 3/12/2025 2000)  Free From Fall Injury:   Instruct family/caregiver on patient safety   Based on caregiver fall risk screen, instruct family/caregiver to ask for assistance with transferring infant if caregiver noted to have fall risk factors     Problem: Chronic Conditions and Co-morbidities  Goal: Patient's chronic conditions and co-morbidity symptoms are monitored and maintained or improved  3/13/2025 1714 by Sergey Lott RN  Outcome: Progressing  Flowsheets (Taken 3/13/2025 0829)  Care Plan - Patient's Chronic Conditions and Co-Morbidity Symptoms are Monitored and Maintained or Improved: Monitor and assess patient's chronic conditions and comorbid symptoms for stability, deterioration, or improvement  3/13/2025 0648 by Yury Niño RN  Outcome: Progressing  Flowsheets (Taken 3/12/2025 2000)  Care Plan - Patient's Chronic Conditions and Co-Morbidity Symptoms are Monitored and Maintained or Improved:   Collaborate with multidisciplinary team to address chronic and comorbid conditions and prevent exacerbation or deterioration   Monitor and assess patient's chronic conditions and comorbid symptoms for stability, deterioration, or improvement   Update acute care plan with appropriate goals if chronic or comorbid symptoms are exacerbated and prevent overall improvement and discharge     Problem: Discharge Planning  Goal: Discharge to home or other facility with appropriate resources  3/13/2025 1714 by Sergey oLtt RN  Outcome: Progressing  Flowsheets (Taken 3/13/2025 0829)  Discharge to home or other facility with appropriate resources: Identify barriers to discharge with patient and caregiver  3/13/2025 0648 by Yury Niño RN  Outcome: Progressing  Flowsheets (Taken 3/12/2025 2000)  Discharge to home or

## 2025-03-13 NOTE — PROGRESS NOTES
Comprehensive Nutrition Assessment    Type and Reason for Visit:  Initial, Positive nutrition screen, LOS    Nutrition Recommendations/Plan:   Continue Current Diet (Full Liquid)  Continue ONS (Diabetic) BID   Continue to monitor nutrition progression and provide recommendations as indicated/medically appropriate  Consult RD as needed      Malnutrition Assessment:  Malnutrition Status:  At risk for malnutrition (03/13/25 1431)    Context:  Acute Illness     Findings of the 6 clinical characteristics of malnutrition:  Energy Intake:  50% or less of estimated energy requirements for 5 or more days  Weight Loss:  No weight loss     Body Fat Loss:  No body fat loss     Muscle Mass Loss:  No muscle mass loss    Fluid Accumulation:  Mild Extremities    Nutrition Assessment:    Pt admit w/ hematemesis and fall. Pt w/ Large Distal Antral Polyp which was biopsied, results pending. RRT called 03/08/25 d/t choking on H20. Pt fort for endoscopic resection gastric mass tomorrow. PMHx: HTN/HLD, DM, A-fib. Pt reports appetite improvin, pt on full liquid diet, tolerating. Pt will be NPO tonight for procedure. Will continue inpatient monitoring, nutrition progression and diet tolerance, f/up per policy.    Nutrition Related Findings:    A/O x4, I/O +519 since admit, abd wdl, +BS x4, trace edeme, e'lytes wnl Wound Type: None       Current Nutrition Intake & Therapies:    Average Meal Intake: 0%  Average Supplements Intake: 51-75%  ADULT ORAL NUTRITION SUPPLEMENT; Breakfast, Lunch, Dinner; Diabetic Oral Supplement  ADULT DIET; Full Liquid; 4 carb choices (60 gm/meal); Low Sodium (2 gm); No red dye    Anthropometric Measures:  Height: 173 cm (5' 8.11\")  Ideal Body Weight (IBW): 155 lbs (70 kg)    Admission Body Weight: 117.9 kg (259 lb 14.8 oz) (03/08/25 bed scale)  Current Body Weight: 117.9 kg (259 lb 14.8 oz), 167.7 % IBW. Weight Source: Bed scale (03/08/25)  Current BMI (kg/m2): 39.4  Usual Body Weight: 117.9 kg (259 lb 14.8 oz)  (11/20/23 not specified)     % Weight Change (Calculated): 0  Weight Adjustment For: No Adjustment        BMI Categories: Obese Class 2 (BMI 35.0 -39.9)    Estimated Daily Nutrient Needs:  Energy Requirements Based On: Kcal/kg  Weight Used for Energy Requirements: Current  Energy (kcal/day): 5460-1895 kcals/day (CBW 15-18 kcals/kg Obese BMI 30-40)  Weight Used for Protein Requirements: Ideal  Protein (g/day):  g/day (IBW 1.2-1.5 g/kg Obese BMI)  Method Used for Fluid Requirements: 1 ml/kcal  Fluid (ml/day): 5826-6196 mls/day or per care team    Nutrition Diagnosis:   Inadequate oral intake related to altered GI function, Altered GI structure as evidenced by NPO or clear liquid status due to medical condition, poor intake prior to admission, other (pt on full liquid diet)    Nutrition Interventions:   Food and/or Nutrient Delivery: Continue Current Diet, Continue Oral Nutrition Supplement  Nutrition Education/Counseling: Education/Counseling not indicated  Coordination of Nutrition Care: Continue to monitor while inpatient       Goals:  Goals: Meet at least 75% of estimated needs, by next RD assessment  Type of Goal: New goal       Nutrition Monitoring and Evaluation:   Behavioral-Environmental Outcomes: None Identified  Food/Nutrient Intake Outcomes: Supplement Intake, Diet Advancement/Tolerance  Physical Signs/Symptoms Outcomes: Biochemical Data, GI Status, Fluid Status or Edema, Weight, Skin, Nutrition Focused Physical Findings    Discharge Planning:    Too soon to determine     Elisabeth Garner RD, LD  Contact: [phone]

## 2025-03-13 NOTE — PLAN OF CARE
Problem: Safety - Adult  Goal: Free from fall injury  3/13/2025 0648 by Yury Niño RN  Outcome: Progressing  Flowsheets (Taken 3/12/2025 2000)  Free From Fall Injury:   Instruct family/caregiver on patient safety   Based on caregiver fall risk screen, instruct family/caregiver to ask for assistance with transferring infant if caregiver noted to have fall risk factors  3/12/2025 1756 by Sergey Lott RN  Outcome: Progressing     Problem: Chronic Conditions and Co-morbidities  Goal: Patient's chronic conditions and co-morbidity symptoms are monitored and maintained or improved  3/13/2025 0648 by Yury Niño RN  Outcome: Progressing  Flowsheets (Taken 3/12/2025 2000)  Care Plan - Patient's Chronic Conditions and Co-Morbidity Symptoms are Monitored and Maintained or Improved:   Collaborate with multidisciplinary team to address chronic and comorbid conditions and prevent exacerbation or deterioration   Monitor and assess patient's chronic conditions and comorbid symptoms for stability, deterioration, or improvement   Update acute care plan with appropriate goals if chronic or comorbid symptoms are exacerbated and prevent overall improvement and discharge  3/12/2025 1756 by Sergey Lott RN  Outcome: Progressing  Flowsheets (Taken 3/12/2025 0725)  Care Plan - Patient's Chronic Conditions and Co-Morbidity Symptoms are Monitored and Maintained or Improved: Monitor and assess patient's chronic conditions and comorbid symptoms for stability, deterioration, or improvement     Problem: Discharge Planning  Goal: Discharge to home or other facility with appropriate resources  3/13/2025 0648 by Yury Niño RN  Outcome: Progressing  Flowsheets (Taken 3/12/2025 2000)  Discharge to home or other facility with appropriate resources:   Identify barriers to discharge with patient and caregiver   Arrange for needed discharge resources and transportation as appropriate   Identify discharge learning needs  (meds, wound care, etc)   Refer to discharge planning if patient needs post-hospital services based on physician order or complex needs related to functional status, cognitive ability or social support system  3/12/2025 1756 by Sergey Lott RN  Outcome: Progressing  Flowsheets (Taken 3/12/2025 0725)  Discharge to home or other facility with appropriate resources: Identify barriers to discharge with patient and caregiver     Problem: Pain  Goal: Verbalizes/displays adequate comfort level or baseline comfort level  3/13/2025 0648 by Yury Niño RN  Outcome: Progressing  3/12/2025 1756 by Sergey Lott RN  Outcome: Progressing     Problem: Skin/Tissue Integrity  Goal: Skin integrity remains intact  Description: 1.  Monitor for areas of redness and/or skin breakdown  2.  Assess vascular access sites hourly  3.  Every 4-6 hours minimum:  Change oxygen saturation probe site  4.  Every 4-6 hours:  If on nasal continuous positive airway pressure, respiratory therapy assess nares and determine need for appliance change or resting period  3/13/2025 0648 by Yury Niño RN  Outcome: Progressing  Flowsheets (Taken 3/12/2025 2000)  Skin Integrity Remains Intact:   Monitor for areas of redness and/or skin breakdown   Assess vascular access sites hourly  3/12/2025 1756 by Sergey Lott RN  Outcome: Progressing  Flowsheets (Taken 3/12/2025 0725)  Skin Integrity Remains Intact: Monitor for areas of redness and/or skin breakdown     Problem: ABCDS Injury Assessment  Goal: Absence of physical injury  3/13/2025 0648 by Yury Niño RN  Outcome: Progressing  Flowsheets (Taken 3/12/2025 2000)  Absence of Physical Injury: Implement safety measures based on patient assessment  3/12/2025 1756 by Sergey Lott RN  Outcome: Progressing

## 2025-03-13 NOTE — PROGRESS NOTES
[facility]  General Surgery Progress Note      Subjective: Feels well this morning. Tolerating diet, having normal bowel function.     Objective:  /64   Pulse 78   Temp 98.1 °F (36.7 °C) (Oral)   Resp 16   Ht 1.73 m (5' 8.11\")   Wt 117.9 kg (260 lb)   SpO2 98%   BMI 39.41 kg/m²   Labs:  Recent Labs     03/11/25  0641 03/12/25  0633 03/13/25  0754   WBC 5.5 6.1 4.5   HGB 7.7* 8.4* 8.3*   HCT 22.6* 25.8* 24.7*     Lab Results   Component Value Date    CREATININE 1.8 (H) 03/13/2025    BUN 11 03/13/2025     03/13/2025    K 4.1 03/13/2025     03/13/2025    CO2 28 03/13/2025     No results for input(s): \"LIPASE\", \"AMYLASE\" in the last 72 hours.    General: Resting comfortably, NAD  Head: normocephalic, atraumatic  Neck: supple, trachea midline  Lungs: unlabored breathing, equal chest rise bilaterally  Heart: Regular rate  Abdomen: soft, obese, nontender  Extremities: normal ROM, no edema    Assessment/Plan:  Brooke KRISHAN Vaughan is a 79 y.o. male with symptomatic anemia, due to bleeding gastric mass status post EGD and biopsy 3/8. Hemoglobin stable since procedure.     NPO @ [place] for endoscopic resection tomorrow 3/14 with Dr Leal  Trending Hb  Holding anticoagulation  Diet post procedure  Follow up final EGD biopsy pathology    Octavio Randolph DO  Bariatric and General Surgery  [facility]  3/13/2025  10:16 AM

## 2025-03-13 NOTE — CARE COORDINATION
3/13/25  Note: Pt on IV protonix & fluids. Followed up with pt & dtr @ bedside. Dtr reported that pt is scheduled EDG tomorrow & this hospital. Discharge plan remains for pt to return home, his dtr resides with him. Pt [facility] & [facility] Betty Rodríguez is [facility].  [facility] other than [facility] supplements. Pt on xarelto pta & dtr reported all of pts meds are covered 100%. Pt [facility] & [facility]ap [facility]. Dtr pts primary support. No current d/c needs determined, will monitor. Electronically signed by MARLEE Hugo on 3/13/2025 at 11:53 AM

## 2025-03-13 NOTE — PROGRESS NOTES
PROGRESS NOTE  By Pietro Ann M.D.    [facility]  Dr. Kristen Lowery M.D.,  Dr. Jose Dyson M.D.,   Dr. Nelson Armenta D.O.,  Dr. Jean-Pierre Harrison M.D.,  MCKAYLA LawrenceO.,          Brooke Vaughan  79 y.o.  male    SUBJECTIVE:  No new complaints.  Daughter at bedside    OBJECTIVE:    /64   Pulse 78   Temp 98.1 °F (36.7 °C) (Oral)   Resp 16   Ht 1.73 m (5' 8.11\")   Wt 117.9 kg (260 lb)   SpO2 98%   BMI 39.41 kg/m²     General: NAD/older adult  male.  HEENT: Anicteric sclera/moist oral mucosa  Neck: Supple/trachea midline  Chest: Symmetric excursion/CTAB  Cor: Regular  Abd.: Soft and obese.  Nontender  Extr.:  Mild lower extremity edema  Skin: Warm and dry.  Anicteric    DATA:    Monitor data reviewed -sinus rhythm noted.    Stool (measured) : 1 mL  Lab Results   Component Value Date/Time    WBC 4.5 03/13/2025 07:54 AM    RBC 2.49 03/13/2025 07:54 AM    HGB 8.3 03/13/2025 07:54 AM    HCT 24.7 03/13/2025 07:54 AM    MCV 99.2 03/13/2025 07:54 AM    MCH 33.3 03/13/2025 07:54 AM    MCHC 33.6 03/13/2025 07:54 AM    RDW 16.7 03/13/2025 07:54 AM    PLT 92 03/13/2025 07:54 AM    MPV 9.6 03/13/2025 07:54 AM     Lab Results   Component Value Date/Time     03/13/2025 07:54 AM    K 4.1 03/13/2025 07:54 AM    K 4.5 05/07/2019 11:05 AM     03/13/2025 07:54 AM    CO2 28 03/13/2025 07:54 AM    BUN 11 03/13/2025 07:54 AM    CREATININE 1.8 03/13/2025 07:54 AM    CALCIUM 8.7 03/13/2025 07:54 AM    BILITOT 0.7 03/13/2025 07:54 AM    ALKPHOS 64 03/13/2025 07:54 AM    AST 26 03/13/2025 07:54 AM    ALT 21 03/13/2025 07:54 AM     Lab Results   Component Value Date/Time    LIPASE 6 03/06/2025 07:56 PM     No results found for: \"AMYLASE\"      ASSESSMENT/PLAN:  Patient Active Problem List   Diagnosis    GERD (gastroesophageal reflux disease)    Diabetes mellitus type 2, noninsulin dependent (HCC)    S/P TURP (status post transurethral resection of prostate)    Hyperglycemia    CHF

## 2025-03-13 NOTE — PROGRESS NOTES
Department of Internal Medicine  PN    [facility]   Admitting Physician: Dr. Connor  Consultants: Dr. Harrison      CHIEF COMPLAINT:  fall    HISTORY OF PRESENT ILLNESS:    Patient presents to ER due to falling at home twice today; states he felt lightheaded/dizzy and felt ringing in his ears, he is unsure if he lost consciousness the first fall. The second fall his daughter helped him lower himself to the ground. When he initially arrived to ER he threw up blood and had bloody diarrhea (first occurrence-no history of GI bleed, has history of anemia, on xarelto for AFIB, denies ASA/ETOH/NSAID use, takes tylenol for pain) he also has some shortness of breath with exertion. He denies new allergies. Unsure of medications-[facility]. His daughter Gerda and her son are present at cart-side, all questions answered at this time. Patient does not use nicotine, alcohol, marijuana, or illicit drugs. Lives at home with Gerda, independent with ADLs uses cane and sometimes rollator, no home health services. He is agreeable to admission for evaluation of hematemesis.    3/7/2025  Patient seen examined on monitored bed in ED hold.  Patient currently down nuclear medicine for GI bleeding scan.  Patient still has some abdominal discomfort and the lightheadedness is improved.  BUN/creatinine 33/2.1 with normal electrolytes.  Blood sugars range 240-246.  Liver enzymes are normal with a WBC of 10.9 hemoglobin 10.5.  INR is 1.4.  Heart rate is 89 with blood pressure 147/79 with O2 sat 96% on room air at rest.  Patient's daughter is at the bedside.  Patient overall feels better but still very weak.  Unfortunately patient had a CTA of the abdomen with IV contrast and a CTA of the chest.  These did not show any evidence of GI bleed or PE or acute pulmonary problem.  There is no evidence any intraluminal hematoma or aneurysm.  Patient serum creatinine was 2.3 on admission.    3/8/2025  Patient seen examined on PCU/Endo.   pancreatic ductal system is not dilated the.  There is small foci of calcification in the tail of the pancreas which can be manifestation of previous pancreatitis. The spleen has a few calcified granulomas and appear unremarkable. The adrenals not enlarged. Kidneys maintain overall preserved size but there is a more prominent central fat renal sinus with relative thinning of the renal parenchyma.  There is normal arterial enhancement for the cortex of the kidneys and also of the late parenchymal enhancement.  There is no renal calculus signs for obstructive uropathy. There is no midline retroperitoneal adenopathy.  The IVC appear unremarkable. The bladder is moderate distended with unremarkable appearance.  Prostate gland is not enlarged.  Seminal vesicles appear unremarkable and symmetrical. There are no acute inflammatory changes in the omental/mesenteric fat planes, free intraperitoneal, ascites or indication for bowel obstruction. Appendix seen, it appears unremarkable.  There are no signs for diverticulitis or colitis.  There fecal contents in the distal colon, could represent mild constipation. There is GB changes are seen more prominent in the L5-S1 level of the lower lumbar spine.  Minimal spondylosis seen in the thoracic spine.  Old depression of sub.  Endplates of midthoracic vertebral bodies more like old findings. There also degenerative changes in the facet joints of the lower lumbar spine segments.     1.  No demonstration of acute GI bleeding from the thoracic esophagus down to the distal colon/rectum area. 2.  Can consider further evaluation with GI bleeding scan. 3.  No indication for acute pulmonary embolus in the central pulmonary arteries.  Contrast was not ideal to evaluate the pulmonary arteries are is more peripherally. 4.  No intramural hematoma/aneurysm formation/dissection of the entire aorta/iliac vessels. 5.  No acute pulmonary parenchymal process. 6.  No indication for acute

## 2025-03-14 ENCOUNTER — ANESTHESIA (OUTPATIENT)
Dept: OPERATING ROOM | Age: 80
End: 2025-03-14
Payer: OTHER GOVERNMENT

## 2025-03-14 ENCOUNTER — ANESTHESIA EVENT (OUTPATIENT)
Dept: OPERATING ROOM | Age: 80
End: 2025-03-14
Payer: OTHER GOVERNMENT

## 2025-03-14 LAB
ALBUMIN SERPL-MCNC: 3.4 G/DL (ref 3.5–5.2)
ALP SERPL-CCNC: 66 U/L (ref 40–129)
ALT SERPL-CCNC: 19 U/L (ref 0–40)
ANION GAP SERPL CALCULATED.3IONS-SCNC: 10 MMOL/L (ref 7–16)
AST SERPL-CCNC: 24 U/L (ref 0–39)
ATYPICAL LYMPHOCYTE ABSOLUTE COUNT: 0.08 K/UL (ref 0–0.46)
ATYPICAL LYMPHOCYTES: 2 % (ref 0–4)
BASOPHILS # BLD: 0 K/UL (ref 0–0.2)
BASOPHILS NFR BLD: 0 % (ref 0–2)
BILIRUB SERPL-MCNC: 0.7 MG/DL (ref 0–1.2)
BUN SERPL-MCNC: 11 MG/DL (ref 6–23)
CALCIUM SERPL-MCNC: 8.5 MG/DL (ref 8.6–10.2)
CHLORIDE SERPL-SCNC: 102 MMOL/L (ref 98–107)
CO2 SERPL-SCNC: 26 MMOL/L (ref 22–29)
CREAT SERPL-MCNC: 1.7 MG/DL (ref 0.7–1.2)
EOSINOPHIL # BLD: 0.08 K/UL (ref 0.05–0.5)
EOSINOPHILS RELATIVE PERCENT: 2 % (ref 0–6)
ERYTHROCYTE [DISTWIDTH] IN BLOOD BY AUTOMATED COUNT: 16.3 % (ref 11.5–15)
GFR, ESTIMATED: 39 ML/MIN/1.73M2
GLUCOSE BLD-MCNC: 181 MG/DL (ref 74–99)
GLUCOSE BLD-MCNC: 212 MG/DL (ref 74–99)
GLUCOSE BLD-MCNC: 220 MG/DL (ref 74–99)
GLUCOSE BLD-MCNC: 354 MG/DL (ref 74–99)
GLUCOSE BLD-MCNC: 419 MG/DL (ref 74–99)
GLUCOSE SERPL-MCNC: 167 MG/DL (ref 74–99)
HCT VFR BLD AUTO: 25.4 % (ref 37–54)
HGB BLD-MCNC: 8.5 G/DL (ref 12.5–16.5)
LYMPHOCYTES NFR BLD: 1.47 K/UL (ref 1.5–4)
LYMPHOCYTES RELATIVE PERCENT: 31 % (ref 20–42)
MCH RBC QN AUTO: 33.3 PG (ref 26–35)
MCHC RBC AUTO-ENTMCNC: 33.5 G/DL (ref 32–34.5)
MCV RBC AUTO: 99.6 FL (ref 80–99.9)
MONOCYTES NFR BLD: 0.33 K/UL (ref 0.1–0.95)
MONOCYTES NFR BLD: 7 % (ref 2–12)
MYELOCYTES ABSOLUTE COUNT: 0.04 K/UL
MYELOCYTES: 1 %
NEUTROPHILS NFR BLD: 57 % (ref 43–80)
NEUTS SEG NFR BLD: 2.7 K/UL (ref 1.8–7.3)
PLATELET # BLD AUTO: 94 K/UL (ref 130–450)
PLATELET CONFIRMATION: NORMAL
PMV BLD AUTO: 9.3 FL (ref 7–12)
POTASSIUM SERPL-SCNC: 4.1 MMOL/L (ref 3.5–5)
PROT SERPL-MCNC: 5.5 G/DL (ref 6.4–8.3)
RBC # BLD AUTO: 2.55 M/UL (ref 3.8–5.8)
RBC # BLD: ABNORMAL 10*6/UL
RBC # BLD: ABNORMAL 10*6/UL
SODIUM SERPL-SCNC: 138 MMOL/L (ref 132–146)
WBC OTHER # BLD: 4.7 K/UL (ref 4.5–11.5)

## 2025-03-14 PROCEDURE — 36415 COLL VENOUS BLD VENIPUNCTURE: CPT

## 2025-03-14 PROCEDURE — 2580000003 HC RX 258: Performed by: HOSPITALIST

## 2025-03-14 PROCEDURE — 99222 1ST HOSP IP/OBS MODERATE 55: CPT | Performed by: SURGERY

## 2025-03-14 PROCEDURE — 7100000011 HC PHASE II RECOVERY - ADDTL 15 MIN: Performed by: SURGERY

## 2025-03-14 PROCEDURE — 0DB68ZX EXCISION OF STOMACH, VIA NATURAL OR ARTIFICIAL OPENING ENDOSCOPIC, DIAGNOSTIC: ICD-10-PCS | Performed by: SURGERY

## 2025-03-14 PROCEDURE — 3600007513: Performed by: SURGERY

## 2025-03-14 PROCEDURE — 3700000001 HC ADD 15 MINUTES (ANESTHESIA): Performed by: SURGERY

## 2025-03-14 PROCEDURE — 82962 GLUCOSE BLOOD TEST: CPT

## 2025-03-14 PROCEDURE — 2060000000 HC ICU INTERMEDIATE R&B

## 2025-03-14 PROCEDURE — 85025 COMPLETE CBC W/AUTO DIFF WBC: CPT

## 2025-03-14 PROCEDURE — 7100000010 HC PHASE II RECOVERY - FIRST 15 MIN: Performed by: SURGERY

## 2025-03-14 PROCEDURE — 0W3P8ZZ CONTROL BLEEDING IN GASTROINTESTINAL TRACT, VIA NATURAL OR ARTIFICIAL OPENING ENDOSCOPIC: ICD-10-PCS | Performed by: SURGERY

## 2025-03-14 PROCEDURE — 6370000000 HC RX 637 (ALT 250 FOR IP)

## 2025-03-14 PROCEDURE — 2720000010 HC SURG SUPPLY STERILE: Performed by: SURGERY

## 2025-03-14 PROCEDURE — 80053 COMPREHEN METABOLIC PANEL: CPT

## 2025-03-14 PROCEDURE — 3600007503: Performed by: SURGERY

## 2025-03-14 PROCEDURE — 6360000002 HC RX W HCPCS: Performed by: HOSPITALIST

## 2025-03-14 PROCEDURE — 2580000003 HC RX 258: Performed by: INTERNAL MEDICINE

## 2025-03-14 PROCEDURE — 2500000003 HC RX 250 WO HCPCS: Performed by: NURSE ANESTHETIST, CERTIFIED REGISTERED

## 2025-03-14 PROCEDURE — 3700000000 HC ANESTHESIA ATTENDED CARE: Performed by: SURGERY

## 2025-03-14 PROCEDURE — 2709999900 HC NON-CHARGEABLE SUPPLY: Performed by: SURGERY

## 2025-03-14 PROCEDURE — C1889 IMPLANT/INSERT DEVICE, NOC: HCPCS | Performed by: SURGERY

## 2025-03-14 PROCEDURE — 0DB78ZX EXCISION OF STOMACH, PYLORUS, VIA NATURAL OR ARTIFICIAL OPENING ENDOSCOPIC, DIAGNOSTIC: ICD-10-PCS | Performed by: SURGERY

## 2025-03-14 PROCEDURE — 6360000002 HC RX W HCPCS: Performed by: NURSE ANESTHETIST, CERTIFIED REGISTERED

## 2025-03-14 DEVICE — CLIP
Type: IMPLANTABLE DEVICE | Status: FUNCTIONAL
Brand: RESOLUTION 360™ ULTRA CLIP

## 2025-03-14 DEVICE — CLIPPING DEVICE
Type: IMPLANTABLE DEVICE | Status: FUNCTIONAL
Brand: RESOLUTION CLIP

## 2025-03-14 RX ORDER — DEXAMETHASONE SODIUM PHOSPHATE 10 MG/ML
INJECTION, SOLUTION INTRAMUSCULAR; INTRAVENOUS
Status: DISCONTINUED | OUTPATIENT
Start: 2025-03-14 | End: 2025-03-14 | Stop reason: SDUPTHER

## 2025-03-14 RX ORDER — NEOSTIGMINE METHYLSULFATE 1 MG/ML
INJECTION, SOLUTION INTRAVENOUS
Status: DISCONTINUED | OUTPATIENT
Start: 2025-03-14 | End: 2025-03-14 | Stop reason: SDUPTHER

## 2025-03-14 RX ORDER — ROCURONIUM BROMIDE 10 MG/ML
INJECTION, SOLUTION INTRAVENOUS
Status: DISCONTINUED | OUTPATIENT
Start: 2025-03-14 | End: 2025-03-14 | Stop reason: SDUPTHER

## 2025-03-14 RX ORDER — INSULIN LISPRO 100 [IU]/ML
0-16 INJECTION, SOLUTION INTRAVENOUS; SUBCUTANEOUS
Status: DISCONTINUED | OUTPATIENT
Start: 2025-03-14 | End: 2025-03-15 | Stop reason: HOSPADM

## 2025-03-14 RX ORDER — GLYCOPYRROLATE 0.2 MG/ML
INJECTION INTRAMUSCULAR; INTRAVENOUS
Status: DISCONTINUED | OUTPATIENT
Start: 2025-03-14 | End: 2025-03-14 | Stop reason: SDUPTHER

## 2025-03-14 RX ORDER — LIDOCAINE HYDROCHLORIDE 20 MG/ML
INJECTION, SOLUTION INTRAVENOUS
Status: DISCONTINUED | OUTPATIENT
Start: 2025-03-14 | End: 2025-03-14 | Stop reason: SDUPTHER

## 2025-03-14 RX ORDER — FENTANYL CITRATE 50 UG/ML
INJECTION, SOLUTION INTRAMUSCULAR; INTRAVENOUS
Status: DISCONTINUED | OUTPATIENT
Start: 2025-03-14 | End: 2025-03-14 | Stop reason: SDUPTHER

## 2025-03-14 RX ORDER — PROPOFOL 10 MG/ML
INJECTION, EMULSION INTRAVENOUS
Status: DISCONTINUED | OUTPATIENT
Start: 2025-03-14 | End: 2025-03-14 | Stop reason: SDUPTHER

## 2025-03-14 RX ADMIN — INSULIN LISPRO 16 UNITS: 100 INJECTION, SOLUTION INTRAVENOUS; SUBCUTANEOUS at 21:30

## 2025-03-14 RX ADMIN — SODIUM CHLORIDE, PRESERVATIVE FREE 40 MG: 5 INJECTION INTRAVENOUS at 05:36

## 2025-03-14 RX ADMIN — PROPOFOL 130 MG: 10 INJECTION, EMULSION INTRAVENOUS at 13:17

## 2025-03-14 RX ADMIN — SODIUM CHLORIDE, PRESERVATIVE FREE 40 MG: 5 INJECTION INTRAVENOUS at 16:42

## 2025-03-14 RX ADMIN — LEVOTHYROXINE SODIUM 50 MCG: 0.05 TABLET ORAL at 05:36

## 2025-03-14 RX ADMIN — Medication 3 MG: at 13:45

## 2025-03-14 RX ADMIN — FENTANYL CITRATE 50 MCG: 50 INJECTION, SOLUTION INTRAMUSCULAR; INTRAVENOUS at 13:17

## 2025-03-14 RX ADMIN — DULOXETINE HYDROCHLORIDE 30 MG: 30 CAPSULE, DELAYED RELEASE ORAL at 20:46

## 2025-03-14 RX ADMIN — INSULIN GLARGINE 28 UNITS: 100 INJECTION, SOLUTION SUBCUTANEOUS at 20:46

## 2025-03-14 RX ADMIN — INSULIN LISPRO 2 UNITS: 100 INJECTION, SOLUTION INTRAVENOUS; SUBCUTANEOUS at 16:42

## 2025-03-14 RX ADMIN — ROCURONIUM BROMIDE 30 MG: 10 INJECTION, SOLUTION INTRAVENOUS at 13:17

## 2025-03-14 RX ADMIN — DEXAMETHASONE SODIUM PHOSPHATE 5 MG: 10 INJECTION INTRAMUSCULAR; INTRAVENOUS at 13:33

## 2025-03-14 RX ADMIN — LIDOCAINE HYDROCHLORIDE 100 MG: 20 INJECTION, SOLUTION INTRAVENOUS at 13:17

## 2025-03-14 RX ADMIN — FENTANYL CITRATE 50 MCG: 50 INJECTION, SOLUTION INTRAMUSCULAR; INTRAVENOUS at 13:26

## 2025-03-14 RX ADMIN — SODIUM CHLORIDE: 9 INJECTION, SOLUTION INTRAVENOUS at 08:46

## 2025-03-14 RX ADMIN — GLYCOPYRROLATE 0.6 MG: 0.2 INJECTION INTRAMUSCULAR; INTRAVENOUS at 13:45

## 2025-03-14 ASSESSMENT — ENCOUNTER SYMPTOMS
RECTAL PAIN: 0
SINUS PRESSURE: 0
COUGH: 0
EYE DISCHARGE: 0
WHEEZING: 0
NAUSEA: 0
DIARRHEA: 1
BACK PAIN: 0
SORE THROAT: 0
BLOOD IN STOOL: 1
SHORTNESS OF BREATH: 1
SHORTNESS OF BREATH: 0
VOMITING: 0
EYE PAIN: 0
EYE REDNESS: 0
ABDOMINAL DISTENTION: 0
ABDOMINAL PAIN: 0

## 2025-03-14 NOTE — PLAN OF CARE
Problem: Safety - Adult  Goal: Free from fall injury  3/13/2025 2109 by Jesse Llanes RN  Outcome: Progressing  3/13/2025 1714 by Sergey Lott RN  Outcome: Progressing     Problem: Chronic Conditions and Co-morbidities  Goal: Patient's chronic conditions and co-morbidity symptoms are monitored and maintained or improved  3/13/2025 2109 by Jesse Llanes RN  Outcome: Progressing  3/13/2025 1714 by Sergey Lott RN  Outcome: Progressing  Flowsheets (Taken 3/13/2025 0829)  Care Plan - Patient's Chronic Conditions and Co-Morbidity Symptoms are Monitored and Maintained or Improved: Monitor and assess patient's chronic conditions and comorbid symptoms for stability, deterioration, or improvement     Problem: Discharge Planning  Goal: Discharge to home or other facility with appropriate resources  3/13/2025 2109 by Jesse Llanes RN  Outcome: Progressing  3/13/2025 1714 by Sergey Lott RN  Outcome: Progressing  Flowsheets (Taken 3/13/2025 0829)  Discharge to home or other facility with appropriate resources: Identify barriers to discharge with patient and caregiver     Problem: Pain  Goal: Verbalizes/displays adequate comfort level or baseline comfort level  3/13/2025 2109 by Jesse Llanes RN  Outcome: Progressing  3/13/2025 1714 by Sergey Lott RN  Outcome: Progressing     Problem: Skin/Tissue Integrity  Goal: Skin integrity remains intact  Description: 1.  Monitor for areas of redness and/or skin breakdown  2.  Assess vascular access sites hourly  3.  Every 4-6 hours minimum:  Change oxygen saturation probe site  4.  Every 4-6 hours:  If on nasal continuous positive airway pressure, respiratory therapy assess nares and determine need for appliance change or resting period  3/13/2025 2109 by Jesse Llanes RN  Outcome: Progressing  3/13/2025 1714 by Sergey Lott RN  Outcome: Progressing  Flowsheets (Taken 3/13/2025 0829)  Skin Integrity Remains Intact: Monitor for areas of redness and/or skin breakdown      Problem: ABCDS Injury Assessment  Goal: Absence of physical injury  3/13/2025 2109 by Jesse Llanes RN  Outcome: Progressing  3/13/2025 1714 by Sergey Lott RN  Outcome: Progressing     Problem: Nutrition Deficit:  Goal: Optimize nutritional status  3/13/2025 2109 by Jesse Llanes RN  Outcome: Progressing  3/13/2025 1714 by Sergey Lott RN  Outcome: Progressing  3/13/2025 1432 by Elisabeth Garner, RD, LD  Outcome: Progressing  Flowsheets (Taken 3/13/2025 1432)  Nutrient intake appropriate for improving, restoring, or maintaining nutritional needs:   Assess nutritional status and recommend course of action   Recommend appropriate diets, oral nutritional supplements, and vitamin/mineral supplements   Order, calculate, and assess calorie counts as needed   Monitor oral intake, labs, and treatment plans

## 2025-03-14 NOTE — PROGRESS NOTES
General SURGERY  DAILY PROGRESS NOTE  3/14/2025    CHIEF COMPLAINT:  Chief Complaint   Patient presents with    Hematemesis     EMS states that they were called for fall. Pt started to vomit blood upon arrival        SUBJECTIVE:  Feeling well this morning.  No dark or bloody bowel movements overnight. For endoscopic resection today.     OBJECTIVE:  /62   Pulse 78   Temp 97.5 °F (36.4 °C) (Axillary)   Resp 16   Ht 1.73 m (5' 8.11\")   Wt 117.9 kg (260 lb)   SpO2 95%   BMI 39.41 kg/m²     GENERAL:  NAD. A&Ox3.  HEENT: Normocephalic  LUNGS: On room air. No acute respiratory distress  CARDIOVASCULAR: hemodynamically stable  SKIN: Warm and dry  ABDOMEN:  Soft, non-distended, nontender. No guarding, rigidity, rebound.  EXT: ROM intact all 4 extremities, no obvious deformities    CBC  Recent Labs     03/13/25  0754 03/14/25  0559   WBC 4.5 4.7   HGB 8.3* 8.5*   HCT 24.7* 25.4*   MCV 99.2 99.6   PLT 92* 94*   MPV 9.6 9.3       CMP  Recent Labs     03/13/25  0754 03/14/25  0559    138   K 4.1 4.1    102   CO2 28 26   BUN 11 11   CREATININE 1.8* 1.7*   GLUCOSE 188* 167*   CALCIUM 8.7 8.5*   BILITOT 0.7 0.7   ALKPHOS 64 66   AST 26 24   ALT 21 19         ASSESSMENT/PLAN:  79 y.o. male with ulcerated, bleeding gastric mass    Plan  -for endoscopic resection today  -path consistent with polyp   -Transfuse for hemoglobin less than 7 or if patient becomes unstable  -Continue to hold anticoagulation  -Monitor abdominal exam    Mikal Prado MD  Surgery Resident PGY-4  3/14/2025  8:27 AM     As above. I saw and examined the patient and agree with the resident's assessment and plan.   Admits to feeling cold. Blood when wiping, no blood in toilet bowel when he had a BM. Hb stable. OR for endoscopic resection gastric mass today. Biopsy pathology from 3/8 hyperplastic polyp with gastritis/ulcer. Hpylori negative.

## 2025-03-14 NOTE — PROGRESS NOTES
PROGRESS NOTE  By Pietro Ann M.D.    [facility]  Dr. Kristen Lowery M.D.,  Dr. Jose Dyson M.D.,   Dr. Nelson Armenta D.O.,  Dr. Jean-Pierre Harrison M.D.,  MCKAYLA LawrenceO.,          Brooke Vaughan  79 y.o.  male    SUBJECTIVE:  No new complaints including no abdominal pain.  No family at bedside    OBJECTIVE:    /62   Pulse 78   Temp 97.5 °F (36.4 °C) (Axillary)   Resp 16   Ht 1.73 m (5' 8.11\")   Wt 117.9 kg (260 lb)   SpO2 95%   BMI 39.41 kg/m²     General: NAD/adult  male.  AAOx3.  Obese  HEENT: Anicteric sclera/moist oral mucosa  Neck: Supple with trachea midline  Chest: Symmetric excursion/CTAB  Cor: Irregular  Abd.: Soft.  Obese.  Nontender.  Extr.:  BLE mild edema.  Decreased muscle tone and bulk throughout  Skin: Warm and dry/anicteric      DATA:    Monitor data reviewed -atrial fibrillation noted.    Stool (measured) : 1 mL  Lab Results   Component Value Date/Time    WBC 4.7 03/14/2025 05:59 AM    RBC 2.55 03/14/2025 05:59 AM    HGB 8.5 03/14/2025 05:59 AM    HCT 25.4 03/14/2025 05:59 AM    MCV 99.6 03/14/2025 05:59 AM    MCH 33.3 03/14/2025 05:59 AM    MCHC 33.5 03/14/2025 05:59 AM    RDW 16.3 03/14/2025 05:59 AM    PLT 94 03/14/2025 05:59 AM    MPV 9.3 03/14/2025 05:59 AM     Lab Results   Component Value Date/Time     03/14/2025 05:59 AM    K 4.1 03/14/2025 05:59 AM    K 4.5 05/07/2019 11:05 AM     03/14/2025 05:59 AM    CO2 26 03/14/2025 05:59 AM    BUN 11 03/14/2025 05:59 AM    CREATININE 1.7 03/14/2025 05:59 AM    CALCIUM 8.5 03/14/2025 05:59 AM    BILITOT 0.7 03/14/2025 05:59 AM    ALKPHOS 66 03/14/2025 05:59 AM    AST 24 03/14/2025 05:59 AM    ALT 19 03/14/2025 05:59 AM     Lab Results   Component Value Date/Time    LIPASE 6 03/06/2025 07:56 PM     No results found for: \"AMYLASE\"      ASSESSMENT/PLAN:  Patient Active Problem List   Diagnosis    GERD (gastroesophageal reflux disease)    Diabetes mellitus type 2, noninsulin dependent (HCC)    S/P  appears stabilized without evidence of overt bleed.  Negative nuclear medicine bleeding scan 3/7/2025  Advanced diet tolerated  Biopsies reported to show hyperplastic features, active/chronic gastritis.  Biopsies are negative for Avelar's esophagus, H. pylori, dysplasia, intestinal metaplasia.  No immediate plan for inpatient colonoscopy  General Surgery input appreciated -plan for surgical intervention today    .  Pietro Ann MD  3/14/2025  8:44 AM    NOTE:  This report was transcribed using voice recognition software.  Every effort was made to ensure accuracy; however, inadvertent computerized transcription errors may be present.

## 2025-03-14 NOTE — ANESTHESIA POSTPROCEDURE EVALUATION
Department of Anesthesiology  Postprocedure Note    Patient: Brooke Vaughan  MRN: [id number]  YOB: 1945  Date of evaluation: 3/14/2025    Procedure Summary       Date: 03/14/25 Room / Location: [address]    Anesthesia Start: 1314 Anesthesia Stop: 1357    Procedure: ESOPHAGOGASTRODUODENOSCOPY GASTRIC MASS REMOVAL Diagnosis:       Gastric mass      (Gastric mass [K31.89])    Surgeons: Dominick Leal MD Responsible Provider: Morgan Cabral DO    Anesthesia Type: general ASA Status: 4            Anesthesia Type: No value filed.    Jasmeet Phase I:      Jasmeet Phase II: Jasmeet Score: 10    Anesthesia Post Evaluation    Patient location during evaluation: PACU  Patient participation: complete - patient participated  Level of consciousness: awake and alert  Pain score: 0  Airway patency: patent  Nausea & Vomiting: no nausea and no vomiting  Cardiovascular status: blood pressure returned to baseline and hemodynamically stable  Respiratory status: acceptable  Hydration status: stable  Pain management: adequate    No notable events documented.

## 2025-03-14 NOTE — PROGRESS NOTES
Department of Internal Medicine  PN    [facility]   Admitting Physician: Dr. Connor  Consultants: Dr. Harrison      CHIEF COMPLAINT:  fall    HISTORY OF PRESENT ILLNESS:    Patient presents to ER due to falling at home twice today; states he felt lightheaded/dizzy and felt ringing in his ears, he is unsure if he lost consciousness the first fall. The second fall his daughter helped him lower himself to the ground. When he initially arrived to ER he threw up blood and had bloody diarrhea (first occurrence-no history of GI bleed, has history of anemia, on xarelto for AFIB, denies ASA/ETOH/NSAID use, takes tylenol for pain) he also has some shortness of breath with exertion. He denies new allergies. Unsure of medications-[facility] His daughter Gerda and her son are present at cart-side, all questions answered at this time. Patient does not use nicotine, alcohol, marijuana, or illicit drugs. Lives at home with Gerda, independent with ADLs uses cane and sometimes rollator, no home health services. He is agreeable to admission for evaluation of hematemesis.    3/7/2025  Patient seen examined on monitored bed in ED hold.  Patient currently down nuclear medicine for GI bleeding scan.  Patient still has some abdominal discomfort and the lightheadedness is improved.  BUN/creatinine 33/2.1 with normal electrolytes.  Blood sugars range 240-246.  Liver enzymes are normal with a WBC of 10.9 hemoglobin 10.5.  INR is 1.4.  Heart rate is 89 with blood pressure 147/79 with O2 sat 96% on room air at rest.  Patient's daughter is at the bedside.  Patient overall feels better but still very weak.  Unfortunately patient had a CTA of the abdomen with IV contrast and a CTA of the chest.  These did not show any evidence of GI bleed or PE or acute pulmonary problem.  There is no evidence any intraluminal hematoma or aneurysm.  Patient serum creatinine was 2.3 on admission.    3/8/2025  Patient seen examined on PCU/Endo.   There also degenerative changes in the facet joints of the lower lumbar spine segments.     1.  No demonstration of acute GI bleeding from the thoracic esophagus down to the distal colon/rectum area. 2.  Can consider further evaluation with GI bleeding scan. 3.  No indication for acute pulmonary embolus in the central pulmonary arteries.  Contrast was not ideal to evaluate the pulmonary arteries are is more peripherally. 4.  No intramural hematoma/aneurysm formation/dissection of the entire aorta/iliac vessels. 5.  No acute pulmonary parenchymal process. 6.  No indication for acute intraperitoneal retroperitoneal process in the abdomen or in the pelvis.     CTA CHEST W CONTRAST    Result Date: 3/6/2025  EXAMINATION: CTA OF THE ABDOMEN AND PELVIS WITH CONTRAST; CTA OF THE CHEST 3/6/2025 8:39 pm: TECHNIQUE: CTA of the abdomen and pelvis was performed with the administration of intravenous contrast. Multiplanar reformatted images are provided for review. MIP images are provided for review. Automated exposure control, iterative reconstruction, and/or weight based adjustment of the mA/kV was utilized to reduce the radiation dose to as low as reasonably achievable.; CTA of the chest was performed after the administration of intravenous contrast. Multiplanar reformatted images are provided for review.  MIP images are provided for review. Automated exposure control, iterative reconstruction, and/or weight based adjustment of the mA/kV was utilized to reduce the radiation dose to as low as reasonably achievable. COMPARISON: None. HISTORY: ORDERING SYSTEM PROVIDED HISTORY: gi bleed TECHNOLOGIST PROVIDED HISTORY: Reason for exam:->gi bleed Additional Contrast?->None FINDINGS: 1.  CTA AORTA: THORACIC/ABDOMINAL/ILIAC VESSELS: Precontrast demonstrate no evidence for acute intramural hematoma in the wall of the thoracic aorta/abdominal aorta/iliac arteries. After IV contrast administration there is no indication for a aneurysm

## 2025-03-14 NOTE — ANESTHESIA PRE PROCEDURE
Department of Anesthesiology  Preprocedure Note       Name:  Brooke Vaughan   Age:  79 y.o.  :  1945                                          MRN:  [id number]         Date:  3/14/2025      Surgeon: Surgeon(s):  Dominick Leal MD    Procedure: Procedure(s):  ESOPHAGOGASTRODUODENOSCOPY GASTRIC MASS REMOVAL    Medications prior to admission:   Prior to Admission medications    Medication Sig Start Date End Date Taking? Authorizing Provider   isosorbide mononitrate (IMDUR) 30 MG extended release tablet Take 1 tablet by mouth daily 23   Jesse Clancy APRN - CNP   metoprolol succinate (TOPROL XL) 25 MG extended release tablet Take 1 tablet by mouth nightly 23   Jesse Clancy APRN - CNP   torsemide (DEMADEX) 20 MG tablet Take 1 tablet by mouth daily    Kwame Rubio MD   insulin aspart (NOVOLOG FLEXPEN) 100 UNIT/ML injection pen Inject into the skin 3 times daily (before meals) *Per Sliding Scale*  Give 1 extra unit for every 50 points >150    Kwame Rubio MD   pantoprazole (PROTONIX) 40 MG tablet Take 1 tablet by mouth daily    Kwame Rubio MD   rivaroxaban (XARELTO) 15 MG TABS tablet Take 1 tablet by mouth Daily with supper    Kwame Rubio MD   CPAP Machine MISC by Does not apply route nightly    Kwame Rubio MD   acetaminophen (TYLENOL) 325 MG tablet Take 1 tablet by mouth every 6 hours as needed for Pain    Kwame Rubio MD   nitroGLYCERIN (NITROSTAT) 0.4 MG SL tablet Place 1 tablet under the tongue every 5 minutes as needed for Chest pain up to max of 3 total doses. If no relief after 1 dose, call 911.    Kwame Rubio MD   fluocinonide (LIDEX) 0.05 % cream Apply 1 each topically 2 times daily Apply topically to rash twice daily.    Kwame Rubio MD   ferrous sulfate (IRON 325) 325 (65 Fe) MG tablet Take 1 tablet by mouth daily (with breakfast)    Kwame Rubio MD   DULoxetine (CYMBALTA) 20 MG extended release capsule Take 1

## 2025-03-14 NOTE — CONSULTS
Surgical Endoscopy  CONSULT NOTE  3/14/2025    Physician Consulted: Dr. Leal  Reason for Consult: Gastric polyp   Referring Physician: Dr. Agustín Cox KRISHAN Vaughan is a 79 y.o. male who presents to the endoscopy service for evaluation of large gastric polyp. The patient has been admitted since 3/6/2025 for treatment of gastrointestinal hemorrhage with hematemesis and fall. Workup performed in the ED, and EGD performed on 3/8/25 revealing 1.5 cm distal polyp and 5 cm antral polyp. No further melena or hematochezia.    Medical history is significant for A-fib, Type 2 DM, HTN . Abdominal surgical history is significant for cholecystectomy, EGD and colonoscopy [facility]. The patient is taking Xarelto 15 mg daily blood thinning medications.    Patient  reports that he quit smoking about 30 years ago. His smoking use included cigarettes. He started smoking about 55 years ago. He has a 12.5 pack-year smoking history. He has never used smokeless tobacco. He reports that he does not drink alcohol and does not use drugs.    Since onset the patient has been stable    Family history       Past Medical History:   Diagnosis Date    Arthritis     Atrial fibrillation (HCC)     Diabetes mellitus type 2, noninsulin dependent (HCC)     Dyspnea     H/O cardiovascular stress test 07/24/2023    Lexiscan    History of cardiovascular stress test 10/05/2018    Lexiscan stress test    History of echocardiogram 12/28/2017    EF  55%    Hyperlipidemia     Hypertension     Preoperative clearance     on chart for surgery with KRISHAN Han 12/2015       Past Surgical History:   Procedure Laterality Date    CARDIAC CATHETERIZATION  04/26/2012    CARDIAC CATHETERIZATION  10/12/2018    Dr. Soler    CARDIOVASCULAR STRESS TEST  08/11    CHOLECYSTECTOMY      DIAGNOSTIC CARDIAC CATH LAB PROCEDURE  11/8/2007    Dr. Gilbert No CAD EF 59%    DIAGNOSTIC CARDIAC CATH LAB PROCEDURE  4/26/2010    Dr. Castillo: EF 66% Moderate ectasia involving RCA and  MD Kwame   simethicone (MYLICON) 80 MG chewable tablet Take 1 tablet by mouth 4 times daily as needed for Flatulence    Kwame Rubio MD   melatonin 3 MG TABS tablet Take 1 tablet by mouth nightly    Kwame Rubio MD   levothyroxine (SYNTHROID) 50 MCG tablet Take 1 tablet by mouth every morning (before breakfast)    Kwame Rubio MD   vitamin B-12 (CYANOCOBALAMIN) 1000 MCG tablet Take 1 tablet by mouth daily    Kwame Rubio MD   Capsaicin 0.1 % CREA Apply 1 each topically 3 times daily    Kwame Rubio MD   benzonatate (TESSALON) 100 MG capsule Take 1 capsule by mouth 3 times daily as needed for Cough    Kwame Rubio MD   MENTHOL-METHYL SALICYLATE EX Apply 1 each topically 4 times daily    Kwame Rubio MD   carboxymethylcellulose (REFRESH PLUS) 0.5 % SOLN ophthalmic solution 1 drop 3 times daily    Kwame Rubio MD   albuterol sulfate  (90 Base) MCG/ACT inhaler Inhale 1-2 puffs into the lungs 4 times daily as needed for Wheezing    Kwame Rubio MD   albuterol (PROVENTIL) (2.5 MG/3ML) 0.083% nebulizer solution Take 3 mLs by nebulization every 6 hours as needed for Wheezing or Shortness of Breath    Kwame Rubio MD   insulin glargine (LANTUS) 100 UNIT/ML injection vial Inject 46 Units into the skin nightly    Kwame Rubio MD   polyethylene glycol (GLYCOLAX) powder Take 17 g by mouth daily    Kwame Rubio MD   tamsulosin (FLOMAX) 0.4 MG capsule Take 1 capsule by mouth nightly    Kwame Rubio MD   amiodarone (CORDARONE) 200 MG tablet Take 1 tablet by mouth daily    Kwame Rubio MD   aspirin 81 MG EC tablet Take 1 tablet by mouth daily    Kwame Rubio MD   cetirizine (ZYRTEC) 10 MG tablet Take 1 tablet by mouth daily    Kwame Rubio MD   Cholecalciferol (VITAMIN D) 2000 units CAPS capsule Take 2,000 Units by mouth daily    Kwame Rubio MD   insulin aspart  (NOVOLOG) 100 UNIT/ML injection pen Inject 15 Units into the skin 3 times daily (before meals) *Plus Sliding Scale*    Provider, MD Kwame   fluticasone (FLONASE) 50 MCG/ACT nasal spray 1 spray by Nasal route 2 times daily 12/30/17   Jesse Green MD   pravastatin (PRAVACHOL) 40 MG tablet Take 0.5 tablets by mouth nightly    Provider, MD Kwame       Allergies   Allergen Reactions    Dye [Iodides] Other (See Comments)     PATIENT STATES HE BLACKED OUT WHEN GIVEN IVP DYE       Family History   Problem Relation Age of Onset    Cancer Mother     Mental Illness Sister     Heart Disease Brother              Review of Systems   Constitutional:  Negative for chills and fever.   HENT:  Negative for ear pain, sinus pressure and sore throat.    Eyes:  Negative for pain, discharge and redness.   Respiratory:  Negative for cough, shortness of breath and wheezing.    Cardiovascular:  Negative for chest pain.   Gastrointestinal:  Positive for blood in stool and diarrhea. Negative for abdominal distention, abdominal pain, nausea, rectal pain and vomiting.   Genitourinary:  Negative for dysuria and frequency.   Musculoskeletal:  Negative for arthralgias and back pain.   Skin:  Negative for rash and wound.   Neurological:  Negative for weakness and headaches.   Hematological:  Negative for adenopathy.   Psychiatric/Behavioral: Negative.     All other systems reviewed and are negative.        PHYSICAL EXAM:    Vitals:    03/14/25 0415   BP: 128/62   Pulse: 78   Resp: 16   Temp: 97.5 °F (36.4 °C)   SpO2: 95%       General Appearance:  awake, alert, oriented, in no acute distress  Skin:  Skin color, texture, turgor normal. No rashes or lesions.  Head/face:  NCAT  Eyes:  No gross abnormalities. Sclera nonicteric  Lungs/Chest:  Normal expansion. No respiratory distress. On inspiration/expiration. No chest wall tenderness  Heart: Warm throughout. Regular rate 89  Abdomen:  Soft, no tenderness, non distended.  No palpable

## 2025-03-14 NOTE — CARE COORDINATION
3/14/25 Pt on IV protonix & fluids. Followed up with pt & dtr @ bedside. {T scheduled a endoscopic resection today. Discharge plan remains for pt to return home, his dtr resides with him & she reported that she is currently on 2 weeks of FMLA leave. Pt [place] & [facility] Betty Rod[facility]guez is [facility].  [facility]s [facility] other than vitamin supplements. Pt on xarelto pta & dtr reported all of pts meds are covered 100%. Pt has nebulizer & cpap [facility] the [facility]. Dtr pts primary support. Pt & Dtr confirmed no anticipated d/c needs determined. Sw following. Electronically signed by MARLEE Hugo on 3/14/2025 at 12:05 PM

## 2025-03-15 VITALS
DIASTOLIC BLOOD PRESSURE: 72 MMHG | BODY MASS INDEX: 39.4 KG/M2 | OXYGEN SATURATION: 98 % | HEIGHT: 68 IN | HEART RATE: 73 BPM | SYSTOLIC BLOOD PRESSURE: 151 MMHG | TEMPERATURE: 97.9 F | WEIGHT: 260 LBS | RESPIRATION RATE: 20 BRPM

## 2025-03-15 LAB
ALBUMIN SERPL-MCNC: 3.7 G/DL (ref 3.5–5.2)
ALP SERPL-CCNC: 71 U/L (ref 40–129)
ALT SERPL-CCNC: 19 U/L (ref 0–40)
ANION GAP SERPL CALCULATED.3IONS-SCNC: 10 MMOL/L (ref 7–16)
AST SERPL-CCNC: 23 U/L (ref 0–39)
BASOPHILS # BLD: 0.02 K/UL (ref 0–0.2)
BASOPHILS NFR BLD: 0 % (ref 0–2)
BILIRUB SERPL-MCNC: 0.9 MG/DL (ref 0–1.2)
BUN SERPL-MCNC: 15 MG/DL (ref 6–23)
CALCIUM SERPL-MCNC: 8.6 MG/DL (ref 8.6–10.2)
CHLORIDE SERPL-SCNC: 102 MMOL/L (ref 98–107)
CO2 SERPL-SCNC: 23 MMOL/L (ref 22–29)
CREAT SERPL-MCNC: 1.7 MG/DL (ref 0.7–1.2)
EOSINOPHIL # BLD: 0.02 K/UL (ref 0.05–0.5)
EOSINOPHILS RELATIVE PERCENT: 0 % (ref 0–6)
ERYTHROCYTE [DISTWIDTH] IN BLOOD BY AUTOMATED COUNT: 15.5 % (ref 11.5–15)
GFR, ESTIMATED: 41 ML/MIN/1.73M2
GLUCOSE BLD-MCNC: 233 MG/DL (ref 74–99)
GLUCOSE BLD-MCNC: 260 MG/DL (ref 74–99)
GLUCOSE BLD-MCNC: 281 MG/DL (ref 74–99)
GLUCOSE SERPL-MCNC: 238 MG/DL (ref 74–99)
HCT VFR BLD AUTO: 27.8 % (ref 37–54)
HCT VFR BLD AUTO: 28.4 % (ref 37–54)
HGB BLD-MCNC: 8.9 G/DL (ref 12.5–16.5)
HGB BLD-MCNC: 9.3 G/DL (ref 12.5–16.5)
IMM GRANULOCYTES # BLD AUTO: 0.05 K/UL (ref 0–0.58)
IMM GRANULOCYTES NFR BLD: 1 % (ref 0–5)
LYMPHOCYTES NFR BLD: 1.81 K/UL (ref 1.5–4)
LYMPHOCYTES RELATIVE PERCENT: 21 % (ref 20–42)
MCH RBC QN AUTO: 32.7 PG (ref 26–35)
MCHC RBC AUTO-ENTMCNC: 32 G/DL (ref 32–34.5)
MCV RBC AUTO: 102.2 FL (ref 80–99.9)
MONOCYTES NFR BLD: 0.49 K/UL (ref 0.1–0.95)
MONOCYTES NFR BLD: 6 % (ref 2–12)
NEUTROPHILS NFR BLD: 72 % (ref 43–80)
NEUTS SEG NFR BLD: 6.15 K/UL (ref 1.8–7.3)
PLATELET # BLD AUTO: 123 K/UL (ref 130–450)
PMV BLD AUTO: 9.8 FL (ref 7–12)
POTASSIUM SERPL-SCNC: 4.6 MMOL/L (ref 3.5–5)
PROT SERPL-MCNC: 5.9 G/DL (ref 6.4–8.3)
RBC # BLD AUTO: 2.72 M/UL (ref 3.8–5.8)
SODIUM SERPL-SCNC: 135 MMOL/L (ref 132–146)
WBC OTHER # BLD: 8.5 K/UL (ref 4.5–11.5)

## 2025-03-15 PROCEDURE — 36415 COLL VENOUS BLD VENIPUNCTURE: CPT

## 2025-03-15 PROCEDURE — 85014 HEMATOCRIT: CPT

## 2025-03-15 PROCEDURE — 85018 HEMOGLOBIN: CPT

## 2025-03-15 PROCEDURE — 80053 COMPREHEN METABOLIC PANEL: CPT

## 2025-03-15 PROCEDURE — 85025 COMPLETE CBC W/AUTO DIFF WBC: CPT

## 2025-03-15 PROCEDURE — 6360000002 HC RX W HCPCS: Performed by: HOSPITALIST

## 2025-03-15 PROCEDURE — 2580000003 HC RX 258: Performed by: HOSPITALIST

## 2025-03-15 PROCEDURE — 82962 GLUCOSE BLOOD TEST: CPT

## 2025-03-15 PROCEDURE — 6370000000 HC RX 637 (ALT 250 FOR IP)

## 2025-03-15 RX ORDER — PANTOPRAZOLE SODIUM 40 MG/1
40 TABLET, DELAYED RELEASE ORAL 2 TIMES DAILY
Qty: 60 TABLET | Refills: 3 | Status: SHIPPED | OUTPATIENT
Start: 2025-03-15

## 2025-03-15 RX ORDER — ASPIRIN 81 MG/1
81 TABLET ORAL DAILY
COMMUNITY
Start: 2025-03-15

## 2025-03-15 RX ADMIN — SODIUM CHLORIDE, PRESERVATIVE FREE 40 MG: 5 INJECTION INTRAVENOUS at 02:20

## 2025-03-15 RX ADMIN — SODIUM CHLORIDE, PRESERVATIVE FREE 40 MG: 5 INJECTION INTRAVENOUS at 18:29

## 2025-03-15 RX ADMIN — CYANOCOBALAMIN TAB 1000 MCG 500 MCG: 1000 TAB at 08:10

## 2025-03-15 RX ADMIN — INSULIN LISPRO 8 UNITS: 100 INJECTION, SOLUTION INTRAVENOUS; SUBCUTANEOUS at 18:27

## 2025-03-15 RX ADMIN — LEVOTHYROXINE SODIUM 50 MCG: 0.05 TABLET ORAL at 05:37

## 2025-03-15 RX ADMIN — SODIUM CHLORIDE, PRESERVATIVE FREE 40 MG: 5 INJECTION INTRAVENOUS at 12:20

## 2025-03-15 RX ADMIN — FERROUS SULFATE TAB 325 MG (65 MG ELEMENTAL FE) 325 MG: 325 (65 FE) TAB at 08:10

## 2025-03-15 RX ADMIN — INSULIN LISPRO 8 UNITS: 100 INJECTION, SOLUTION INTRAVENOUS; SUBCUTANEOUS at 12:18

## 2025-03-15 RX ADMIN — INSULIN LISPRO 4 UNITS: 100 INJECTION, SOLUTION INTRAVENOUS; SUBCUTANEOUS at 08:43

## 2025-03-15 RX ADMIN — INSULIN GLARGINE 28 UNITS: 100 INJECTION, SOLUTION SUBCUTANEOUS at 08:44

## 2025-03-15 RX ADMIN — TORSEMIDE 20 MG: 20 TABLET ORAL at 08:10

## 2025-03-15 ASSESSMENT — PAIN SCALES - GENERAL
PAINLEVEL_OUTOF10: 0
PAINLEVEL_OUTOF10: 0

## 2025-03-15 NOTE — PLAN OF CARE
Problem: Safety - Adult  Goal: Free from fall injury  Outcome: Progressing     Problem: Chronic Conditions and Co-morbidities  Goal: Patient's chronic conditions and co-morbidity symptoms are monitored and maintained or improved  Outcome: Progressing  Flowsheets (Taken 3/14/2025 2043)  Care Plan - Patient's Chronic Conditions and Co-Morbidity Symptoms are Monitored and Maintained or Improved: Monitor and assess patient's chronic conditions and comorbid symptoms for stability, deterioration, or improvement     Problem: Discharge Planning  Goal: Discharge to home or other facility with appropriate resources  Outcome: Progressing  Flowsheets (Taken 3/14/2025 2043)  Discharge to home or other facility with appropriate resources:   Identify barriers to discharge with patient and caregiver   Arrange for needed discharge resources and transportation as appropriate   Identify discharge learning needs (meds, wound care, etc)   Refer to discharge planning if patient needs post-hospital services based on physician order or complex needs related to functional status, cognitive ability or social support system     Problem: Pain  Goal: Verbalizes/displays adequate comfort level or baseline comfort level  Outcome: Progressing     Problem: Skin/Tissue Integrity  Goal: Skin integrity remains intact  Description: 1.  Monitor for areas of redness and/or skin breakdown  2.  Assess vascular access sites hourly  3.  Every 4-6 hours minimum:  Change oxygen saturation probe site  4.  Every 4-6 hours:  If on nasal continuous positive airway pressure, respiratory therapy assess nares and determine need for appliance change or resting period  Outcome: Progressing  Flowsheets (Taken 3/14/2025 2043)  Skin Integrity Remains Intact: Monitor for areas of redness and/or skin breakdown     Problem: ABCDS Injury Assessment  Goal: Absence of physical injury  Outcome: Progressing     Problem: Nutrition Deficit:  Goal: Optimize nutritional  status  Outcome: Progressing

## 2025-03-15 NOTE — DISCHARGE SUMMARY
Department of Internal Medicine  DS    [facility]   Admitting Physician: Dr. Connor  Consultants: Dr. Harrison      CHIEF COMPLAINT:  fall    HISTORY OF PRESENT ILLNESS:    Patient presents to ER due to falling at home twice today; states he felt lightheaded/dizzy and felt ringing in his ears, he is unsure if he lost consciousness the first fall. The second fall his daughter helped him lower himself to the ground. When he initially arrived to ER he threw up blood and had bloody diarrhea (first occurrence-no history of GI bleed, has history of anemia, on xarelto for AFIB, denies ASA/ETOH/NSAID use, takes tylenol for pain) he also has some shortness of breath with exertion. He denies new allergies. Unsure of medications-[facility] with [facility]. His daughter Gerda and her son are present at cart-side, all questions answered at this time. Patient does not use nicotine, alcohol, marijuana, or illicit drugs. Lives at home with Gerda, independent with ADLs uses cane and sometimes rollator, no home health services. He is agreeable to admission for evaluation of hematemesis.    3/7/2025  Patient seen examined on monitored bed in ED hold.  Patient currently down nuclear medicine for GI bleeding scan.  Patient still has some abdominal discomfort and the lightheadedness is improved.  BUN/creatinine 33/2.1 with normal electrolytes.  Blood sugars range 240-246.  Liver enzymes are normal with a WBC of 10.9 hemoglobin 10.5.  INR is 1.4.  Heart rate is 89 with blood pressure 147/79 with O2 sat 96% on room air at rest.  Patient's daughter is at the bedside.  Patient overall feels better but still very weak.  Unfortunately patient had a CTA of the abdomen with IV contrast and a CTA of the chest.  These did not show any evidence of GI bleed or PE or acute pulmonary problem.  There is no evidence any intraluminal hematoma or aneurysm.  Patient serum creatinine was 2.3 on admission.    3/8/2025  Patient seen examined on PCU/Endo.   elevated troponin  Leukocytosis-resolved  Acute anemia-secondary to GI blood loss-large gastric polyp with ulceration on Xarelto  Accelerated junctional rhythm   Atrial fibrillation on xarelto  COPD on albuterol sulfate, olodatrol/tiotropium   HFpEF on torsemide  History of pernicious anemia on cyanocobalamin   History of iron deficiency anemia on ferrous sulfate  Depression on duloxetine   Type II insulin dependent diabetes mellitus on glargine  Hypothyroidism on levothyroxine  Class II obesity BMI 35.26 kg/m2  Acute thrombocytopenia    PLAN:  Discharge home    Prescription for Protonix 40 mg twice daily    Resume other home meds    Follow-up PCP in 1 week    Follow-up with GI in 2-3 weeks    Follow-up general surgery as directed    Jed Connor DO  11:15 AM  3/15/2025

## 2025-03-15 NOTE — PLAN OF CARE
Problem: Safety - Adult  Goal: Free from fall injury  Outcome: Progressing     Problem: Chronic Conditions and Co-morbidities  Goal: Patient's chronic conditions and co-morbidity symptoms are monitored and maintained or improved  Outcome: Progressing     Problem: Discharge Planning  Goal: Discharge to home or other facility with appropriate resources  Outcome: Progressing     Problem: Pain  Goal: Verbalizes/displays adequate comfort level or baseline comfort level  Outcome: Progressing  Flowsheets  Taken 3/15/2025 1213  Verbalizes/displays adequate comfort level or baseline comfort level: Encourage patient to monitor pain and request assistance  Taken 3/15/2025 0759  Verbalizes/displays adequate comfort level or baseline comfort level: Encourage patient to monitor pain and request assistance     Problem: Skin/Tissue Integrity  Goal: Skin integrity remains intact  Description: 1.  Monitor for areas of redness and/or skin breakdown  2.  Assess vascular access sites hourly  3.  Every 4-6 hours minimum:  Change oxygen saturation probe site  4.  Every 4-6 hours:  If on nasal continuous positive airway pressure, respiratory therapy assess nares and determine need for appliance change or resting period  Outcome: Progressing     Problem: ABCDS Injury Assessment  Goal: Absence of physical injury  Outcome: Progressing     Problem: Nutrition Deficit:  Goal: Optimize nutritional status  Outcome: Progressing

## 2025-03-15 NOTE — OP NOTE
Operative Note      Patient: Brooke Vaughan  YOB: 1945  MRN: [id number]    Date of Procedure: 3/14/2025    Pre-Op Diagnosis Codes:      * Gastric mass [K31.89]    Post-Op Diagnosis: Same       Procedure(s):  ESOPHAGOGASTRODUODENOSCOPY GASTRIC MASS REMOVAL    Surgeon(s):  Dominick Leal MD    Assistant:   Surgical Assistant: Karina Hoskins RN  First Assistant: Elena Baker  Resident: Mikal Prado MD    Anesthesia: General    Estimated Blood Loss (mL): less than 50     Complications: None    Specimens:   ID Type Source Tests Collected by Time Destination   A : Gastric Body Polyp Tissue Tissue SURGICAL PATHOLOGY Dominick Leal MD 3/14/2025 1337    B : Gastric Antrum Polyp Tissue Tissue SURGICAL PATHOLOGY Dominick Leal MD 3/14/2025 1341        Implants:  Implant Name Type Inv. Item Serial No.  Lot No. LRB No. Used Action   CLIP INT [id number] OPN 11MM WRK CHN 2.8MM ROSA MECHANISM MR - [id number]  CLIP INT L235CM OPN 11MM WRK CHN 2.8MM ROSA MECHANISM MR  [employer] [id number] N/A 1 Implanted   CLIP HEMSTAS DEL SYS 17 [id number] CM ERGO HNDL RESOL 360 ULTRA - [id number]  CLIP HEMSTAS DEL SYS 17 [id number] CM ERGO HNDL RESOL 360 ULTRA  [employer] [id number] N/A 1 Implanted         Drains: * No LDAs found *    Findings:  Infection Present At Time Of Surgery (PATOS) (choose all levels that have infection present):  No infection present  Other Findings: see below    Detailed Description of Procedure:     The patient was identified and the procedure was confirmed.  He was taken to the operating room and laid supine on the operating room table.  He underwent general anesthesia by the anesthesia team and was intubated.  A bite-block was placed after intubation.    A lubricated upper endoscope was passed through the mouth esophagus, down to the stomach.  Immediately upon entering the stomach, a 1.5 cm pedunculated friable lesion was noted in the gastric fundus.  This was attached

## 2025-03-15 NOTE — PROGRESS NOTES
Per pt \" he was told yesterday he could possible leave today\" RN contacted Dr. Spencer who is covering for Dr. Leal regarding pt discharge. Per Dr. Spencer discharge is up to medicine.

## 2025-03-15 NOTE — PROGRESS NOTES
RN contacted Dr. Bonilla regarding Xarelto that needed to be addressed for med reconciliation upon discharge.    Per Dr. Bonilla RN to un hold xarelto  & have patient follow up with GI in 2-3 days to see about instructions for medication.    RN also to order repeat H & H upon discharge.

## 2025-03-15 NOTE — DISCHARGE INSTRUCTIONS
Learning About Gastric Polyps  What are gastric polyps?     Gastric polyps are growths in the stomach. Most people who get them don't have any problems.  The cause of most gastric polyps is not known. But some polyps grow in people who use stomach acid reducers called proton pump inhibitors (PPIs). People who have gastritis, ulcers, or an H. pylori bacterial infection in the stomach can sometimes get polyps. People who have a parent or sibling with gastric polyps might have them too.  Most gastric polyps aren't cancer. But a certain kind of polyp can turn into cancer.  What are the symptoms?  You may not know that you have gastric polyps. Most polyps don't lead to symptoms. Once in a while, larger polyps may cause bleeding, belly pain, or a blockage in the stomach.  How are polyps diagnosed and treated?  Most gastric polyps are found during an endoscopy (say \"ux-SON-xwbg-pee\") that is done for another health problem. Endoscopy is a test that uses a thin flexible tube to allow a doctor to look at the inside of your stomach.  Your doctor will treat your polyps based on what is seen during this test. If your doctor finds a polyp during the test, they may take it out. It will then be tested to make sure it isn't cancer.  If your doctor finds H. pylori bacteria in your stomach, the doctor will prescribe antibiotics. If you have been taking a PPI, the doctor may prescribe a different medicine instead.  Sometimes the doctor may suggest another endoscopy to see how treatment is working. They may also suggest this to recheck certain kinds of polyps. The doctor may also suggest a colonoscopy to look for polyps in your colon.  Follow-up care is a key part of your treatment and safety. Be sure to make and go to all appointments, and call your doctor if you are having problems. It's also a good idea to know your test results and keep a list of the medicines you take.  Current as of: October 19, 2024  Content Version: 14.4  ©  [id number] [employer].   Care instructions adapted under license by [facility]. If you have questions about a medical condition or this instruction, always ask your healthcare professional. [employer] disclaims any warranty or liability for your use of this information.

## 2025-03-15 NOTE — PROGRESS NOTES
PROGRESS NOTE  By Billy Mathews, TIFFANIP    [facility]  Dr. Kristen Lowery M.D.,  Dr. Jose Dyson M.D.,   Dr. Nelson Armenta D.O.,  Dr. Jean-Pierre Harrison M.D.,  MCKAYLA LawrenceO.,          Brooke Vaughan  79 y.o.  male    SUBJECTIVE:  Patient resting in bed.  Denies abdominal pain.  Denies nausea or vomiting.  Family at bedside.    OBJECTIVE:    BP (!) 144/65   Pulse 77   Temp 98.2 °F (36.8 °C) (Oral)   Resp 16   Ht 1.73 m (5' 8.11\")   Wt 117.9 kg (260 lb)   SpO2 98%   BMI 39.41 kg/m²     General: NAD/adult  male.  AAOx3.  Obese  HEENT: Anicteric sclera/moist oral mucosa  Neck: Supple with trachea midline  Chest: Symmetric excursion/CTAB  Cor: Irregular  Abd.: Soft.  Obese.  Nontender.  Extr.:  BLE mild edema.  Decreased muscle tone and bulk throughout  Skin: Warm and dry/anicteric      DATA:    Monitor data reviewed -atrial fibrillation noted.    Stool (measured) : 1 mL  Lab Results   Component Value Date/Time    WBC 4.7 03/14/2025 05:59 AM    RBC 2.55 03/14/2025 05:59 AM    HGB 8.5 03/14/2025 05:59 AM    HCT 25.4 03/14/2025 05:59 AM    MCV 99.6 03/14/2025 05:59 AM    MCH 33.3 03/14/2025 05:59 AM    MCHC 33.5 03/14/2025 05:59 AM    RDW 16.3 03/14/2025 05:59 AM    PLT 94 03/14/2025 05:59 AM    MPV 9.3 03/14/2025 05:59 AM     Lab Results   Component Value Date/Time     03/14/2025 05:59 AM    K 4.1 03/14/2025 05:59 AM    K 4.5 05/07/2019 11:05 AM     03/14/2025 05:59 AM    CO2 26 03/14/2025 05:59 AM    BUN 11 03/14/2025 05:59 AM    CREATININE 1.7 03/14/2025 05:59 AM    CALCIUM 8.5 03/14/2025 05:59 AM    BILITOT 0.7 03/14/2025 05:59 AM    ALKPHOS 66 03/14/2025 05:59 AM    AST 24 03/14/2025 05:59 AM    ALT 19 03/14/2025 05:59 AM     Lab Results   Component Value Date/Time    LIPASE 6 03/06/2025 07:56 PM     No results found for: \"AMYLASE\"      ASSESSMENT/PLAN:  Patient Active Problem List   Diagnosis    GERD (gastroesophageal reflux disease)    Diabetes mellitus type 2, noninsulin  labs.  No other immediate interventions planned at this time from GI POV.  Likely outpatient colonoscopy.  Patient should follow in our office in the next 2 to 3 weeks.  Patient to call for appointment.  [phone].  Thank you for the opportunity to participate in the care of Mr. Vaughan.  .  Renan Sortos, APRN - CNP  3/15/2025  8:55 AM    NOTE:  This report was transcribed using voice recognition software.  Every effort was made to ensure accuracy; however, inadvertent computerized transcription errors may be present.

## 2025-03-21 ENCOUNTER — APPOINTMENT (OUTPATIENT)
Dept: GENERAL RADIOLOGY | Age: 80
End: 2025-03-21
Payer: OTHER GOVERNMENT

## 2025-03-21 ENCOUNTER — HOSPITAL ENCOUNTER (EMERGENCY)
Age: 80
Discharge: HOME | End: 2025-03-21
Attending: EMERGENCY MEDICINE
Payer: OTHER GOVERNMENT

## 2025-03-21 VITALS
SYSTOLIC BLOOD PRESSURE: 109 MMHG | TEMPERATURE: 97.9 F | RESPIRATION RATE: 12 BRPM | DIASTOLIC BLOOD PRESSURE: 57 MMHG | OXYGEN SATURATION: 100 % | HEART RATE: 57 BPM

## 2025-03-21 DIAGNOSIS — D64.9 CHRONIC ANEMIA: ICD-10-CM

## 2025-03-21 DIAGNOSIS — N28.9 RENAL INSUFFICIENCY: ICD-10-CM

## 2025-03-21 DIAGNOSIS — I95.9 HYPOTENSION, UNSPECIFIED HYPOTENSION TYPE: Primary | ICD-10-CM

## 2025-03-21 DIAGNOSIS — E86.0 DEHYDRATION: ICD-10-CM

## 2025-03-21 LAB
ABO + RH BLD: NORMAL
ALBUMIN SERPL-MCNC: 4.1 G/DL (ref 3.5–5.2)
ALP SERPL-CCNC: 86 U/L (ref 40–129)
ALT SERPL-CCNC: 14 U/L (ref 0–40)
ANION GAP SERPL CALCULATED.3IONS-SCNC: 12 MMOL/L (ref 7–16)
ARM BAND NUMBER: NORMAL
AST SERPL-CCNC: 17 U/L (ref 0–39)
BASOPHILS # BLD: 0.03 K/UL (ref 0–0.2)
BASOPHILS NFR BLD: 0 % (ref 0–2)
BILIRUB DIRECT SERPL-MCNC: <0.2 MG/DL (ref 0–0.3)
BILIRUB INDIRECT SERPL-MCNC: NORMAL MG/DL (ref 0–1)
BILIRUB SERPL-MCNC: 0.5 MG/DL (ref 0–1.2)
BLOOD BANK SAMPLE EXPIRATION: NORMAL
BLOOD GROUP ANTIBODIES SERPL: NEGATIVE
BNP SERPL-MCNC: 202 PG/ML (ref 0–450)
BUN SERPL-MCNC: 23 MG/DL (ref 6–23)
CALCIUM SERPL-MCNC: 9.4 MG/DL (ref 8.6–10.2)
CHLORIDE SERPL-SCNC: 100 MMOL/L (ref 98–107)
CO2 SERPL-SCNC: 26 MMOL/L (ref 22–29)
CREAT SERPL-MCNC: 2.3 MG/DL (ref 0.7–1.2)
EKG ATRIAL RATE: 66 BPM
EKG P AXIS: 47 DEGREES
EKG P-R INTERVAL: 244 MS
EKG Q-T INTERVAL: 418 MS
EKG QRS DURATION: 92 MS
EKG QTC CALCULATION (BAZETT): 438 MS
EKG R AXIS: -18 DEGREES
EKG T AXIS: 46 DEGREES
EKG VENTRICULAR RATE: 66 BPM
EOSINOPHIL # BLD: 0.12 K/UL (ref 0.05–0.5)
EOSINOPHILS RELATIVE PERCENT: 2 % (ref 0–6)
ERYTHROCYTE [DISTWIDTH] IN BLOOD BY AUTOMATED COUNT: 15.8 % (ref 11.5–15)
GFR, ESTIMATED: 28 ML/MIN/1.73M2
GLUCOSE SERPL-MCNC: 150 MG/DL (ref 74–99)
HCT VFR BLD AUTO: 32.4 % (ref 37–54)
HGB BLD-MCNC: 10.5 G/DL (ref 12.5–16.5)
IMM GRANULOCYTES # BLD AUTO: 0.06 K/UL (ref 0–0.58)
IMM GRANULOCYTES NFR BLD: 1 % (ref 0–5)
LACTATE BLDV-SCNC: 1.3 MMOL/L (ref 0.5–1.9)
LACTATE BLDV-SCNC: 3.6 MMOL/L (ref 0.5–1.9)
LIPASE SERPL-CCNC: 9 U/L (ref 13–60)
LYMPHOCYTES NFR BLD: 2.18 K/UL (ref 1.5–4)
LYMPHOCYTES RELATIVE PERCENT: 30 % (ref 20–42)
MAGNESIUM SERPL-MCNC: 2.3 MG/DL (ref 1.6–2.6)
MCH RBC QN AUTO: 33.1 PG (ref 26–35)
MCHC RBC AUTO-ENTMCNC: 32.4 G/DL (ref 32–34.5)
MCV RBC AUTO: 102.2 FL (ref 80–99.9)
MONOCYTES NFR BLD: 0.55 K/UL (ref 0.1–0.95)
MONOCYTES NFR BLD: 8 % (ref 2–12)
NEUTROPHILS NFR BLD: 60 % (ref 43–80)
NEUTS SEG NFR BLD: 4.34 K/UL (ref 1.8–7.3)
PLATELET # BLD AUTO: 125 K/UL (ref 130–450)
PMV BLD AUTO: 9.5 FL (ref 7–12)
POTASSIUM SERPL-SCNC: 4.2 MMOL/L (ref 3.5–5)
PROT SERPL-MCNC: 6.8 G/DL (ref 6.4–8.3)
RBC # BLD AUTO: 3.17 M/UL (ref 3.8–5.8)
SODIUM SERPL-SCNC: 138 MMOL/L (ref 132–146)
TROPONIN I SERPL HS-MCNC: 29 NG/L (ref 0–11)
WBC OTHER # BLD: 7.3 K/UL (ref 4.5–11.5)

## 2025-03-21 PROCEDURE — 83690 ASSAY OF LIPASE: CPT

## 2025-03-21 PROCEDURE — 83880 ASSAY OF NATRIURETIC PEPTIDE: CPT

## 2025-03-21 PROCEDURE — 86850 RBC ANTIBODY SCREEN: CPT

## 2025-03-21 PROCEDURE — 2580000003 HC RX 258: Performed by: EMERGENCY MEDICINE

## 2025-03-21 PROCEDURE — 96361 HYDRATE IV INFUSION ADD-ON: CPT

## 2025-03-21 PROCEDURE — 80053 COMPREHEN METABOLIC PANEL: CPT

## 2025-03-21 PROCEDURE — 86900 BLOOD TYPING SEROLOGIC ABO: CPT

## 2025-03-21 PROCEDURE — 96360 HYDRATION IV INFUSION INIT: CPT

## 2025-03-21 PROCEDURE — 86901 BLOOD TYPING SEROLOGIC RH(D): CPT

## 2025-03-21 PROCEDURE — 83605 ASSAY OF LACTIC ACID: CPT

## 2025-03-21 PROCEDURE — 99285 EMERGENCY DEPT VISIT HI MDM: CPT

## 2025-03-21 PROCEDURE — 82248 BILIRUBIN DIRECT: CPT

## 2025-03-21 PROCEDURE — 71046 X-RAY EXAM CHEST 2 VIEWS: CPT

## 2025-03-21 PROCEDURE — 93005 ELECTROCARDIOGRAM TRACING: CPT | Performed by: EMERGENCY MEDICINE

## 2025-03-21 PROCEDURE — 85025 COMPLETE CBC W/AUTO DIFF WBC: CPT

## 2025-03-21 PROCEDURE — 84484 ASSAY OF TROPONIN QUANT: CPT

## 2025-03-21 PROCEDURE — 83735 ASSAY OF MAGNESIUM: CPT

## 2025-03-21 PROCEDURE — 87040 BLOOD CULTURE FOR BACTERIA: CPT

## 2025-03-21 RX ORDER — 0.9 % SODIUM CHLORIDE 0.9 %
1000 INTRAVENOUS SOLUTION INTRAVENOUS ONCE
Status: COMPLETED | OUTPATIENT
Start: 2025-03-21 | End: 2025-03-21

## 2025-03-21 RX ADMIN — SODIUM CHLORIDE 1000 ML: 0.9 INJECTION, SOLUTION INTRAVENOUS at 15:04

## 2025-03-21 RX ADMIN — SODIUM CHLORIDE 1000 ML: 0.9 INJECTION, SOLUTION INTRAVENOUS at 10:56

## 2025-03-21 ASSESSMENT — ENCOUNTER SYMPTOMS
ABDOMINAL PAIN: 0
RHINORRHEA: 0
SORE THROAT: 0
VOMITING: 0
DIARRHEA: 0
NAUSEA: 0
SINUS PRESSURE: 0
CHEST TIGHTNESS: 0
WHEEZING: 0
COUGH: 0
BACK PAIN: 0
SHORTNESS OF BREATH: 0

## 2025-03-21 NOTE — DISCHARGE INSTR - COC
Continuity of Care Form    Patient Name: Brooke Vaughan   :  1945  MRN:  [id number]    Admit date:  3/21/2025  Discharge date:  ***    Code Status Order: Prior   Advance Directives:     Admitting Physician:  No admitting provider for patient encounter.  PCP: Betty Rodríguez (Inactive)    Discharging Nurse: ***  Discharging Hospital Unit/Room#:   Discharging Unit Phone Number: ***    Emergency Contact:   Extended Emergency Contact Information  Primary Emergency Contact: Gerda Saucedo  Address: [address] [place] [address]  Home Phone: [phone]  Work Phone: [phone]  Mobile Phone: [phone]  Relation: Child   needed? No    Past Surgical History:  Past Surgical History:   Procedure Laterality Date    CARDIAC CATHETERIZATION  2012    CARDIAC CATHETERIZATION  10/12/2018    Dr. Soler    CARDIOVASCULAR STRESS TEST      CHOLECYSTECTOMY      DIAGNOSTIC CARDIAC CATH LAB PROCEDURE  2007    Dr. Gilbert No CAD EF 59%    DIAGNOSTIC CARDIAC CATH LAB PROCEDURE  2010    Dr. Castillo: EF 66% Moderate ectasia involving RCA and cx    UPPER GASTROINTESTINAL ENDOSCOPY N/A 3/8/2025    ESOPHAGOGASTRODUODENOSCOPY BIOPSY performed by JEANNIE Harrison MD at [facility] ENDOSCOPY    UPPER GASTROINTESTINAL ENDOSCOPY N/A 3/14/2025    ESOPHAGOGASTRODUODENOSCOPY GASTRIC MASS REMOVAL performed by Dominick Leal MD at [facility] OR       Immunization History:   Immunization History   Administered Date(s) Administered    COVID-19, PFIZER, , (age 12y+), IM, 30mcg/0.3mL 2024, 10/21/2024    Influenza, FLUARIX, FLULAVAL, FLUZONE (age 6 mo+) and AFLURIA, (age 3 y+), Quadv PF, 0.5mL 2017    Influenza, Quadv, 6 mo and older, IM, PF (Flulaval, Fluarix) 10/05/2018    Pneumococcal, PCV-13, PREVNAR 13, (age 6w+), IM, 0.5mL 2017    TD 2LF, TDVAX, (age 7y+), IM, 0.5mL 2023       Active Problems:  Patient Active Problem List   Diagnosis Code    GERD

## 2025-03-21 NOTE — ED NOTES
Department of Emergency Medicine  FIRST PROVIDER TRIAGE NOTE             Independent MLP           3/21/25  9:11 AM EDT    Date of Encounter: 3/21/25   MRN: [id number]      HPI: Brooke Vaughan is a 79 y.o. male who presents to the ED for hypotension (Low blood pressure, pt's daughter states BP was 80/59 pt states he just got out of the hospital week ago for polyp removals)       ROS: Negative for cp or sob.    PE: Gen Appearance/Constitutional: alert  Musculoskeletal: moves all extremities x 4    Initial Plan of Care: All treatment areas with department are currently occupied.      Plan to order/Initiate the following while awaiting opening in ED: Triage evaluation  .     Provider-Patient relationship only established for Provider In Triage (PIT).  Full assessment, HPI and examination not performed, therefore, it is not yet possible to state whether or not an emergency medical condition exists     Initial Plan of Care: Initiate Treatment-Testing, Proceed toTreatment Area When Bed Available for ED Attending/MLP to Continue Care  Secondary to high volume, low staffing, and/or boarding- patient to await bed availability.     This ends my PIT-Patient relationship.  Care of patient relinquished after triage         Electronically signed by RICK Cabral CNP   DD: 3/21/25

## 2025-03-21 NOTE — DISCHARGE INSTRUCTIONS
Return if symptoms change or worsen.       Thank you for the opportunity to care for you during this emergency department visit. I hope you are doing better. We strive to always improve our care. You may receive a survey and I hope you had a positive experience here. We greatly appreciate your 5 scores.     70.3

## 2025-03-21 NOTE — ED PROVIDER NOTES
Chief complaint:  Low blood pressure      HPI history provided by the patient  The patient presents here with a history of what sounds like some GI bleeding had some polyps removed and was scoped recently.  States that his blood pressure been running low the last couple days, he believes he is still taking his blood pressure medicines though.  Denies lightheadedness or syncope and denies abdominal pain.  No black or tarry stools or bloody stools at this time.  No nausea or vomiting.  No chest pain or palpitations or shortness of breath.  No leg pain or swelling.  No confusion.  No fevers.    Review of Systems   Constitutional:  Negative for chills, diaphoresis, fatigue and fever.   HENT:  Negative for congestion, rhinorrhea, sinus pressure and sore throat.    Respiratory:  Negative for cough, chest tightness, shortness of breath and wheezing.    Cardiovascular:  Negative for chest pain, palpitations and leg swelling.   Gastrointestinal:  Negative for abdominal pain, diarrhea, nausea and vomiting.   Genitourinary:  Negative for dysuria, flank pain, frequency and urgency.   Musculoskeletal:  Negative for arthralgias, back pain, gait problem, joint swelling, myalgias, neck pain and neck stiffness.   Skin:  Negative for rash and wound.   Neurological:  Negative for dizziness, seizures, syncope, weakness, light-headedness, numbness and headaches.   All other systems reviewed and are negative.       Physical Exam  Vitals and nursing note reviewed.   Constitutional:       General: He is awake. He is not in acute distress.     Appearance: He is well-developed. He is not ill-appearing, toxic-appearing or diaphoretic.   HENT:      Head: Normocephalic and atraumatic.   Eyes:      General: No scleral icterus.     Pupils: Pupils are equal, round, and reactive to light.   Cardiovascular:      Rate and Rhythm: Normal rate and regular rhythm.      Heart sounds: Normal heart sounds. No murmur heard.  Pulmonary:      Effort:         Disposition:  Patient's disposition: Discharge to home  Patient's condition is stable.         Octavio Hernandez,   03/21/25 150

## 2025-03-23 LAB
MICROORGANISM SPEC CULT: NORMAL
MICROORGANISM SPEC CULT: NORMAL
SERVICE CMNT-IMP: NORMAL
SERVICE CMNT-IMP: NORMAL
SPECIMEN DESCRIPTION: NORMAL
SPECIMEN DESCRIPTION: NORMAL

## 2025-04-02 LAB — SURGICAL PATHOLOGY REPORT: NORMAL

## 2025-07-25 ENCOUNTER — HOSPITAL ENCOUNTER (EMERGENCY)
Age: 80
Discharge: HOME | End: 2025-07-25
Attending: EMERGENCY MEDICINE
Payer: OTHER GOVERNMENT

## 2025-07-25 ENCOUNTER — APPOINTMENT (OUTPATIENT)
Dept: CT IMAGING | Age: 80
End: 2025-07-25
Payer: OTHER GOVERNMENT

## 2025-07-25 ENCOUNTER — APPOINTMENT (OUTPATIENT)
Dept: GENERAL RADIOLOGY | Age: 80
End: 2025-07-25
Payer: OTHER GOVERNMENT

## 2025-07-25 VITALS
SYSTOLIC BLOOD PRESSURE: 131 MMHG | OXYGEN SATURATION: 94 % | DIASTOLIC BLOOD PRESSURE: 78 MMHG | TEMPERATURE: 98.5 F | HEART RATE: 56 BPM | RESPIRATION RATE: 17 BRPM

## 2025-07-25 DIAGNOSIS — K59.00 CONSTIPATION, UNSPECIFIED CONSTIPATION TYPE: ICD-10-CM

## 2025-07-25 DIAGNOSIS — R10.31 RIGHT LOWER QUADRANT ABDOMINAL PAIN: Primary | ICD-10-CM

## 2025-07-25 LAB
ALBUMIN SERPL-MCNC: 4.3 G/DL (ref 3.5–5.2)
ALP SERPL-CCNC: 77 U/L (ref 40–129)
ALT SERPL-CCNC: 26 U/L (ref 0–50)
ANION GAP SERPL CALCULATED.3IONS-SCNC: 13 MMOL/L (ref 7–16)
AST SERPL-CCNC: 34 U/L (ref 0–50)
BASOPHILS # BLD: 0.04 K/UL (ref 0–0.2)
BASOPHILS NFR BLD: 1 % (ref 0–2)
BILIRUB SERPL-MCNC: 1 MG/DL (ref 0–1.2)
BUN SERPL-MCNC: 25 MG/DL (ref 8–23)
CALCIUM SERPL-MCNC: 9.3 MG/DL (ref 8.8–10.2)
CHLORIDE SERPL-SCNC: 101 MMOL/L (ref 98–107)
CO2 SERPL-SCNC: 27 MMOL/L (ref 22–29)
CREAT SERPL-MCNC: 2.1 MG/DL (ref 0.7–1.2)
EOSINOPHIL # BLD: 0.31 K/UL (ref 0.05–0.5)
EOSINOPHILS RELATIVE PERCENT: 4 % (ref 0–6)
ERYTHROCYTE [DISTWIDTH] IN BLOOD BY AUTOMATED COUNT: 13.2 % (ref 11.5–15)
GFR, ESTIMATED: 31 ML/MIN/1.73M2
GLUCOSE SERPL-MCNC: 62 MG/DL (ref 74–99)
HCT VFR BLD AUTO: 50.3 % (ref 37–54)
HGB BLD-MCNC: 17.5 G/DL (ref 12.5–16.5)
IMM GRANULOCYTES # BLD AUTO: 0.05 K/UL (ref 0–0.58)
IMM GRANULOCYTES NFR BLD: 1 % (ref 0–5)
LACTATE BLDV-SCNC: 1.3 MMOL/L (ref 0.5–2.2)
LYMPHOCYTES NFR BLD: 2.78 K/UL (ref 1.5–4)
LYMPHOCYTES RELATIVE PERCENT: 36 % (ref 20–42)
MCH RBC QN AUTO: 33.3 PG (ref 26–35)
MCHC RBC AUTO-ENTMCNC: 34.8 G/DL (ref 32–34.5)
MCV RBC AUTO: 95.6 FL (ref 80–99.9)
MONOCYTES NFR BLD: 0.55 K/UL (ref 0.1–0.95)
MONOCYTES NFR BLD: 7 % (ref 2–12)
NEUTROPHILS NFR BLD: 52 % (ref 43–80)
NEUTS SEG NFR BLD: 4.01 K/UL (ref 1.8–7.3)
PLATELET CONFIRMATION: NORMAL
PLATELET, FLUORESCENCE: 90 K/UL (ref 130–450)
PMV BLD AUTO: 9.9 FL (ref 7–12)
POTASSIUM SERPL-SCNC: 4.2 MMOL/L (ref 3.5–5.1)
PROT SERPL-MCNC: 7.2 G/DL (ref 6.4–8.3)
RBC # BLD AUTO: 5.26 M/UL (ref 3.8–5.8)
SODIUM SERPL-SCNC: 141 MMOL/L (ref 136–145)
TROPONIN I SERPL HS-MCNC: 23 NG/L (ref 0–22)
WBC OTHER # BLD: 7.7 K/UL (ref 4.5–11.5)

## 2025-07-25 PROCEDURE — 80053 COMPREHEN METABOLIC PANEL: CPT

## 2025-07-25 PROCEDURE — 99285 EMERGENCY DEPT VISIT HI MDM: CPT

## 2025-07-25 PROCEDURE — 84484 ASSAY OF TROPONIN QUANT: CPT

## 2025-07-25 PROCEDURE — 93005 ELECTROCARDIOGRAM TRACING: CPT

## 2025-07-25 PROCEDURE — 85025 COMPLETE CBC W/AUTO DIFF WBC: CPT

## 2025-07-25 PROCEDURE — 71046 X-RAY EXAM CHEST 2 VIEWS: CPT

## 2025-07-25 PROCEDURE — 6370000000 HC RX 637 (ALT 250 FOR IP)

## 2025-07-25 PROCEDURE — 74176 CT ABD & PELVIS W/O CONTRAST: CPT

## 2025-07-25 PROCEDURE — 83605 ASSAY OF LACTIC ACID: CPT

## 2025-07-25 RX ADMIN — LIDOCAINE HYDROCHLORIDE: 20 SOLUTION ORAL at 17:33

## 2025-07-25 NOTE — ED PROVIDER NOTES
measure? No Exclusion criteria - the patient is NOT to be included for SEP-1 Core Measure due to: Infection is not suspected      Discussion with Other Professionals: See ED course  CONSULTS: discussion with bolded \"IP consult\", otherwise consult was likely placed by admitting service  None    Social determinants of health affecting patient care:  stress, not elsewhere classified    Records Reviewed : None    PROCEDURE: None    CONSULTS: None    DISPOSITION:     Pt will be d/c with prescriptions for magnesium citrate and will follow up with his PCP. He is educated on signs and symptoms that require emergent evaluation. Pt is advised to return to the ED if his symptoms change or worsen. If his pain persists, pt may need further evaluation. Pt is agreeable to plan and all questions have been answered at this time.          Re-Evaluations/Consultations:             ED Course as of 07/25/25 2139 Fri Jul 25, 2025   1619   ATTENDING PROVIDER ATTESTATION:     I have personally performed and/or participated in the history, exam, medical decision making, and procedures and agree with all pertinent clinical information unless otherwise noted.    I have also reviewed and agree with the past medical, family and social history unless otherwise noted.    I have discussed this patient in detail with the resident and provided the instruction and education regarding the evidence-based evaluation and treatment of SOB.    Any EKG that may have been performed has been personally reviewed by me and I agree with the documentation as noted by the resident.    History: Patient presents to the ED for evaluation of shortness of breath with exertion.  He also reports that for the last several days he has been having dark stools.  Complains of generalized abdominal pain mostly in the lower abdomen.  No fever or chills.  No chest pain.  Denies dizziness, lightness, or syncope.    My findings: Brooke Vaughan is a 80 y.o. male whom is in no

## 2025-07-26 LAB
EKG ATRIAL RATE: 60 BPM
EKG P AXIS: 64 DEGREES
EKG P-R INTERVAL: 272 MS
EKG Q-T INTERVAL: 450 MS
EKG QRS DURATION: 90 MS
EKG QTC CALCULATION (BAZETT): 450 MS
EKG R AXIS: -19 DEGREES
EKG T AXIS: 48 DEGREES
EKG VENTRICULAR RATE: 60 BPM

## (undated) DEVICE — TUBING, SUCTION, 1/4" X 10', STRAIGHT: Brand: MEDLINE

## (undated) DEVICE — BAG SPECIMEN BIOHAZARD 6IN X 9IN

## (undated) DEVICE — SPONGE GZ 4IN 4IN 4 PLY N WVN AVANT

## (undated) DEVICE — AIR/WATER CLEANING ADAPTER FOR OLYMPUS® GI ENDOSCOPE: Brand: BULLDOG®

## (undated) DEVICE — KIT BEDSIDE REVITAL OX 500ML

## (undated) DEVICE — RETRIEVAL DEVICE: Brand: RESCUENET™

## (undated) DEVICE — 4-PORT MANIFOLD: Brand: NEPTUNE 2

## (undated) DEVICE — FORCEPS BX L240CM JAW DIA2.8MM L CAP W/ NDL MIC MESH TOOTH

## (undated) DEVICE — SNARE ENDOSCP ROUNDED 2.4X20X240 MM STIFF CAPTIVATOR II

## (undated) DEVICE — TOWEL,OR,DSP,ST,BLUE,STD,6/PK,12PK/CS: Brand: MEDLINE

## (undated) DEVICE — CONTAINER SPEC COLL 960ML POLYPR TRIANG GRAD INTAKE/OUTPUT

## (undated) DEVICE — KENDALL 450 SERIES MONITORING FOAM ELECTRODE - RECTANGULAR SHAPE ( 3/PK): Brand: KENDALL

## (undated) DEVICE — YANKAUER,BULB TIP,W/O VENT,RIGID,STERILE: Brand: MEDLINE

## (undated) DEVICE — Device: Brand: DEFENDO VALVE AND CONNECTOR KIT

## (undated) DEVICE — ELECTRODE PT RET AD L9FT HI MOIST COND ADH HYDRGEL CORDED

## (undated) DEVICE — GOWN ISOLATN REG YEL M WT MULTIPLY SIDETIE LEV 2

## (undated) DEVICE — 6 X 9  1.75MIL 4-WALL LABGUARD: Brand: MINIGRIP COMMERCIAL LLC

## (undated) DEVICE — BLOCK BITE 60FR CAREGUARD

## (undated) DEVICE — SUPPLEMENT DIGESTIVE H2O SOL GI-EASE

## (undated) DEVICE — ADAPTER CLEANING PORPOISE CLEANING

## (undated) DEVICE — JELLY,LUBE,STERILE,FLIP TOP,TUBE,4-OZ: Brand: MEDLINE